# Patient Record
Sex: MALE | Race: WHITE | Employment: OTHER | URBAN - METROPOLITAN AREA
[De-identification: names, ages, dates, MRNs, and addresses within clinical notes are randomized per-mention and may not be internally consistent; named-entity substitution may affect disease eponyms.]

---

## 2017-01-16 PROBLEM — N52.9 ERECTILE DYSFUNCTION: Status: ACTIVE | Noted: 2017-01-16

## 2017-01-16 PROBLEM — J30.9 ALLERGIC RHINITIS: Status: ACTIVE | Noted: 2017-01-16

## 2017-01-16 PROBLEM — R60.9 EDEMA: Status: ACTIVE | Noted: 2017-01-16

## 2017-01-16 PROBLEM — I25.10 CORONARY ATHEROSCLEROSIS OF NATIVE CORONARY VESSEL: Status: ACTIVE | Noted: 2017-01-16

## 2022-03-19 PROBLEM — J30.9 ALLERGIC RHINITIS: Status: ACTIVE | Noted: 2017-01-16

## 2022-03-20 PROBLEM — I25.10 CORONARY ATHEROSCLEROSIS OF NATIVE CORONARY VESSEL: Status: ACTIVE | Noted: 2017-01-16

## 2022-03-28 PROBLEM — I45.10 RBBB: Status: ACTIVE | Noted: 2022-03-28

## 2022-05-01 ENCOUNTER — HOSPITAL ENCOUNTER (INPATIENT)
Age: 67
LOS: 2 days | Discharge: HOME | DRG: 243 | End: 2022-05-03
Attending: EMERGENCY MEDICINE | Admitting: INTERNAL MEDICINE
Payer: COMMERCIAL

## 2022-05-01 ENCOUNTER — APPOINTMENT (OUTPATIENT)
Dept: CT IMAGING | Age: 67
DRG: 243 | End: 2022-05-01
Attending: EMERGENCY MEDICINE
Payer: COMMERCIAL

## 2022-05-01 ENCOUNTER — APPOINTMENT (OUTPATIENT)
Dept: GENERAL RADIOLOGY | Age: 67
DRG: 243 | End: 2022-05-01
Attending: STUDENT IN AN ORGANIZED HEALTH CARE EDUCATION/TRAINING PROGRAM
Payer: COMMERCIAL

## 2022-05-01 DIAGNOSIS — I45.9 STOKES-ADAMS SYNCOPE: ICD-10-CM

## 2022-05-01 DIAGNOSIS — R00.1 BRADYCARDIA: ICD-10-CM

## 2022-05-01 DIAGNOSIS — I45.10 RBBB: Chronic | ICD-10-CM

## 2022-05-01 DIAGNOSIS — E78.5 DYSLIPIDEMIA: Chronic | ICD-10-CM

## 2022-05-01 DIAGNOSIS — G45.3 AMAUROSIS FUGAX OF RIGHT EYE: ICD-10-CM

## 2022-05-01 DIAGNOSIS — I10 PRIMARY HYPERTENSION: Chronic | ICD-10-CM

## 2022-05-01 DIAGNOSIS — I31.39 PERICARDIAL EFFUSION: ICD-10-CM

## 2022-05-01 DIAGNOSIS — I25.10 ATHEROSCLEROSIS OF NATIVE CORONARY ARTERY OF NATIVE HEART WITHOUT ANGINA PECTORIS: Chronic | ICD-10-CM

## 2022-05-01 DIAGNOSIS — I44.2 COMPLETE HEART BLOCK (HCC): ICD-10-CM

## 2022-05-01 DIAGNOSIS — G45.9 TRANSIENT ISCHEMIC ATTACK: Primary | ICD-10-CM

## 2022-05-01 DIAGNOSIS — R55 SYNCOPE AND COLLAPSE: ICD-10-CM

## 2022-05-01 LAB
ALBUMIN SERPL-MCNC: 3.8 G/DL (ref 3.2–4.6)
ALBUMIN/GLOB SERPL: 1 {RATIO} (ref 1.2–3.5)
ALP SERPL-CCNC: 53 U/L (ref 50–136)
ALT SERPL-CCNC: 71 U/L (ref 12–65)
ANION GAP SERPL CALC-SCNC: 8 MMOL/L (ref 7–16)
APPEARANCE UR: CLEAR
AST SERPL-CCNC: 38 U/L (ref 15–37)
BACTERIA URNS QL MICRO: 0 /HPF
BASOPHILS # BLD: 0 K/UL (ref 0–0.2)
BASOPHILS NFR BLD: 0 % (ref 0–2)
BILIRUB SERPL-MCNC: 1.1 MG/DL (ref 0.2–1.1)
BILIRUB UR QL: NEGATIVE
BUN SERPL-MCNC: 13 MG/DL (ref 8–23)
CALCIUM SERPL-MCNC: 9.2 MG/DL (ref 8.3–10.4)
CHLORIDE SERPL-SCNC: 99 MMOL/L (ref 98–107)
CO2 SERPL-SCNC: 28 MMOL/L (ref 21–32)
COLOR UR: YELLOW
CREAT SERPL-MCNC: 1.2 MG/DL (ref 0.8–1.5)
DIFFERENTIAL METHOD BLD: ABNORMAL
EOSINOPHIL # BLD: 0.2 K/UL (ref 0–0.8)
EOSINOPHIL NFR BLD: 1 % (ref 0.5–7.8)
ERYTHROCYTE [DISTWIDTH] IN BLOOD BY AUTOMATED COUNT: 12.6 % (ref 11.9–14.6)
GLOBULIN SER CALC-MCNC: 4 G/DL (ref 2.3–3.5)
GLUCOSE SERPL-MCNC: 116 MG/DL (ref 65–100)
GLUCOSE UR STRIP.AUTO-MCNC: NEGATIVE MG/DL
HCT VFR BLD AUTO: 38.9 % (ref 41.1–50.3)
HGB BLD-MCNC: 14.7 G/DL (ref 13.6–17.2)
HGB UR QL STRIP: NEGATIVE
IMM GRANULOCYTES # BLD AUTO: 0 K/UL (ref 0–0.5)
IMM GRANULOCYTES NFR BLD AUTO: 0 % (ref 0–5)
KETONES UR QL STRIP.AUTO: NEGATIVE MG/DL
LEUKOCYTE ESTERASE UR QL STRIP.AUTO: NEGATIVE
LYMPHOCYTES # BLD: 1.3 K/UL (ref 0.5–4.6)
LYMPHOCYTES NFR BLD: 11 % (ref 13–44)
MCH RBC QN AUTO: 34.7 PG (ref 26.1–32.9)
MCHC RBC AUTO-ENTMCNC: 37.8 G/DL (ref 31.4–35)
MCV RBC AUTO: 91.7 FL (ref 79.6–97.8)
MONOCYTES # BLD: 0.5 K/UL (ref 0.1–1.3)
MONOCYTES NFR BLD: 4 % (ref 4–12)
NEUTS SEG # BLD: 9.8 K/UL (ref 1.7–8.2)
NEUTS SEG NFR BLD: 83 % (ref 43–78)
NITRITE UR QL STRIP.AUTO: NEGATIVE
NRBC # BLD: 0 K/UL (ref 0–0.2)
PH UR STRIP: 7 [PH] (ref 5–9)
PLATELET # BLD AUTO: 177 K/UL (ref 150–450)
PMV BLD AUTO: 9.5 FL (ref 9.4–12.3)
POTASSIUM SERPL-SCNC: 3.7 MMOL/L (ref 3.5–5.1)
PROCALCITONIN SERPL-MCNC: <0.05 NG/ML (ref 0–0.49)
PROT SERPL-MCNC: 7.8 G/DL (ref 6.3–8.2)
PROT UR STRIP-MCNC: NEGATIVE MG/DL
RBC # BLD AUTO: 4.24 M/UL (ref 4.23–5.6)
SODIUM SERPL-SCNC: 135 MMOL/L (ref 136–145)
SP GR UR REFRACTOMETRY: 1.01 (ref 1–1.02)
UROBILINOGEN UR QL STRIP.AUTO: 0.2 EU/DL (ref 0.2–1)
WBC # BLD AUTO: 11.8 K/UL (ref 4.3–11.1)

## 2022-05-01 PROCEDURE — 5A1223Z PERFORMANCE OF CARDIAC PACING, CONTINUOUS: ICD-10-PCS | Performed by: INTERNAL MEDICINE

## 2022-05-01 PROCEDURE — 70496 CT ANGIOGRAPHY HEAD: CPT

## 2022-05-01 PROCEDURE — 74011250636 HC RX REV CODE- 250/636: Performed by: NURSE PRACTITIONER

## 2022-05-01 PROCEDURE — 83735 ASSAY OF MAGNESIUM: CPT

## 2022-05-01 PROCEDURE — 77030022513 HC GD NDL ACUSIT CIVC -B: Performed by: INTERNAL MEDICINE

## 2022-05-01 PROCEDURE — 80307 DRUG TEST PRSMV CHEM ANLYZR: CPT

## 2022-05-01 PROCEDURE — 33210 INSERT ELECTRD/PM CATH SNGL: CPT | Performed by: INTERNAL MEDICINE

## 2022-05-01 PROCEDURE — 77030041244 HC CBL PACE EXT TEMP REMG -B: Performed by: INTERNAL MEDICINE

## 2022-05-01 PROCEDURE — 99152 MOD SED SAME PHYS/QHP 5/>YRS: CPT | Performed by: INTERNAL MEDICINE

## 2022-05-01 PROCEDURE — 74011000258 HC RX REV CODE- 258: Performed by: EMERGENCY MEDICINE

## 2022-05-01 PROCEDURE — 36415 COLL VENOUS BLD VENIPUNCTURE: CPT

## 2022-05-01 PROCEDURE — 99285 EMERGENCY DEPT VISIT HI MDM: CPT

## 2022-05-01 PROCEDURE — 65610000001 HC ROOM ICU GENERAL

## 2022-05-01 PROCEDURE — 93005 ELECTROCARDIOGRAM TRACING: CPT | Performed by: EMERGENCY MEDICINE

## 2022-05-01 PROCEDURE — 84484 ASSAY OF TROPONIN QUANT: CPT

## 2022-05-01 PROCEDURE — 74011250636 HC RX REV CODE- 250/636

## 2022-05-01 PROCEDURE — 84443 ASSAY THYROID STIM HORMONE: CPT

## 2022-05-01 PROCEDURE — L1830 KO IMMOB CANVAS LONG PRE OTS: HCPCS | Performed by: INTERNAL MEDICINE

## 2022-05-01 PROCEDURE — 71045 X-RAY EXAM CHEST 1 VIEW: CPT

## 2022-05-01 PROCEDURE — 99223 1ST HOSP IP/OBS HIGH 75: CPT | Performed by: INTERNAL MEDICINE

## 2022-05-01 PROCEDURE — 93005 ELECTROCARDIOGRAM TRACING: CPT | Performed by: INTERNAL MEDICINE

## 2022-05-01 PROCEDURE — 81003 URINALYSIS AUTO W/O SCOPE: CPT

## 2022-05-01 PROCEDURE — 77030014166 HC ELECTRD ECG PACE BARD -C: Performed by: INTERNAL MEDICINE

## 2022-05-01 PROCEDURE — 74011000636 HC RX REV CODE- 636: Performed by: EMERGENCY MEDICINE

## 2022-05-01 PROCEDURE — 74011000250 HC RX REV CODE- 250: Performed by: INTERNAL MEDICINE

## 2022-05-01 PROCEDURE — 84145 PROCALCITONIN (PCT): CPT

## 2022-05-01 PROCEDURE — 74011250636 HC RX REV CODE- 250/636: Performed by: INTERNAL MEDICINE

## 2022-05-01 PROCEDURE — 74011000250 HC RX REV CODE- 250: Performed by: EMERGENCY MEDICINE

## 2022-05-01 PROCEDURE — C1894 INTRO/SHEATH, NON-LASER: HCPCS | Performed by: INTERNAL MEDICINE

## 2022-05-01 PROCEDURE — 85025 COMPLETE CBC W/AUTO DIFF WBC: CPT

## 2022-05-01 PROCEDURE — 80053 COMPREHEN METABOLIC PANEL: CPT

## 2022-05-01 PROCEDURE — 77030002888 HC SUT CHRMC J&J -A: Performed by: INTERNAL MEDICINE

## 2022-05-01 PROCEDURE — 70450 CT HEAD/BRAIN W/O DYE: CPT

## 2022-05-01 PROCEDURE — 2709999900 HC NON-CHARGEABLE SUPPLY

## 2022-05-01 RX ORDER — ALLOPURINOL 300 MG/1
300 TABLET ORAL DAILY
Status: DISCONTINUED | OUTPATIENT
Start: 2022-05-02 | End: 2022-05-03 | Stop reason: HOSPADM

## 2022-05-01 RX ORDER — SODIUM CHLORIDE 0.9 % (FLUSH) 0.9 %
5-40 SYRINGE (ML) INJECTION EVERY 8 HOURS
Status: DISCONTINUED | OUTPATIENT
Start: 2022-05-01 | End: 2022-05-02

## 2022-05-01 RX ORDER — ACETAMINOPHEN 650 MG/1
650 SUPPOSITORY RECTAL
Status: DISCONTINUED | OUTPATIENT
Start: 2022-05-01 | End: 2022-05-03 | Stop reason: HOSPADM

## 2022-05-01 RX ORDER — SODIUM CHLORIDE 0.9 % (FLUSH) 0.9 %
5-40 SYRINGE (ML) INJECTION AS NEEDED
Status: DISCONTINUED | OUTPATIENT
Start: 2022-05-01 | End: 2022-05-02

## 2022-05-01 RX ORDER — ATROPINE SULFATE 0.1 MG/ML
INJECTION INTRAVENOUS
Status: COMPLETED
Start: 2022-05-01 | End: 2022-05-01

## 2022-05-01 RX ORDER — ALLOPURINOL 100 MG/1
100 TABLET ORAL DAILY
Status: DISCONTINUED | OUTPATIENT
Start: 2022-05-02 | End: 2022-05-01

## 2022-05-01 RX ORDER — FENTANYL CITRATE 50 UG/ML
INJECTION, SOLUTION INTRAMUSCULAR; INTRAVENOUS AS NEEDED
Status: DISCONTINUED | OUTPATIENT
Start: 2022-05-01 | End: 2022-05-01 | Stop reason: HOSPADM

## 2022-05-01 RX ORDER — ONDANSETRON 8 MG/1
4 TABLET, ORALLY DISINTEGRATING ORAL
Status: DISCONTINUED | OUTPATIENT
Start: 2022-05-01 | End: 2022-05-03 | Stop reason: HOSPADM

## 2022-05-01 RX ORDER — DOPAMINE HYDROCHLORIDE 320 MG/100ML
0-20 INJECTION, SOLUTION INTRAVENOUS
Status: DISCONTINUED | OUTPATIENT
Start: 2022-05-01 | End: 2022-05-03

## 2022-05-01 RX ORDER — SODIUM CHLORIDE 0.9 % (FLUSH) 0.9 %
5-40 SYRINGE (ML) INJECTION AS NEEDED
Status: DISCONTINUED | OUTPATIENT
Start: 2022-05-01 | End: 2022-05-03 | Stop reason: HOSPADM

## 2022-05-01 RX ORDER — ENOXAPARIN SODIUM 100 MG/ML
40 INJECTION SUBCUTANEOUS DAILY
Status: DISCONTINUED | OUTPATIENT
Start: 2022-05-02 | End: 2022-05-03 | Stop reason: HOSPADM

## 2022-05-01 RX ORDER — POLYETHYLENE GLYCOL 3350 17 G/17G
17 POWDER, FOR SOLUTION ORAL DAILY PRN
Status: DISCONTINUED | OUTPATIENT
Start: 2022-05-01 | End: 2022-05-03 | Stop reason: HOSPADM

## 2022-05-01 RX ORDER — ONDANSETRON 2 MG/ML
4 INJECTION INTRAMUSCULAR; INTRAVENOUS
Status: DISCONTINUED | OUTPATIENT
Start: 2022-05-01 | End: 2022-05-03 | Stop reason: HOSPADM

## 2022-05-01 RX ORDER — PANTOPRAZOLE SODIUM 40 MG/1
40 TABLET, DELAYED RELEASE ORAL DAILY
COMMUNITY

## 2022-05-01 RX ORDER — SODIUM CHLORIDE 0.9 % (FLUSH) 0.9 %
5-10 SYRINGE (ML) INJECTION AS NEEDED
Status: DISCONTINUED | OUTPATIENT
Start: 2022-05-01 | End: 2022-05-03 | Stop reason: HOSPADM

## 2022-05-01 RX ORDER — ASPIRIN 81 MG/1
81 TABLET ORAL DAILY
Status: DISCONTINUED | OUTPATIENT
Start: 2022-05-02 | End: 2022-05-03 | Stop reason: HOSPADM

## 2022-05-01 RX ORDER — MIDAZOLAM HYDROCHLORIDE 1 MG/ML
INJECTION, SOLUTION INTRAMUSCULAR; INTRAVENOUS AS NEEDED
Status: DISCONTINUED | OUTPATIENT
Start: 2022-05-01 | End: 2022-05-01 | Stop reason: HOSPADM

## 2022-05-01 RX ORDER — LEVOTHYROXINE SODIUM 100 UG/1
100 TABLET ORAL
Status: DISCONTINUED | OUTPATIENT
Start: 2022-05-02 | End: 2022-05-03 | Stop reason: HOSPADM

## 2022-05-01 RX ORDER — SODIUM CHLORIDE 0.9 % (FLUSH) 0.9 %
5-10 SYRINGE (ML) INJECTION EVERY 8 HOURS
Status: DISCONTINUED | OUTPATIENT
Start: 2022-05-01 | End: 2022-05-02

## 2022-05-01 RX ORDER — SODIUM CHLORIDE 0.9 % (FLUSH) 0.9 %
5-40 SYRINGE (ML) INJECTION EVERY 8 HOURS
Status: DISCONTINUED | OUTPATIENT
Start: 2022-05-01 | End: 2022-05-03 | Stop reason: HOSPADM

## 2022-05-01 RX ORDER — ACETAMINOPHEN 325 MG/1
650 TABLET ORAL
Status: DISCONTINUED | OUTPATIENT
Start: 2022-05-01 | End: 2022-05-03 | Stop reason: HOSPADM

## 2022-05-01 RX ORDER — ALLOPURINOL 300 MG/1
300 TABLET ORAL DAILY
COMMUNITY

## 2022-05-01 RX ORDER — PANTOPRAZOLE SODIUM 40 MG/1
40 TABLET, DELAYED RELEASE ORAL
Status: DISCONTINUED | OUTPATIENT
Start: 2022-05-02 | End: 2022-05-03 | Stop reason: HOSPADM

## 2022-05-01 RX ORDER — SODIUM CHLORIDE, SODIUM LACTATE, POTASSIUM CHLORIDE, CALCIUM CHLORIDE 600; 310; 30; 20 MG/100ML; MG/100ML; MG/100ML; MG/100ML
75 INJECTION, SOLUTION INTRAVENOUS CONTINUOUS
Status: DISCONTINUED | OUTPATIENT
Start: 2022-05-01 | End: 2022-05-03

## 2022-05-01 RX ORDER — ATROPINE SULFATE 0.1 MG/ML
0.5 INJECTION INTRAVENOUS ONCE
Status: ACTIVE | OUTPATIENT
Start: 2022-05-01 | End: 2022-05-02

## 2022-05-01 RX ADMIN — ATROPINE SULFATE 0.5 MG: 0.1 INJECTION INTRAVENOUS at 21:02

## 2022-05-01 RX ADMIN — SODIUM CHLORIDE, PRESERVATIVE FREE 10 ML: 5 INJECTION INTRAVENOUS at 23:33

## 2022-05-01 RX ADMIN — SODIUM CHLORIDE, PRESERVATIVE FREE 10 ML: 5 INJECTION INTRAVENOUS at 23:34

## 2022-05-01 RX ADMIN — IOPAMIDOL 100 ML: 755 INJECTION, SOLUTION INTRAVENOUS at 19:07

## 2022-05-01 RX ADMIN — SODIUM CHLORIDE, PRESERVATIVE FREE 10 ML: 5 INJECTION INTRAVENOUS at 19:08

## 2022-05-01 RX ADMIN — DOPAMINE HYDROCHLORIDE 5 MCG/KG/MIN: 320 INJECTION, SOLUTION INTRAVENOUS at 22:10

## 2022-05-01 RX ADMIN — SODIUM CHLORIDE 100 ML: 9 INJECTION, SOLUTION INTRAVENOUS at 19:08

## 2022-05-01 NOTE — Clinical Note
Pocket closed with 2-0 vicryl and 3-0 vloc sutures, incision closed with staples and covered with primacel dressing.

## 2022-05-01 NOTE — Clinical Note
TRANSFER - OUT REPORT:     Verbal report given to: ICU RN, (at bedside). Report consisted of patient's Situation, Background, Assessment and   Recommendations(SBAR). Opportunity for questions and clarification was provided. Patient transported with a Registered Nurse.

## 2022-05-01 NOTE — Clinical Note
Temporary pacemaker inserted. Inserted through the right groin. Temporary Pacer pacing in the right ventricle. Device secured using: tegaderm and suture. Rate = 60 bpm.   10 mA. Electrical capture obtained.

## 2022-05-01 NOTE — Clinical Note
TRANSFER - OUT REPORT:     Verbal report given to: icu rn. Report consisted of patient's Situation, Background, Assessment and   Recommendations(SBAR). Opportunity for questions and clarification was provided. Patient transported to: icu, [id number].

## 2022-05-01 NOTE — Clinical Note
TRANSFER - IN REPORT:     Verbal report received from: er rn. Report consisted of patient's Situation, Background, Assessment and   Recommendations(SBAR). Opportunity for questions and clarification was provided. Assessment completed upon patient's arrival to unit and care assumed.

## 2022-05-01 NOTE — ED PROVIDER NOTES
[facility] Emergency Department Provider Note                     PCP:                Rusty Brownlee NP               Age: 61 y.o. Sex: M         No diagnosis found. MDM  Number of Diagnoses or Management Options  Syncope and collapse  Transient ischemic attack  Diagnosis management comments: Syncope and TIA work-up initiated. Onset was around 2 or 2:30 PM per patient but he is uncertain. Symptoms are completely resolved at this time and patient is not a tPA candidate at this time. NIH score is 0.    9:08 PM  Was awaiting admission to the hospitalist service for syncope and transient right eye blindness. His symptoms recurred and he lost consciousness and had third-degree heart block on the monitor which persists. Blood pressure stable. 0.5 mg atropine provided. Patient awake and alert and tolerating this well at this point. Cardiology consulted for pacemaker placement.        Amount and/or Complexity of Data Reviewed  Clinical lab tests: ordered and reviewed  Tests in the radiology section of CPT®: ordered and reviewed  Tests in the medicine section of CPT®: ordered and reviewed  Discuss the patient with other providers: yes  Independent visualization of images, tracings, or specimens: yes (9:07 PM  EKG shows third-degree heart block and right bundle branch block rate of 32    Reported by ED physician Dr. Josseline Martinez)    Risk of Complications, Morbidity, and/or Mortality  Presenting problems: high  Diagnostic procedures: low  Management options: moderate        Orders Placed This Encounter    XR Chest Port    CT HEAD WO CONT    CBC    CMP    Cardiac Monitoring    PULSE OXIMETRY CONTINUOUS    Orthostatic Vital Signs    EKG    SALINE LOCK IV ONE TIME Routine    sodium chloride (NS) flush 5-10 mL    sodium chloride (NS) flush 5-10 mL        Patricia Garay is a 61 y.o. male who presents to the Emergency Department with chief complaint of    Chief Complaint   Patient presents with    Syncope      HPI 79-year-old male reports waking up this morning and not feeling well with fatigue. For couple of weeks he has had a postnasal drip and dry cough that he related to allergies. He has been feeling intermittently lightheaded as well and today he felt lightheaded and when he stood up this got worse and he bent over to pick something up and then woke up on the floor sometime later. He had 2 other episodes where he almost passed out when he stood up. He reports transient loss of right eye vision on 3 occasions related to the spells. Symptoms currently resolved. He denies any headache or numbness tingling or weakness on the left or right. Patient has a history of hypertension, hypercholesterolemia and acid reflux. He does not smoke and never has. He takes a low-dose 81 mg aspirin daily and took a complete aspirin in the midst of these symptoms today. Review of Systems   Constitutional: Negative for chills and fever. HENT: Positive for congestion and rhinorrhea. Negative for sore throat. Eyes: Negative for photophobia and redness. Respiratory: Positive for cough. Negative for shortness of breath. Cardiovascular: Negative for chest pain, palpitations and leg swelling. Gastrointestinal: Negative for abdominal pain, diarrhea, nausea and vomiting. Endocrine: Negative for polydipsia and polyuria. Genitourinary: Negative for dysuria and hematuria. Musculoskeletal: Negative for back pain and myalgias. Neurological: Positive for syncope and light-headedness. Negative for weakness, numbness and headaches. Psychiatric/Behavioral: Negative for confusion. All other systems reviewed and are negative. All other systems reviewed and are negative.       Past Medical History:   Diagnosis Date    Chest pain 3/29/2013    Edema 1/16/2017    Erectile dysfunction 1/16/2017    GERD (gastroesophageal reflux disease) 3/29/2013    Hypothyroidism 3/29/2013        Past Surgical History:   Procedure Laterality Date    HX HEART CATHETERIZATION  03/29/2013       No family history on file. Social Connections:     Frequency of Communication with Friends and Family: Not on file    Frequency of Social Gatherings with Friends and Family: Not on file    Attends Synagogue Services: Not on file    Active Member of Clubs or Organizations: Not on file    Attends Club or Organization Meetings: Not on file    Marital Status: Not on file        Allergies   Allergen Reactions    Norvasc [Amlodipine] Swelling        Vitals signs and nursing note reviewed. Patient Vitals for the past 4 hrs:   Temp Pulse Resp BP SpO2   05/01/22 1736 97.7 °F (36.5 °C) 82 18 (!) 145/81 97 %          Physical Exam  Vitals and nursing note reviewed. Constitutional:       General: He is not in acute distress. Appearance: He is well-developed. He is not ill-appearing. HENT:      Head: Normocephalic and atraumatic. Nose: Nose normal.      Mouth/Throat:      Pharynx: No oropharyngeal exudate. Eyes:      Extraocular Movements: Extraocular movements intact. Conjunctiva/sclera: Conjunctivae normal.      Pupils: Pupils are equal, round, and reactive to light. Comments: Lerma intact   Cardiovascular:      Rate and Rhythm: Normal rate and regular rhythm. Heart sounds: Normal heart sounds. Pulmonary:      Effort: Pulmonary effort is normal.      Breath sounds: Normal breath sounds. Abdominal:      General: Bowel sounds are normal. There is no distension. Palpations: Abdomen is soft. Tenderness: There is no abdominal tenderness. There is no guarding or rebound. Musculoskeletal:         General: No tenderness. Normal range of motion. Cervical back: Neck supple. Right lower leg: No edema. Left lower leg: No edema. Lymphadenopathy:      Cervical: No cervical adenopathy. Skin:     General: Skin is warm and dry.    Neurological:      Mental Status: He is alert and oriented to person, place, and time. Cranial Nerves: No cranial nerve deficit. Sensory: No sensory deficit. Motor: No weakness. Coordination: Coordination normal.              Critical Care  Performed by: Betito Van MD  Authorized by: Betito Van MD     Critical care provider statement:     Critical care time (minutes):  31    Critical care time was exclusive of:  Separately billable procedures and treating other patients    Critical care was necessary to treat or prevent imminent or life-threatening deterioration of the following conditions:  Cardiac failure    Critical care was time spent personally by me on the following activities:  Development of treatment plan with patient or surrogate, discussions with consultants, evaluation of patient's response to treatment, examination of patient, obtaining history from patient or surrogate, ordering and performing treatments and interventions, ordering and review of laboratory studies, ordering and review of radiographic studies, pulse oximetry, re-evaluation of patient's condition and review of old charts    I assumed direction of critical care for this patient from another provider in my specialty: no          Results for orders placed or performed during the hospital encounter of 05/01/22   XR CHEST PORT    Narrative    EXAM: XR CHEST PORT  INDICATION: Syncope  COMPARISON: Chest radiograph, 3/29/2013    FINDINGS:  The cardiomediastinal silhouette is within normal limits. Atherosclerotic  calcifications in the aorta. No focal parenchymal process. No pleural effusion. No pneumothorax. No acute osseous abnormality. Impression    Atherosclerosis without acute cardiopulmonary abnormality. XR CHEST PORT   Final Result   Atherosclerosis without acute cardiopulmonary abnormality. CT HEAD WO CONT    (Results Pending)         Voice dictation software was used during the making of this note.   This software is not perfect and grammatical and other typographical errors may be present. This note has not been completely proofread for errors.

## 2022-05-01 NOTE — ED TRIAGE NOTES
Pt arrives via ems from home for syncopal episode after standing from sitting. Pt reports down for 10-15 mins. Pt reports when he got up and reports started feeling dizzy and lightheaded.  EMS VS: bp 165/86 hr 66 regular 97% RA

## 2022-05-01 NOTE — Clinical Note
Status[de-identified] INPATIENT [101]   Type of Bed: Medical [8]   Inpatient Hospitalization Certified Necessary for the Following Reasons: 3.  Patient receiving treatment that can only be provided in an inpatient setting (further clarification in H&P documentation)   Admitting Diagnosis: Syncope [206001]   Admitting Physician: Negrita Garcia, [address]   Attending Physician: Mayuri Tee   Estimated Length of Stay: 2 Midnights   Discharge Plan[de-identified] Home with Office Follow-up

## 2022-05-02 ENCOUNTER — ANESTHESIA (OUTPATIENT)
Dept: CARDIAC CATH/INVASIVE PROCEDURES | Age: 67
DRG: 243 | End: 2022-05-02
Payer: COMMERCIAL

## 2022-05-02 ENCOUNTER — APPOINTMENT (OUTPATIENT)
Dept: NON INVASIVE DIAGNOSTICS | Age: 67
DRG: 243 | End: 2022-05-02
Attending: NURSE PRACTITIONER
Payer: COMMERCIAL

## 2022-05-02 ENCOUNTER — ANESTHESIA EVENT (OUTPATIENT)
Dept: CARDIAC CATH/INVASIVE PROCEDURES | Age: 67
DRG: 243 | End: 2022-05-02
Payer: COMMERCIAL

## 2022-05-02 ENCOUNTER — APPOINTMENT (OUTPATIENT)
Dept: NON INVASIVE DIAGNOSTICS | Age: 67
DRG: 243 | End: 2022-05-02
Attending: INTERNAL MEDICINE
Payer: COMMERCIAL

## 2022-05-02 ENCOUNTER — APPOINTMENT (OUTPATIENT)
Dept: GENERAL RADIOLOGY | Age: 67
DRG: 243 | End: 2022-05-02
Attending: INTERNAL MEDICINE
Payer: COMMERCIAL

## 2022-05-02 LAB
AMMONIA PLAS-SCNC: 30 UMOL/L (ref 11–32)
AMPHET UR QL SCN: NEGATIVE
ANION GAP SERPL CALC-SCNC: 7 MMOL/L (ref 7–16)
ATRIAL RATE: 64 BPM
ATRIAL RATE: 80 BPM
ATRIAL RATE: 82 BPM
BARBITURATES UR QL SCN: NEGATIVE
BASOPHILS # BLD: 0 K/UL (ref 0–0.2)
BASOPHILS NFR BLD: 0 % (ref 0–2)
BENZODIAZ UR QL: NEGATIVE
BUN SERPL-MCNC: 14 MG/DL (ref 8–23)
CALCIUM SERPL-MCNC: 9.1 MG/DL (ref 8.3–10.4)
CALCULATED P AXIS, ECG09: 0 DEGREES
CALCULATED P AXIS, ECG09: 45 DEGREES
CALCULATED P AXIS, ECG09: 56 DEGREES
CALCULATED R AXIS, ECG10: 28 DEGREES
CALCULATED R AXIS, ECG10: 61 DEGREES
CALCULATED R AXIS, ECG10: 67 DEGREES
CALCULATED T AXIS, ECG11: -7 DEGREES
CALCULATED T AXIS, ECG11: 39 DEGREES
CALCULATED T AXIS, ECG11: 74 DEGREES
CANNABINOIDS UR QL SCN: NEGATIVE
CHLORIDE SERPL-SCNC: 99 MMOL/L (ref 98–107)
CHOLEST SERPL-MCNC: 129 MG/DL
CK SERPL-CCNC: 265 U/L (ref 21–215)
CO2 SERPL-SCNC: 29 MMOL/L (ref 21–32)
COCAINE UR QL SCN: NEGATIVE
CREAT SERPL-MCNC: 1.1 MG/DL (ref 0.8–1.5)
DIAGNOSIS, 93000: NORMAL
DIFFERENTIAL METHOD BLD: ABNORMAL
ECHO AO ROOT DIAM: 3.2 CM
ECHO AO ROOT INDEX: 1.53 CM/M2
ECHO EST RA PRESSURE: 3 MMHG
ECHO IVC PROX: 1.5 CM
ECHO LA DIAMETER INDEX: 1.87 CM/M2
ECHO LA DIAMETER: 3.9 CM
ECHO LA TO AORTIC ROOT RATIO: 1.22
ECHO LV E' LATERAL VELOCITY: 13 CM/S
ECHO LV E' SEPTAL VELOCITY: 8 CM/S
ECHO LV EDV A2C: 113 ML
ECHO LV EDV A4C: 138 ML
ECHO LV EDV INDEX A4C: 66 ML/M2
ECHO LV EDV NDEX A2C: 54 ML/M2
ECHO LV EJECTION FRACTION A2C: 68 %
ECHO LV EJECTION FRACTION A4C: 66 %
ECHO LV EJECTION FRACTION BIPLANE: 68 % (ref 55–100)
ECHO LV ESV A2C: 36 ML
ECHO LV ESV A4C: 47 ML
ECHO LV ESV INDEX A2C: 17 ML/M2
ECHO LV ESV INDEX A4C: 22 ML/M2
ECHO LV FRACTIONAL SHORTENING: 23 % (ref 28–44)
ECHO LV FRACTIONAL SHORTENING: 37 % (ref 28–44)
ECHO LV INTERNAL DIMENSION DIASTOLE INDEX: 2.06 CM/M2
ECHO LV INTERNAL DIMENSION DIASTOLE INDEX: 2.06 CM/M2
ECHO LV INTERNAL DIMENSION DIASTOLIC: 4.3 CM (ref 4.2–5.9)
ECHO LV INTERNAL DIMENSION DIASTOLIC: 4.3 CM (ref 4.2–5.9)
ECHO LV INTERNAL DIMENSION SYSTOLIC INDEX: 1.29 CM/M2
ECHO LV INTERNAL DIMENSION SYSTOLIC INDEX: 1.58 CM/M2
ECHO LV INTERNAL DIMENSION SYSTOLIC: 2.7 CM
ECHO LV INTERNAL DIMENSION SYSTOLIC: 3.3 CM
ECHO LV IVSD: 0.9 CM (ref 0.6–1)
ECHO LV IVSD: 1 CM (ref 0.6–1)
ECHO LV MASS 2D: 123.3 G (ref 88–224)
ECHO LV MASS 2D: 142.5 G (ref 88–224)
ECHO LV MASS INDEX 2D: 59 G/M2 (ref 49–115)
ECHO LV MASS INDEX 2D: 68.2 G/M2 (ref 49–115)
ECHO LV POSTERIOR WALL DIASTOLIC: 0.9 CM (ref 0.6–1)
ECHO LV POSTERIOR WALL DIASTOLIC: 1 CM (ref 0.6–1)
ECHO LV RELATIVE WALL THICKNESS RATIO: 0.42
ECHO LV RELATIVE WALL THICKNESS RATIO: 0.47
ECHO LVOT AREA: 4.2 CM2
ECHO LVOT DIAM: 2.3 CM
ECHO MV A VELOCITY: 0.58 M/S
ECHO MV E DECELERATION TIME (DT): 214 MS
ECHO MV E VELOCITY: 0.77 M/S
ECHO MV E/A RATIO: 1.33
ECHO MV E/E' LATERAL: 5.92
ECHO MV E/E' RATIO (AVERAGED): 7.77
ECHO MV E/E' SEPTAL: 9.63
ECHO RV INTERNAL DIMENSION: 3.6 CM
ECHO RV INTERNAL DIMENSION: 3.9 CM
EOSINOPHIL # BLD: 0.2 K/UL (ref 0–0.8)
EOSINOPHIL NFR BLD: 2 % (ref 0.5–7.8)
ERYTHROCYTE [DISTWIDTH] IN BLOOD BY AUTOMATED COUNT: 12.4 % (ref 11.9–14.6)
ERYTHROCYTE [SEDIMENTATION RATE] IN BLOOD: 11 MM/HR (ref 0–15)
EST. AVERAGE GLUCOSE BLD GHB EST-MCNC: 105 MG/DL
GLUCOSE SERPL-MCNC: 116 MG/DL (ref 65–100)
HBA1C MFR BLD: 5.3 % (ref 4.2–6.3)
HCT VFR BLD AUTO: 36.8 % (ref 41.1–50.3)
HDLC SERPL-MCNC: 42 MG/DL (ref 40–60)
HDLC SERPL: 3.1 {RATIO}
HGB BLD-MCNC: 13.4 G/DL (ref 13.6–17.2)
IMM GRANULOCYTES # BLD AUTO: 0 K/UL (ref 0–0.5)
IMM GRANULOCYTES NFR BLD AUTO: 0 % (ref 0–5)
LDLC SERPL CALC-MCNC: 48.4 MG/DL
LYMPHOCYTES # BLD: 1.9 K/UL (ref 0.5–4.6)
LYMPHOCYTES NFR BLD: 25 % (ref 13–44)
MAGNESIUM SERPL-MCNC: 1.8 MG/DL (ref 1.8–2.4)
MAGNESIUM SERPL-MCNC: 2 MG/DL (ref 1.8–2.4)
MCH RBC QN AUTO: 33.2 PG (ref 26.1–32.9)
MCHC RBC AUTO-ENTMCNC: 36.4 G/DL (ref 31.4–35)
MCV RBC AUTO: 91.1 FL (ref 79.6–97.8)
METHADONE UR QL: NEGATIVE
MONOCYTES # BLD: 0.5 K/UL (ref 0.1–1.3)
MONOCYTES NFR BLD: 7 % (ref 4–12)
NEUTS SEG # BLD: 5 K/UL (ref 1.7–8.2)
NEUTS SEG NFR BLD: 66 % (ref 43–78)
NRBC # BLD: 0 K/UL (ref 0–0.2)
OPIATES UR QL: NEGATIVE
P-R INTERVAL, ECG05: 206 MS
P-R INTERVAL, ECG05: 218 MS
PCP UR QL: NEGATIVE
PLATELET # BLD AUTO: 160 K/UL (ref 150–450)
PMV BLD AUTO: 9.1 FL (ref 9.4–12.3)
POTASSIUM SERPL-SCNC: 3.9 MMOL/L (ref 3.5–5.1)
Q-T INTERVAL, ECG07: 381 MS
Q-T INTERVAL, ECG07: 434 MS
Q-T INTERVAL, ECG07: 514 MS
QRS DURATION, ECG06: 136 MS
QRS DURATION, ECG06: 140 MS
QRS DURATION, ECG06: 150 MS
QTC CALCULATION (BEZET), ECG08: 375 MS
QTC CALCULATION (BEZET), ECG08: 437 MS
QTC CALCULATION (BEZET), ECG08: 447 MS
RBC # BLD AUTO: 4.04 M/UL (ref 4.23–5.6)
SODIUM SERPL-SCNC: 135 MMOL/L (ref 136–145)
T4 FREE SERPL-MCNC: 1.2 NG/DL (ref 0.9–1.8)
TRIGL SERPL-MCNC: 193 MG/DL (ref 35–150)
TROPONIN-HIGH SENSITIVITY: 148.6 PG/ML (ref 0–14)
TROPONIN-HIGH SENSITIVITY: 187.9 PG/ML (ref 0–14)
TROPONIN-HIGH SENSITIVITY: 220.8 PG/ML (ref 0–14)
TSH SERPL DL<=0.005 MIU/L-ACNC: 0.96 UIU/ML (ref 0.36–3.74)
VENTRICULAR RATE, ECG03: 32 BPM
VENTRICULAR RATE, ECG03: 64 BPM
VENTRICULAR RATE, ECG03: 79 BPM
VLDLC SERPL CALC-MCNC: 38.6 MG/DL (ref 6–23)
WBC # BLD AUTO: 7.6 K/UL (ref 4.3–11.1)

## 2022-05-02 PROCEDURE — 74011000272 HC RX REV CODE- 272: Performed by: INTERNAL MEDICINE

## 2022-05-02 PROCEDURE — 74011250636 HC RX REV CODE- 250/636: Performed by: INTERNAL MEDICINE

## 2022-05-02 PROCEDURE — 74011000250 HC RX REV CODE- 250: Performed by: HOSPITALIST

## 2022-05-02 PROCEDURE — 85025 COMPLETE CBC W/AUTO DIFF WBC: CPT

## 2022-05-02 PROCEDURE — 74011250636 HC RX REV CODE- 250/636: Performed by: HOSPITALIST

## 2022-05-02 PROCEDURE — C1785 PMKR, DUAL, RATE-RESP: HCPCS | Performed by: INTERNAL MEDICINE

## 2022-05-02 PROCEDURE — C8923 2D TTE W OR W/O FOL W/CON,CO: HCPCS

## 2022-05-02 PROCEDURE — 99221 1ST HOSP IP/OBS SF/LOW 40: CPT | Performed by: PSYCHIATRY & NEUROLOGY

## 2022-05-02 PROCEDURE — 33208 INSRT HEART PM ATRIAL & VENT: CPT | Performed by: INTERNAL MEDICINE

## 2022-05-02 PROCEDURE — 77030022704 HC SUT VLOC COVD -B: Performed by: INTERNAL MEDICINE

## 2022-05-02 PROCEDURE — 80048 BASIC METABOLIC PNL TOTAL CA: CPT

## 2022-05-02 PROCEDURE — 76060000033 HC ANESTHESIA 1 TO 1.5 HR: Performed by: INTERNAL MEDICINE

## 2022-05-02 PROCEDURE — 71045 X-RAY EXAM CHEST 1 VIEW: CPT

## 2022-05-02 PROCEDURE — 2709999900 HC NON-CHARGEABLE SUPPLY: Performed by: INTERNAL MEDICINE

## 2022-05-02 PROCEDURE — 74011000250 HC RX REV CODE- 250: Performed by: INTERNAL MEDICINE

## 2022-05-02 PROCEDURE — APPNB30 APP NON BILLABLE TIME 0-30 MINS: Performed by: NURSE PRACTITIONER

## 2022-05-02 PROCEDURE — 77030013504 HC DEV ELECSURG MEDT -F: Performed by: INTERNAL MEDICINE

## 2022-05-02 PROCEDURE — 93005 ELECTROCARDIOGRAM TRACING: CPT | Performed by: INTERNAL MEDICINE

## 2022-05-02 PROCEDURE — 02H63JZ INSERTION OF PACEMAKER LEAD INTO RIGHT ATRIUM, PERCUTANEOUS APPROACH: ICD-10-PCS | Performed by: INTERNAL MEDICINE

## 2022-05-02 PROCEDURE — 84484 ASSAY OF TROPONIN QUANT: CPT

## 2022-05-02 PROCEDURE — 74011250637 HC RX REV CODE- 250/637: Performed by: INTERNAL MEDICINE

## 2022-05-02 PROCEDURE — 82550 ASSAY OF CK (CPK): CPT

## 2022-05-02 PROCEDURE — 99231 SBSQ HOSP IP/OBS SF/LOW 25: CPT | Performed by: PHYSICAL MEDICINE & REHABILITATION

## 2022-05-02 PROCEDURE — 82140 ASSAY OF AMMONIA: CPT

## 2022-05-02 PROCEDURE — 85652 RBC SED RATE AUTOMATED: CPT

## 2022-05-02 PROCEDURE — 93308 TTE F-UP OR LMTD: CPT

## 2022-05-02 PROCEDURE — 77030041279 HC DRSG PRMSL AG MDII -B: Performed by: INTERNAL MEDICINE

## 2022-05-02 PROCEDURE — 0JH606Z INSERTION OF PACEMAKER, DUAL CHAMBER INTO CHEST SUBCUTANEOUS TISSUE AND FASCIA, OPEN APPROACH: ICD-10-PCS | Performed by: INTERNAL MEDICINE

## 2022-05-02 PROCEDURE — 77030031139 HC SUT VCRL2 J&J -A: Performed by: INTERNAL MEDICINE

## 2022-05-02 PROCEDURE — C1892 INTRO/SHEATH,FIXED,PEEL-AWAY: HCPCS | Performed by: INTERNAL MEDICINE

## 2022-05-02 PROCEDURE — 84439 ASSAY OF FREE THYROXINE: CPT

## 2022-05-02 PROCEDURE — 77030013687 HC GD NDL BARD -B: Performed by: INTERNAL MEDICINE

## 2022-05-02 PROCEDURE — 36415 COLL VENOUS BLD VENIPUNCTURE: CPT

## 2022-05-02 PROCEDURE — C1898 LEAD, PMKR, OTHER THAN TRANS: HCPCS | Performed by: INTERNAL MEDICINE

## 2022-05-02 PROCEDURE — 65270000046 HC RM TELEMETRY

## 2022-05-02 PROCEDURE — 74011000250 HC RX REV CODE- 250: Performed by: EMERGENCY MEDICINE

## 2022-05-02 PROCEDURE — 77030002912 HC SUT ETHBND J&J -A: Performed by: INTERNAL MEDICINE

## 2022-05-02 PROCEDURE — 02HK3JZ INSERTION OF PACEMAKER LEAD INTO RIGHT VENTRICLE, PERCUTANEOUS APPROACH: ICD-10-PCS | Performed by: INTERNAL MEDICINE

## 2022-05-02 PROCEDURE — 83735 ASSAY OF MAGNESIUM: CPT

## 2022-05-02 PROCEDURE — 2709999900 HC NON-CHARGEABLE SUPPLY

## 2022-05-02 PROCEDURE — 80061 LIPID PANEL: CPT

## 2022-05-02 PROCEDURE — 77030008462 HC STPLR SKN PROX J&J -A: Performed by: INTERNAL MEDICINE

## 2022-05-02 PROCEDURE — 83036 HEMOGLOBIN GLYCOSYLATED A1C: CPT

## 2022-05-02 PROCEDURE — 74011250636 HC RX REV CODE- 250/636: Performed by: NURSE ANESTHETIST, CERTIFIED REGISTERED

## 2022-05-02 DEVICE — PACE/SENSE LEAD
Type: IMPLANTABLE DEVICE | Site: CHEST | Status: FUNCTIONAL
Brand: INGEVITY™+

## 2022-05-02 DEVICE — PACEMAKER
Type: IMPLANTABLE DEVICE | Site: CHEST | Status: FUNCTIONAL
Brand: ACCOLADE™ MRI DR

## 2022-05-02 RX ORDER — MORPHINE SULFATE 2 MG/ML
2 INJECTION, SOLUTION INTRAMUSCULAR; INTRAVENOUS
Status: DISCONTINUED | OUTPATIENT
Start: 2022-05-02 | End: 2022-05-03 | Stop reason: HOSPADM

## 2022-05-02 RX ORDER — SODIUM CHLORIDE 0.9 % (FLUSH) 0.9 %
5-40 SYRINGE (ML) INJECTION EVERY 8 HOURS
Status: DISCONTINUED | OUTPATIENT
Start: 2022-05-02 | End: 2022-05-03 | Stop reason: HOSPADM

## 2022-05-02 RX ORDER — PROPOFOL 10 MG/ML
INJECTION, EMULSION INTRAVENOUS
Status: DISCONTINUED | OUTPATIENT
Start: 2022-05-02 | End: 2022-05-02 | Stop reason: HOSPADM

## 2022-05-02 RX ORDER — MIDAZOLAM HYDROCHLORIDE 1 MG/ML
INJECTION, SOLUTION INTRAMUSCULAR; INTRAVENOUS AS NEEDED
Status: DISCONTINUED | OUTPATIENT
Start: 2022-05-02 | End: 2022-05-02 | Stop reason: HOSPADM

## 2022-05-02 RX ORDER — CEFAZOLIN SODIUM/WATER 2 G/20 ML
2 SYRINGE (ML) INTRAVENOUS EVERY 8 HOURS
Status: COMPLETED | OUTPATIENT
Start: 2022-05-02 | End: 2022-05-03

## 2022-05-02 RX ORDER — SODIUM CHLORIDE 0.9 % (FLUSH) 0.9 %
5-40 SYRINGE (ML) INJECTION AS NEEDED
Status: DISCONTINUED | OUTPATIENT
Start: 2022-05-02 | End: 2022-05-03 | Stop reason: HOSPADM

## 2022-05-02 RX ORDER — PROPOFOL 10 MG/ML
INJECTION, EMULSION INTRAVENOUS AS NEEDED
Status: DISCONTINUED | OUTPATIENT
Start: 2022-05-02 | End: 2022-05-02 | Stop reason: HOSPADM

## 2022-05-02 RX ORDER — CEFAZOLIN SODIUM/WATER 2 G/20 ML
SYRINGE (ML) INTRAVENOUS AS NEEDED
Status: DISCONTINUED | OUTPATIENT
Start: 2022-05-02 | End: 2022-05-02 | Stop reason: HOSPADM

## 2022-05-02 RX ADMIN — SODIUM CHLORIDE, POTASSIUM CHLORIDE, SODIUM LACTATE AND CALCIUM CHLORIDE 75 ML/HR: 600; 310; 30; 20 INJECTION, SOLUTION INTRAVENOUS at 15:44

## 2022-05-02 RX ADMIN — PROPOFOL 100 MCG/KG/MIN: 10 INJECTION, EMULSION INTRAVENOUS at 14:16

## 2022-05-02 RX ADMIN — ACETAMINOPHEN 650 MG: 325 TABLET ORAL at 23:35

## 2022-05-02 RX ADMIN — SODIUM CHLORIDE, PRESERVATIVE FREE 10 ML: 5 INJECTION INTRAVENOUS at 15:39

## 2022-05-02 RX ADMIN — PROPOFOL 60 MG: 10 INJECTION, EMULSION INTRAVENOUS at 14:15

## 2022-05-02 RX ADMIN — SODIUM CHLORIDE, PRESERVATIVE FREE 10 ML: 5 INJECTION INTRAVENOUS at 21:51

## 2022-05-02 RX ADMIN — SODIUM CHLORIDE, PRESERVATIVE FREE 10 ML: 5 INJECTION INTRAVENOUS at 05:03

## 2022-05-02 RX ADMIN — MORPHINE SULFATE 2 MG: 2 INJECTION, SOLUTION INTRAMUSCULAR; INTRAVENOUS at 21:51

## 2022-05-02 RX ADMIN — CEFAZOLIN 2 G: 10 INJECTION, POWDER, FOR SOLUTION INTRAVENOUS at 23:28

## 2022-05-02 RX ADMIN — ASPIRIN 81 MG: 81 TABLET ORAL at 08:43

## 2022-05-02 RX ADMIN — SODIUM CHLORIDE, POTASSIUM CHLORIDE, SODIUM LACTATE AND CALCIUM CHLORIDE 75 ML/HR: 600; 310; 30; 20 INJECTION, SOLUTION INTRAVENOUS at 00:08

## 2022-05-02 RX ADMIN — PANTOPRAZOLE SODIUM 40 MG: 40 TABLET, DELAYED RELEASE ORAL at 08:43

## 2022-05-02 RX ADMIN — SODIUM CHLORIDE, PRESERVATIVE FREE 10 ML: 5 INJECTION INTRAVENOUS at 21:52

## 2022-05-02 RX ADMIN — ALLOPURINOL 300 MG: 300 TABLET ORAL at 08:42

## 2022-05-02 RX ADMIN — Medication 2 G: at 14:19

## 2022-05-02 RX ADMIN — LEVOTHYROXINE SODIUM 100 MCG: 0.1 TABLET ORAL at 08:43

## 2022-05-02 RX ADMIN — MIDAZOLAM 2 MG: 1 INJECTION INTRAMUSCULAR; INTRAVENOUS at 14:09

## 2022-05-02 RX ADMIN — PERFLUTREN 3 ML: 6.52 INJECTION, SUSPENSION INTRAVENOUS at 11:40

## 2022-05-02 NOTE — PROGRESS NOTES
TRANSFER - IN REPORT:    Verbal report received from [facility]) on Bernard Hayward  being received from ED(unit) for routine progression of care      Report consisted of patients Situation, Background, Assessment and   Recommendations(SBAR). Information from the following report(s)  was reviewed with the receiving nurse. Opportunity for questions and clarification was provided. Assessment completed upon patients arrival to unit and care assumed.

## 2022-05-02 NOTE — ANESTHESIA PREPROCEDURE EVALUATION
Relevant Problems   NEUROLOGY   (+) Amaurosis fugax of right eye      CARDIOVASCULAR   (+) Complete heart block (HCC)   (+) Coronary atherosclerosis of native coronary vessel   (+) Primary hypertension   (+) RBBB       Anesthetic History   No history of anesthetic complications            Review of Systems / Medical History  Patient summary reviewed and pertinent labs reviewed    Pulmonary        Sleep apnea (Non-compliant with CPAP)           Neuro/Psych   Within defined limits          Comments: Had amaurosis fugax in the setting of hypoperfusion from his CHB. CTA reassuring. Cardiovascular    Hypertension        Dysrhythmias (Complete Heart Block. Temporary Pacemaker in place.)   CAD and cardiac stents (2013 PCI to distal RCA)    Exercise tolerance: >4 METS  Comments: Echo (3/31/2022): EF 60-65%. Normal diastolic function. Mild MR/TR. GI/Hepatic/Renal     GERD           Endo/Other      Hypothyroidism: well controlled       Other Findings            Physical Exam    Airway  Mallampati: II  TM Distance: 4 - 6 cm  Neck ROM: normal range of motion   Mouth opening: Normal     Cardiovascular  Regular rate and rhythm,  S1 and S2 normal,  no murmur, click, rub, or gallop          Pertinent negatives: No murmur   Dental  No notable dental hx       Pulmonary  Breath sounds clear to auscultation               Abdominal  GI exam deferred       Other Findings            Anesthetic Plan    ASA: 4  Anesthesia type: total IV anesthesia          Induction: Intravenous  Anesthetic plan and risks discussed with: Patient and Spouse      Says he was told he required \"a lot of anesthesia\" for his colonoscopy.

## 2022-05-02 NOTE — PROGRESS NOTES
TRANSFER - IN REPORT:    Verbal report received from Taylor Willard RN(name) on Queen Constanza  being received from cath lab(unit) for routine progression of care      Report consisted of patients Situation, Background, Assessment and   Recommendations(SBAR). Information from the following report(s) SBAR, Procedure Summary, Intake/Output, MAR and Cardiac Rhythm NSR was reviewed with the receiving nurse. Opportunity for questions and clarification was provided. Assessment completed upon patients arrival to unit and care assumed. Dual skin assessment completed with Hilary Ireland RN and Emani Rothman RN. R groin site soft, no bleeding/hematoma noted. Palpable pedal pulses bilaterally. L chest incision site dressed, no drainage noted, left arm in sling. VSS.

## 2022-05-02 NOTE — ED NOTES
TRANSFER - OUT REPORT:    Verbal report given to Cath Lab on Tatum Lara  being transferred to Cath Lab for urgent transfer       Report consisted of patients Situation, Background, Assessment and   Recommendations(SBAR). Information from the following report(s) SBAR and ED Summary was reviewed with the receiving nurse. Lines:   Peripheral IV 05/01/22 Left Wrist (Active)   Site Assessment Clean, dry, & intact 05/01/22 1819   Phlebitis Assessment 0 05/01/22 1819   Infiltration Assessment 0 05/01/22 1819   Dressing Status Clean, dry, & intact 05/01/22 1819        Opportunity for questions and clarification was provided.       Patient transported with:   Monitor  O2 @ 2 liters  Registered Nurse

## 2022-05-02 NOTE — CONSULTS
[facility] CARDIOLOGY   History & Physical                 Primary Cardiologist: Dr. Félix Diane    Primary Care Physician: Dr. Josh Rodriges    Admitting Physician: Dr. Karolyn Luna:     Patient is a 61 y.o.  male with PMHx of CAD (s/p PCI m->dRCA 2013), HTN, HLD, GERD, and Hypothyroidism who presented to the ED following a syncopal event x1 and 2 episodes of unilateral R eye vision loss. He states that he was sitting in his recliner watching TV and spilled some water. Got up to clean it up and woke up on the floor. He is not sure how long he was lying there. [place] ok when he got up. Denies hitting his head or other injuries. After this he had 2 episodes of transient R vision loss that spontaneously returned. No syncope with the vision loss and no other s/s. He denies any CP. He does report noted increased fatigue and SOB over the past couple months. In the ED: Pt initially w/u for possible stroke with CT head and CT perfusion both negative. He was admitted by the Hospitalist and was noted to have complete heart block. While awaiting transfer to ICU, Pt had a repeat syncopal event with loss of ventricular function and loss of consciousness. This lasted approx 2-3 sec and by the time staff into room Pt had already self-converted back to [facility]. Cardiology called for urgent evaluation. CCL called in for TVPM. Pt had a total of 3 events while in ED. Started on IV Dopamine gtt while awaiting transport to CCL.        Past Medical History:   Diagnosis Date    Chest pain 3/29/2013    Edema 1/16/2017    Erectile dysfunction 1/16/2017    GERD (gastroesophageal reflux disease) 3/29/2013    Hypothyroidism 3/29/2013      Past Surgical History:   Procedure Laterality Date    HX HEART CATHETERIZATION  03/29/2013      Allergies   Allergen Reactions    Norvasc [Amlodipine] Swelling     Social History     Tobacco Use    Smoking status: Never Smoker    Smokeless tobacco: Not on file   Substance Use Topics    Alcohol use: Not on file      FH: No family history on file. Review of Systems  General: no acute weight change, no acute weakness, +fatigue, no fever or chills, no diaphoresis  Skin: no rashes, lumps, wounds, sores or other skin changes  HEENT: no headache, dizziness, lightheadedness, +vision changes -- transient loss R eye, no hearing changes, tinnitus, vertigo, sinus pressure/pain, bleeding gums, sore throat, or hoarseness  Neck: no swollen glands, goiter, pain or stiffness  Respiratory: no cough, no congestion, no sputum, no hemoptysis, +dyspnea, no wheezing  Cardiovascular: + as per HPI, no palpitations, +syncope, no orthopnea, PND  Gastrointestinal: no increased reflux, no constipation, diarrhea, liver problems, GI bleeding, N/V, no abdominal pain or distension  Urinary: no frequency, urgency, hematuria, burning/pain with urination, flank pain, polyuria, nocturia, or difficulty urinating  Peripheral Vascular: no claudication, leg cramps, prior DVTs, swelling of BLE, color change, or swelling with redness or tenderness  Musculoskeletal: no muscle or joint pain/stiffness, joint swelling, erythema of joints, or back pain  Psychiatric: no increased depression, anxiety or excessive stress  Neurological: no sensory or motor loss, seizures, +syncope, no tremors, numbness, tingling, no changes in mood, attention, or speech, no changes in orientation, memory, insight, or judgment. Hematologic: no anemia, no easy bruising or bleeding  Endocrine: +thyroid problems, no heat or cold intolerance, excessive sweating, polyuria, polydipsia, no diabetes. Objective:       Visit Vitals  BP (!) 130/96   Pulse (!) 32   Temp 97.7 °F (36.5 °C)   Resp 11   Ht 5' 9\" (1.753 m)   Wt 90.7 kg (200 lb)   SpO2 98%   BMI 29.53 kg/m²       No intake/output data recorded. No intake/output data recorded.     Physical Exam:  General: well developed, well nourished, NAD, resting comfortably  HEENT: PERRLA, no abnormalities noted, sclera clear, EOMs intact  Neck: supple, no JVD, trachea midline  Heart: S1S2 with RRR -- in CHB HR 33, without murmurs, rubs or gallops  Lungs: clear to auscultation bilaterally, normal effort on RA, no wheezing or rales  Abd: soft, nontender, nondistended, +bowel sounds  Ext: warm, no edema, calves supple/nontender, pulses 2+ bilaterally  Skin: warm and dry, intact to view  Psychiatric: appropriate mood and affect, cooperative  Neurologic: A&O X 3, moves all 4 equally, CNs intact      ECG: CHB HR 33    ECHO: limited ECHO ordered and pending    CXR: Impression: Atherosclerosis without acute cardiopulmonary abnormality. Data Review:      Recent Results (from the past 24 hour(s))   EKG, 12 LEAD, INITIAL    Collection Time: 05/01/22  5:39 PM   Result Value Ref Range    Ventricular Rate 79 BPM    Atrial Rate 80 BPM    P-R Interval 206 ms    QRS Duration 140 ms    Q-T Interval 381 ms    QTC Calculation (Bezet) 437 ms    Calculated P Axis 45 degrees    Calculated R Axis 67 degrees    Calculated T Axis -7 degrees    Diagnosis       Sinus rhythm  Ventricular premature complex  Right bundle branch block  Probable inferolateral infarct, age indeterm     CBC WITH AUTOMATED DIFF    Collection Time: 05/01/22  6:14 PM   Result Value Ref Range    WBC 11.8 (H) 4.3 - 11.1 K/uL    RBC 4.24 4.23 - 5.6 M/uL    HGB 14.7 13.6 - 17.2 g/dL    HCT 38.9 (L) 41.1 - 50.3 %    MCV 91.7 79.6 - 97.8 FL    MCH 34.7 (H) 26.1 - 32.9 PG    MCHC 37.8 (H) 31.4 - 35.0 g/dL    RDW 12.6 11.9 - 14.6 %    PLATELET 802 999 - 584 K/uL    MPV 9.5 9.4 - 12.3 FL    ABSOLUTE NRBC 0.00 0.0 - 0.2 K/uL    DF AUTOMATED      NEUTROPHILS 83 (H) 43 - 78 %    LYMPHOCYTES 11 (L) 13 - 44 %    MONOCYTES 4 4.0 - 12.0 %    EOSINOPHILS 1 0.5 - 7.8 %    BASOPHILS 0 0.0 - 2.0 %    IMMATURE GRANULOCYTES 0 0.0 - 5.0 %    ABS. NEUTROPHILS 9.8 (H) 1.7 - 8.2 K/UL    ABS. LYMPHOCYTES 1.3 0.5 - 4.6 K/UL    ABS. MONOCYTES 0.5 0.1 - 1.3 K/UL    ABS.  EOSINOPHILS 0.2 0.0 - 0.8 K/UL ABS. BASOPHILS 0.0 0.0 - 0.2 K/UL    ABS. IMM. GRANS. 0.0 0.0 - 0.5 K/UL   METABOLIC PANEL, COMPREHENSIVE    Collection Time: 05/01/22  6:14 PM   Result Value Ref Range    Sodium 135 (L) 136 - 145 mmol/L    Potassium 3.7 3.5 - 5.1 mmol/L    Chloride 99 98 - 107 mmol/L    CO2 28 21 - 32 mmol/L    Anion gap 8 7 - 16 mmol/L    Glucose 116 (H) 65 - 100 mg/dL    BUN 13 8 - 23 MG/DL    Creatinine 1.20 0.8 - 1.5 MG/DL    GFR est AA >60 >60 ml/min/1.73m2    GFR est non-AA >60 >60 ml/min/1.73m2    Calcium 9.2 8.3 - 10.4 MG/DL    Bilirubin, total 1.1 0.2 - 1.1 MG/DL    ALT (SGPT) 71 (H) 12 - 65 U/L    AST (SGOT) 38 (H) 15 - 37 U/L    Alk.  phosphatase 53 50 - 136 U/L    Protein, total 7.8 6.3 - 8.2 g/dL    Albumin 3.8 3.2 - 4.6 g/dL    Globulin 4.0 (H) 2.3 - 3.5 g/dL    A-G Ratio 1.0 (L) 1.2 - 3.5     EKG, 12 LEAD, SUBSEQUENT    Collection Time: 05/01/22  8:56 PM   Result Value Ref Range    Ventricular Rate 32 BPM    Atrial Rate 82 BPM    QRS Duration 150 ms    Q-T Interval 514 ms    QTC Calculation (Bezet) 375 ms    Calculated P Axis 0 degrees    Calculated R Axis 28 degrees    Calculated T Axis 74 degrees    Diagnosis       Complete AV block with wide QRS complex  Right bundle branch block     URINALYSIS W/ RFLX MICROSCOPIC    Collection Time: 05/01/22  9:40 PM   Result Value Ref Range    Color YELLOW      Appearance CLEAR      Specific gravity 1.010 1.001 - 1.023      pH (UA) 7.0 5.0 - 9.0      Protein Negative NEG mg/dL    Glucose Negative NEG mg/dL    Ketone Negative NEG mg/dL    Bilirubin Negative NEG      Blood Negative NEG      Urobilinogen 0.2 0.2 - 1.0 EU/dL    Nitrites Negative NEG      Leukocyte Esterase Negative NEG      Bacteria 0 0 /hpf         Assessment/Plan:   Principal Problem:    Syncope -- Pt with event earlier today and recurrent events x3 in ED, will plan to go to CCL now for TVPM, check limited ECHO in AM    Active Problems:    Primary hypertension -- monitor BP, add/adjust med as indicated Dyslipidemia  -- on [place]atha as OP      Coronary atherosclerosis of native coronary vessel -- on ASA and ARB, no BB due to hx bradycardia, no statin due to hx of myalgias      RBBB -- per last OV -- new since EKG 2017, unknown chronicity      Complete heart block -- to CCL now for emergent TVPM, plan ICU admit post-procedure, will eval for reversible causes but likely PPM in AM      Amaurosis fugax of right eye -- transient and presently resolved, Hospitalist is finishing TIA/CVA w/u                  [employer], NP-C  5/1/2022  9:54 PM

## 2022-05-02 NOTE — ANESTHESIA POSTPROCEDURE EVALUATION
Procedure(s):  INSERT PPM DUAL. total IV anesthesia    Anesthesia Post Evaluation      Multimodal analgesia: multimodal analgesia used between 6 hours prior to anesthesia start to PACU discharge  Patient location during evaluation: PACU  Patient participation: complete - patient participated  Level of consciousness: awake and alert  Pain score: 2  Pain management: satisfactory to patient  Airway patency: patent  Anesthetic complications: no  Cardiovascular status: acceptable and hemodynamically stable  Respiratory status: acceptable and spontaneous ventilation  Hydration status: acceptable  Post anesthesia nausea and vomiting:  none  Final Post Anesthesia Temperature Assessment:  Normothermia (36.0-37.5 degrees C)      INITIAL Post-op Vital signs:   Vitals Value Taken Time   /73 05/02/22 1603   Temp 36 °C (96.8 °F) 05/02/22 1535   Pulse 64 05/02/22 1606   Resp 33 05/02/22 1606   SpO2 95 % 05/02/22 1606   Vitals shown include unvalidated device data.

## 2022-05-02 NOTE — CONSULTS
Leeanne Vizcarra MD  Medical Director  [address] [place] [address], [address] [facility]  Tel: [phone]       Physical Medicine & Rehabilitation Consult    Subjective:     Date of Consultation:  May 2, 2022  Referring Provider: Dr Neisha Carrillo is a 61 y.o. male who is being evaluated at the request of the Hospitalist service for rehabilitation eval following Code S admission     HPI: The patient is a right}-hand dominant, previously functionally independent 58yo male with a PMH of CAD s/p PCI, GERD and hypothyroidism who presented to the ED after 3 syncopal episodes at home. The third of which was associated with amaurosis fugax in the right eye. He was found to have complete heart block in the ED  and prolonged ventricular asystole. Patient was placed on low-dose dopamine with resolution of events. and had witnessed syncope. CT head neg. CTA neg. He has no focal deficits. He was emergently brought to the cath lab and had a temporary pacer placed. Permanent pacer to be placed today  PT/OT pending. Principal Problem:    Syncope (5/1/2022)    Active Problems:    Primary hypertension (3/29/2013)      Dyslipidemia (3/29/2013)      Coronary atherosclerosis of native coronary vessel (1/16/2017)      Overview: 1. Unstable angina (3/29/13):       a. LHC:  LMCA: Normal.  LAD: 20% prox. 20-30% mid. LCx: Normal.  OM3:       20% prox. RCA: 20-30% proximal.  95% distal.  RPL: 30% RPL. EF 65%. Normal wall motion. b.  PCI of distal RCA:   4 X 23 mm Xience RENETTA. 2. Cardiolite at [facility] 2017. Normal       3. Echo (3/31/2022): EF 60-65%. Normal diastolic function. Mild MR/TR.       RBBB (3/28/2022)      Complete heart block ([address] (5/1/2022)      Amaurosis fugax of right eye (5/1/2022)      Past Medical History:   Diagnosis Date    Chest pain 3/29/2013    Edema 1/16/2017    Erectile dysfunction 1/16/2017    GERD (gastroesophageal reflux disease) 3/29/2013    Hypothyroidism 3/29/2013      Past Surgical History:   Procedure Laterality Date    HX HEART CATHETERIZATION  03/29/2013      No family history on file. Social History     Tobacco Use    Smoking status: Never Smoker    Smokeless tobacco: Not on file   Substance Use Topics    Alcohol use: Not on file     Prior to Admission medications    Medication Sig Start Date End Date Taking? Authorizing Provider   allopurinoL (ZYLOPRIM) 300 mg tablet Take 300 mg by mouth daily. Yes Provider, Historical   pantoprazole (Protonix) 40 mg tablet Take 40 mg by mouth daily. Yes Provider, Historical   alirocumab (PRALUENT) 150 mg/mL injector pen 1 mL by SubCUTAneous route Once every 2 weeks. 3/31/22  Yes Edwin Epstein MD   coenzyme q10 (Co Q-10) 10 mg cap Take  by mouth. Yes Provider, Historical   krill-om-3-dha-epa-phospho-ast (MegaRed Omega-3 Krill Oil) 1,000-230-60 mg cap Take  by mouth. Yes Provider, Historical   aspirin delayed-release 81 mg tablet Take 1 Tablet by mouth daily. 3/28/22  Yes Edwin Epstein MD   valsartan-hydroCHLOROthiazide (DIOVAN-HCT) 320-25 mg per tablet Take 1 Tab by mouth daily. 1/16/17  Yes Edwin Epstein MD   nitroglycerin (NITROSTAT) 0.4 mg SL tablet 1 Tab by SubLINGual route every five (5) minutes as needed for Chest Pain. 3/30/13  Yes Aurora Patel NP   levothyroxine (SYNTHROID) 100 mcg tablet Take 100 mcg by mouth Daily (before breakfast). Yes Provider, Historical   buPROPion XL (WELLBUTRIN XL) 300 mg XL tablet Take 1 Tab by mouth every morning. Patient not taking: Reported on 5/1/2022 1/16/17   Edwin Epstein MD     Allergies   Allergen Reactions    Norvasc [Amlodipine] Swelling        Review of Systems:   A comprehensive review of systems was negative except for that written in the HPI. Objective:     Vitals:  Blood pressure (!) 140/69, pulse 66, temperature 97.4 °F (36.3 °C), resp.  rate 17, height 5' 9\" (1.753 m), weight 205 lb 7.5 oz (93.2 kg), SpO2 96 %. Temp (24hrs), Av.8 °F (36.6 °C), Min:97.4 °F (36.3 °C), Max:98.2 °F (36.8 °C)      Physical Exam:  General:  Alert, oriented and mood affect appropriate   Lungs:   Clear to auscultation bilaterally. Heart:  Regular rate and rhythm, S1, S2 stable, no murmur, click, rub or gallop. Abdomen:   Soft, non-tender. Bowel sounds present. No masses,  No organomegaly. Genitourinary: defer   Neuro Muscular: No focal deficits. PERRLA EOMI  Sensation intact  No dysmetria or drift  Normoactive reflexes   Skin:  No rashes, lesions, or signs/symptoms or infection. Labs/Studies:  Recent Results (from the past 72 hour(s))   EKG, 12 LEAD, INITIAL    Collection Time: 22  5:39 PM   Result Value Ref Range    Ventricular Rate 79 BPM    Atrial Rate 80 BPM    P-R Interval 206 ms    QRS Duration 140 ms    Q-T Interval 381 ms    QTC Calculation (Bezet) 437 ms    Calculated P Axis 45 degrees    Calculated R Axis 67 degrees    Calculated T Axis -7 degrees    Diagnosis       Sinus rhythm  Ventricular premature complex  Right bundle branch block  Probable inferolateral infarct, age indeterm     CBC WITH AUTOMATED DIFF    Collection Time: 22  6:14 PM   Result Value Ref Range    WBC 11.8 (H) 4.3 - 11.1 K/uL    RBC 4.24 4.23 - 5.6 M/uL    HGB 14.7 13.6 - 17.2 g/dL    HCT 38.9 (L) 41.1 - 50.3 %    MCV 91.7 79.6 - 97.8 FL    MCH 34.7 (H) 26.1 - 32.9 PG    MCHC 37.8 (H) 31.4 - 35.0 g/dL    RDW 12.6 11.9 - 14.6 %    PLATELET 911 267 - 762 K/uL    MPV 9.5 9.4 - 12.3 FL    ABSOLUTE NRBC 0.00 0.0 - 0.2 K/uL    DF AUTOMATED      NEUTROPHILS 83 (H) 43 - 78 %    LYMPHOCYTES 11 (L) 13 - 44 %    MONOCYTES 4 4.0 - 12.0 %    EOSINOPHILS 1 0.5 - 7.8 %    BASOPHILS 0 0.0 - 2.0 %    IMMATURE GRANULOCYTES 0 0.0 - 5.0 %    ABS. NEUTROPHILS 9.8 (H) 1.7 - 8.2 K/UL    ABS. LYMPHOCYTES 1.3 0.5 - 4.6 K/UL    ABS. MONOCYTES 0.5 0.1 - 1.3 K/UL    ABS. EOSINOPHILS 0.2 0.0 - 0.8 K/UL    ABS.  BASOPHILS 0.0 0.0 - 0.2 K/UL ABS. IMM. GRANS. 0.0 0.0 - 0.5 K/UL   METABOLIC PANEL, COMPREHENSIVE    Collection Time: 05/01/22  6:14 PM   Result Value Ref Range    Sodium 135 (L) 136 - 145 mmol/L    Potassium 3.7 3.5 - 5.1 mmol/L    Chloride 99 98 - 107 mmol/L    CO2 28 21 - 32 mmol/L    Anion gap 8 7 - 16 mmol/L    Glucose 116 (H) 65 - 100 mg/dL    BUN 13 8 - 23 MG/DL    Creatinine 1.20 0.8 - 1.5 MG/DL    GFR est AA >60 >60 ml/min/1.73m2    GFR est non-AA >60 >60 ml/min/1.73m2    Calcium 9.2 8.3 - 10.4 MG/DL    Bilirubin, total 1.1 0.2 - 1.1 MG/DL    ALT (SGPT) 71 (H) 12 - 65 U/L    AST (SGOT) 38 (H) 15 - 37 U/L    Alk.  phosphatase 53 50 - 136 U/L    Protein, total 7.8 6.3 - 8.2 g/dL    Albumin 3.8 3.2 - 4.6 g/dL    Globulin 4.0 (H) 2.3 - 3.5 g/dL    A-G Ratio 1.0 (L) 1.2 - 3.5     PROCALCITONIN    Collection Time: 05/01/22  6:14 PM   Result Value Ref Range    Procalcitonin <0.05 0.00 - 0.49 ng/mL   MAGNESIUM    Collection Time: 05/01/22  6:14 PM   Result Value Ref Range    Magnesium 1.8 1.8 - 2.4 mg/dL   TSH 3RD GENERATION    Collection Time: 05/01/22  6:14 PM   Result Value Ref Range    TSH 0.957 0.358 - 3.740 uIU/mL   EKG, 12 LEAD, SUBSEQUENT    Collection Time: 05/01/22  8:56 PM   Result Value Ref Range    Ventricular Rate 32 BPM    Atrial Rate 82 BPM    QRS Duration 150 ms    Q-T Interval 514 ms    QTC Calculation (Bezet) 375 ms    Calculated P Axis 0 degrees    Calculated R Axis 28 degrees    Calculated T Axis 74 degrees    Diagnosis       Complete AV block with wide QRS complex  Right bundle branch block     URINALYSIS W/ RFLX MICROSCOPIC    Collection Time: 05/01/22  9:40 PM   Result Value Ref Range    Color YELLOW      Appearance CLEAR      Specific gravity 1.010 1.001 - 1.023      pH (UA) 7.0 5.0 - 9.0      Protein Negative NEG mg/dL    Glucose Negative NEG mg/dL    Ketone Negative NEG mg/dL    Bilirubin Negative NEG      Blood Negative NEG      Urobilinogen 0.2 0.2 - 1.0 EU/dL    Nitrites Negative NEG      Leukocyte Esterase Negative NEG      Bacteria 0 0 /hpf   DRUG SCREEN, URINE    Collection Time: 05/01/22  9:40 PM   Result Value Ref Range    PCP(PHENCYCLIDINE) Negative      BENZODIAZEPINES Negative      COCAINE Negative      AMPHETAMINES Negative      METHADONE Negative      THC (TH-CANNABINOL) Negative      OPIATES Negative      BARBITURATES Negative     TROPONIN-HIGH SENSITIVITY    Collection Time: 05/01/22 11:27 PM   Result Value Ref Range    Troponin-High Sensitivity 148.6 (HH) 0 - 14 pg/mL   TROPONIN-HIGH SENSITIVITY    Collection Time: 05/02/22  1:00 AM   Result Value Ref Range    Troponin-High Sensitivity 187.9 (HH) 0 - 14 pg/mL   LIPID PANEL    Collection Time: 05/02/22  2:40 AM   Result Value Ref Range    Cholesterol, total 129 <200 MG/DL    Triglyceride 193 (H) 35 - 150 MG/DL    HDL Cholesterol 42 40 - 60 MG/DL    LDL, calculated 48.4 <100 MG/DL    VLDL, calculated 38.6 (H) 6.0 - 23.0 MG/DL    CHOL/HDL Ratio 3.1     HEMOGLOBIN A1C WITH EAG    Collection Time: 05/02/22  2:40 AM   Result Value Ref Range    Hemoglobin A1c 5.3 4.20 - 6.30 %    Est. average glucose 105 mg/dL   T4, FREE    Collection Time: 05/02/22  2:40 AM   Result Value Ref Range    T4, Free 1.2 0.9 - 1.8 NG/DL   SED RATE, AUTOMATED    Collection Time: 05/02/22  2:40 AM   Result Value Ref Range    Sed rate, automated 11 0 - 15 mm/hr   MAGNESIUM    Collection Time: 05/02/22  2:40 AM   Result Value Ref Range    Magnesium 2.0 1.8 - 2.4 mg/dL   CK    Collection Time: 05/02/22  2:40 AM   Result Value Ref Range     (H) 21 - 215 U/L   AMMONIA    Collection Time: 05/02/22  2:40 AM   Result Value Ref Range    Ammonia, plasma 30 11 - 32 UMOL/L   CBC WITH AUTOMATED DIFF    Collection Time: 05/02/22  2:40 AM   Result Value Ref Range    WBC 7.6 4.3 - 11.1 K/uL    RBC 4.04 (L) 4.23 - 5.6 M/uL    HGB 13.4 (L) 13.6 - 17.2 g/dL    HCT 36.8 (L) 41.1 - 50.3 %    MCV 91.1 79.6 - 97.8 FL    MCH 33.2 (H) 26.1 - 32.9 PG    MCHC 36.4 (H) 31.4 - 35.0 g/dL    RDW 12.4 11.9 - 14.6 %    PLATELET 761 866 - 079 K/uL    MPV 9.1 (L) 9.4 - 12.3 FL    ABSOLUTE NRBC 0.00 0.0 - 0.2 K/uL    DF AUTOMATED      NEUTROPHILS 66 43 - 78 %    LYMPHOCYTES 25 13 - 44 %    MONOCYTES 7 4.0 - 12.0 %    EOSINOPHILS 2 0.5 - 7.8 %    BASOPHILS 0 0.0 - 2.0 %    IMMATURE GRANULOCYTES 0 0.0 - 5.0 %    ABS. NEUTROPHILS 5.0 1.7 - 8.2 K/UL    ABS. LYMPHOCYTES 1.9 0.5 - 4.6 K/UL    ABS. MONOCYTES 0.5 0.1 - 1.3 K/UL    ABS. EOSINOPHILS 0.2 0.0 - 0.8 K/UL    ABS. BASOPHILS 0.0 0.0 - 0.2 K/UL    ABS. IMM.  GRANS. 0.0 0.0 - 0.5 K/UL   METABOLIC PANEL, BASIC    Collection Time: 05/02/22  2:40 AM   Result Value Ref Range    Sodium 135 (L) 136 - 145 mmol/L    Potassium 3.9 3.5 - 5.1 mmol/L    Chloride 99 98 - 107 mmol/L    CO2 29 21 - 32 mmol/L    Anion gap 7 7 - 16 mmol/L    Glucose 116 (H) 65 - 100 mg/dL    BUN 14 8 - 23 MG/DL    Creatinine 1.10 0.8 - 1.5 MG/DL    GFR est AA >60 >60 ml/min/1.73m2    GFR est non-AA >60 >60 ml/min/1.73m2    Calcium 9.1 8.3 - 10.4 MG/DL   TROPONIN-HIGH SENSITIVITY    Collection Time: 05/02/22  2:40 AM   Result Value Ref Range    Troponin-High Sensitivity 220.8 (HH) 0 - 14 pg/mL       Functional Assessment:  Functional Assessment  Baseline Functional Status: Ambulated independently  Fall in Past 12 Months: Yes  Fall With Injury: No  Decline in Gait/Transfer/Balance: No  Decline in Capacity to Feed/Dress/Bathe: No  Developmental Delay: No  Chewing/Swallowing Problems: No  Difficulty with Secretions: No  Speech Slurred/Thick/Garbled: No       Ambulation:  Activity and Safety  Activity Level: Bed Rest  Ambulate: No (Comment)  Activity: In bed,Resting quietly  Activity Assistance: Partial (two people)  Weight Bearing Status: WBAT (Weight Bearing as Tolerated)  Mode of Transportation: Stretcher,IV equipment,Other (comment) (moniter)  Repositioned: Reverse trendelenberg  Patient Turned: Supine  Head of Bed Elevated: HOB less then 20  Assistive Device: Fall prevention device  Safety Measures: Bed/Chair alarm on,Bed in low position,Call light within reach,Fall prevention (comment),Side rails X 3,Nurse at bedside  Venipuncture/BP Precautions: Venipuncture/BP precautions RLE     Impression / Assessment:     Principal Problem:    Syncope (5/1/2022)    Active Problems:    Primary hypertension (3/29/2013)      Dyslipidemia (3/29/2013)      Coronary atherosclerosis of native coronary vessel (1/16/2017)      Overview: 1. Unstable angina (3/29/13):       a. LHC:  LMCA: Normal.  LAD: 20% prox. 20-30% mid. LCx: Normal.  OM3:       20% prox. RCA: 20-30% proximal.  95% distal.  RPL: 30% RPL. EF 65%. Normal wall motion. b.  PCI of distal RCA:   4 X 23 mm Xience RENETTA. 2. Cardiolite at [facility] 2017. Normal       3. Echo (3/31/2022): EF 60-65%. Normal diastolic function. Mild MR/TR. RBBB (3/28/2022)      Complete heart block (HCC) (5/1/2022)      Amaurosis fugax of right eye (5/1/2022)      Syncope due to complete heart block and ventricular standstill ,s/o pacer  Plan / Recommendations / Medical Decision Making:     Recommendations:   Continue Acute Rehab Program  Coordination of rehab / medical care  Counseling of PM&R care issues management  Monitoring and management of medical conditions per plan of care / orders  -pt without functional deficits  -amaurosis fugax secondary to hypoperfusion due to heart block, now resolved  -cardiac event, not neurologic  -no further rehab required  Discussion with pt / Staff  Reviewed Therapies / Karishma Linton / Daryl Villegas / Records  Thank you very much for this referral. We appreciate the opportunity to participate in this patient's care. Rosa Isela Tiwari MD, Medical Director  [address] [place] [address]

## 2022-05-02 NOTE — PROGRESS NOTES
05/01/22 5667   Dual Skin Pressure Injury Assessment   Dual Skin Pressure Injury Assessment WDL   Second Care Provider (Based on [address] [place] [address]) Michelle Rajan RN   Skin Integumentary   Skin Integumentary (WDL) X    Pressure  Injury Documentation No Pressure Injury Noted-Pressure Ulcer Prevention Initiated   Skin Color Appropriate for ethnicity   Skin Condition/Temp Warm;Dry   Skin Integrity Incision (comment)  (R groin sheath)   Turgor Non-tenting   Hair Growth Present   Nails WDL   Wound Prevention and Protection Methods   Orientation of Wound Prevention Posterior   Location of Wound Prevention Sacrum/Coccyx   Dressing Present  Yes   Dressing Status Intact   Wound Offloading (Prevention Methods) Bed, pressure redistribution/air; Foam silicone;Pillows;Repositioning

## 2022-05-02 NOTE — PROGRESS NOTES
MRI cannot be performed while patient has a temporary pacemaker and cannot be performed for at least 6 weeks after permanent pacemaker insertion. Nurse aware, order canceled.

## 2022-05-02 NOTE — PROGRESS NOTES
Chart reviewed and pt discussed in am IDR s/p admission to ICU for syncope/CHB. Pt seen in bed, currently alert and oriented, wife at bedside. Confirms demographics. Lives with spouse. Independent of ADL's. Drives self. No DME's for ambulation or current HH or rehab. Plans for PPM today by cardiology. Aware CM to follow for any assist and d/c needs/POC when stable per MD.     Care Management Interventions  PCP Verified by CM: Yes  Mode of Transport at Discharge:  Other (see comment)  Transition of Care Consult (CM Consult): Discharge Planning  Discharge Durable Medical Equipment:  (CPAP)  Support Systems: Spouse/Significant Other,Child(red),Other Family Member(s)  Confirm Follow Up Transport: Family  Freedom of Choice List was Provided with Basic Dialogue that Supports the Patient's Individualized Plan of Care/Goals, Treatment Preferences and Shares the Quality Data Associated with the Providers?: Yes  [employer] Information Provided?:  (Generic comm/[employer] from his previous work where he retired. )  Discharge Location  Patient Expects to be Discharged to[de-identified] Unable to determine at this time

## 2022-05-02 NOTE — H&P
Admit date: 2022   Name:  Marilia Arriaza   Age:  61 y.o.   :  1955   MRN:  [id number]   PCP:  Sadiq Lindsay NP   Provider:  Shannan Richardson MD      ASSESSMENT AND PLAN  74-year-old male with a past medical history of GERD, hypothyroidism, hypertension, coronary artery disease, hyperlipidemia, presents in the setting of loss of consciousness and right visual loss    1. Syncopal episode and concerns of acute ischemic CVA  -CT brain without intracranial bleeding  -Not a candidate for TPA  -CTA of head and neck without large vessel occlusion  -Obtain MRI of the brain  -Continue aspirin  -Does not tolerate statins, currently on alirocumab  -Telemetry monitoring  -Neurochecks every 4 hours  -TSH, T4, lipid panel, A1c  -SLP, PT, OT evaluation  -Permissive hypertension  -Start IV fluids  -Obtain urinalysis and procalcitonin  -No signs of pneumonia on chest x-ray  -Echocardiogram on 3/30/2022 ejection fraction of 60 to 85%, normal diastolic function, no aortic stenosis  -Neurology consultation     2. Hyponatremia likely hypovolemic  -Start isotonic IV fluids  -Monitor sodium levels  -Avoid rapid correction    3. Hypertension  -Hold losartan and hydrochlorothiazide for now    4. Hypothyroidism  -Continue levothyroxine    5. Hyperlipidemia  -On outpatient alirocumab    DVT prophylaxis with Lovenox    Full code  Patient aware of plan      CHIEF COMPLAINT:  Loss of consciousness, right visual loss      HISTORY OF PRESENT ILLNESS:  74-year-old male with a past medical history of GERD, hypothyroidism, hypertension, coronary artery disease, hyperlipidemia, presents in the setting of loss of consciousness and right visual loss. The patient states that he was in his usual state of health until this afternoon when he dropped his cup of coffee and he stood up to pick it up and felt lightheaded and lost consciousness.   After a few minutes he woke up and continued to feel lightheaded and lost consciousness another couple of times. Prior to the episodes he denies any chest pain or palpitations. Also denies any tongue biting or urinary or fecal incontinence. After the third time that he passed out he was brought to the emergency department. Otherwise he denies any fever or chills, no shortness of breath, no chest pain, no abdominal pain, no nausea vomiting or diarrhea. In the emergency department the patient was found to be hemodynamically stable. CT of the brain without acute intracranial abnormality. CT of the brain and neck without large vessel occlusion. He will be admitted to the medical floors for further management. Past Medical History:   Diagnosis Date    Chest pain 3/29/2013    Edema 1/16/2017    Erectile dysfunction 1/16/2017    GERD (gastroesophageal reflux disease) 3/29/2013    Hypothyroidism 3/29/2013       Past Surgical History:   Procedure Laterality Date    HX HEART CATHETERIZATION  03/29/2013          HOME MEDICATION:  Prior to Admission medications    Medication Sig Start Date End Date Taking? Authorizing Provider   alirocumab (PRALUENT) 150 mg/mL injector pen 1 mL by SubCUTAneous route Once every 2 weeks. 3/31/22   Jacob Epstein MD   coenzyme q10 (Co Q-10) 10 mg cap Take  by mouth. Provider, Historical   krill-om-3-dha-epa-phospho-ast (MegaRed [place]-3 Krill Oil) 1,000-230-60 mg cap Take  by mouth. Provider, Historical   aspirin delayed-release 81 mg tablet Take 1 Tablet by mouth daily. 3/28/22   Jacob Epstein MD   buPROPion XL (WELLBUTRIN XL) 300 mg XL tablet Take 1 Tab by mouth every morning. 1/16/17   Jacob Epstein MD   valsartan-hydroCHLOROthiazide (DIOVAN-HCT) 320-25 mg per tablet Take 1 Tab by mouth daily. 1/16/17   Jacob Epstein MD   nitroglycerin (NITROSTAT) 0.4 mg SL tablet 1 Tab by SubLINGual route every five (5) minutes as needed for Chest Pain.  3/30/13   Aurora Patel NP   levothyroxine (SYNTHROID) 100 mcg tablet Take 100 mcg by mouth Daily (before breakfast). Provider, Historical         REVIEW OF SYSTEMS:  14 ROS negative except from stated on HPI      Social History     Tobacco Use    Smoking status: Never Smoker    Smokeless tobacco: Not on file   Substance Use Topics    Alcohol use: Not on file    Drug use: Not on file       Family history  No DM    Allergies   Allergen Reactions    Norvasc [Amlodipine] Swelling         Vitals:    05/01/22 1801 05/01/22 1816 05/01/22 1916 05/01/22 1931   BP: (!) 162/97 125/63 (!) 130/42 124/75   Pulse: 81 80 79 76   Resp: 13 19 14 17   Temp:       SpO2: 96% 96% 97% 95%   Weight:       Height:             PHYSICAL EXAM:  General: Alert, oriented, NAD  HEENT: NC/AT, EOM are intact  Neck: supple, no JVD  Cardiovascular: RRR, S1, S2, no murmurs  Respiratory: Lungs are clear, no wheezes or rales  Abdomen: Soft, NT, ND  Back: No CVA tenderness, no paraspinal tenderness  Extremities: LE without pedal edema, no erythema  Neuro: A&O, CN are intact, no focal deficits  Skin: no rash or ulcers  Psych: good mood and affect      I have personally reviewed patients laboratory data showing  Lab Results   Component Value Date    WBC 11.8 (H) 05/01/2022    HGB 14.7 05/01/2022    HCT 38.9 (L) 05/01/2022    MCV 91.7 05/01/2022     05/01/2022     No results found for: CKMB  Lab Results   Component Value Date     (H) 05/01/2022     (L) 05/01/2022    K 3.7 05/01/2022    CO2 28 05/01/2022    CL 99 05/01/2022    BUN 13 05/01/2022         I have personally reviewed patients EKG showing  Sinus rhythm, right bundle branch block      I have personally reviewed patients imaging showing  CTA CODE NEURO HEAD AND NECK W CONT   ED Interpretation   Preliminary report. Full dictation to follow. 1. No large vessel occlusion. CT HEAD WO CONT   Final Result   No acute intracranial abnormality. XR CHEST PORT   Final Result   Atherosclerosis without acute cardiopulmonary abnormality.           MRI BRAIN WO CONT    (Results Pending)         Likely length of stay more than 2 midnights

## 2022-05-02 NOTE — INTERDISCIPLINARY ROUNDS
Interdisciplinary team rounds were held 5/2/2022 with the following team members:Care Management, Nursing, Nurse Practitioner, Nutrition, Occupational Therapy, Palliative Care, Pastoral Care, Pharmacy, Physical Therapy, Physician, Respiratory Therapy and Clinical Coordinator and the patient. Plan of care discussed. See clinical pathway and/or care plan for interventions and desired outcomes.

## 2022-05-02 NOTE — CONSULTS
Consult    Patient: Jose Polanco MRN: [id number]     YOB: 1955  Age: 61 y.o. Sex: male      Subjective:      Jose Polanco is a 61 y.o. male who is being seen for amaurosis fugax. The patient had a sudden episode of vision loss in the right eye. He had complete and sudden loss of vision in his right eye. This was around the time of a syncopal episode. He was found to have a right heart block. CTA is reassuring. CT scan of the head is also reassuring. He has no focal neurological deficits. He currently has a temporary pacer and there are plans put a permanent pacemaker in today    Past Medical History:   Diagnosis Date    Chest pain 3/29/2013    Edema 1/16/2017    Erectile dysfunction 1/16/2017    GERD (gastroesophageal reflux disease) 3/29/2013    Hypothyroidism 3/29/2013     Past Surgical History:   Procedure Laterality Date    HX HEART CATHETERIZATION  03/29/2013      No family history on file.   Social History     Tobacco Use    Smoking status: Never Smoker    Smokeless tobacco: Not on file   Substance Use Topics    Alcohol use: Not on file      Current Facility-Administered Medications   Medication Dose Route Frequency Provider Last Rate Last Admin    sodium chloride (NS) flush 5-10 mL  5-10 mL IntraVENous PRN Richie Naranjo MD   10 mL at 05/01/22 1908    sodium chloride (NS) flush 5-40 mL  5-40 mL IntraVENous Q8H Abner Trammell MD   10 mL at 05/02/22 0503    sodium chloride (NS) flush 5-40 mL  5-40 mL IntraVENous PRN Kristy Coates MD        acetaminophen (TYLENOL) tablet 650 mg  650 mg Oral Q6H PRN Kristy Coates MD        Or    acetaminophen (TYLENOL) suppository 650 mg  650 mg Rectal Q6H PRN Kristy Coates MD        polyethylene glycol (MIRALAX) packet 17 g  17 g Oral DAILY PRN Krisyt Coates MD        ondansetron (ZOFRAN ODT) tablet 4 mg  4 mg Oral Q8H PRN Kristy Coates MD        Or    ondansetron [facility] injection 4 mg  4 mg IntraVENous Q6H PRN Oracio Weldon MD        enoxaparin (LOVENOX) injection 40 mg  40 mg SubCUTAneous DAILY Abner Arora MD        aspirin delayed-release tablet 81 mg  81 mg Oral DAILY Erika Brumfield MD   81 mg at 05/02/22 1299    levothyroxine (SYNTHROID) tablet 100 mcg  100 mcg Oral JIMENEZ Brumfield MD   100 mcg at 05/02/22 0843    [Held by provider] valsartan (DIOVAN) 320 mg, hydroCHLOROthiazide (HYDRODIURIL) 25 mg   Oral DAILY Oracio Weldon MD        pantoprazole (PROTONIX) tablet 40 mg  40 mg Oral JIMENEZ Brumfield MD   40 mg at 05/02/22 3713    allopurinoL (ZYLOPRIM) tablet 300 mg  300 mg Oral DAILY Erika Brumfield MD   300 mg at 05/02/22 6057    lactated Ringers infusion  75 mL/hr IntraVENous CONTINUOUS Erika Brumfield MD 75 mL/hr at 05/02/22 0008 75 mL/hr at 05/02/22 0008    sodium chloride (NS) flush 5-40 mL  5-40 mL IntraVENous Q8H Abner Arora MD   10 mL at 05/02/22 0503    sodium chloride (NS) flush 5-40 mL  5-40 mL IntraVENous PRN Erika Brumfield MD        DOPamine (INTROPIN) 800 mg in dextrose 5% 250 mL infusion  0-20 mcg/kg/min IntraVENous TITRATE Samantha Webster NP   Stopped at 05/01/22 2242        Allergies   Allergen Reactions    Norvasc [Amlodipine] Swelling       Review of Systems:  CONSTITUTIONAL: No weight loss, fever, chills, weakness or fatigue. HEENT: Eyes: No visual loss, blurred vision, double vision or yellow sclerae. Ears, Nose, Throat: No hearing loss, sneezing, congestion, runny nose or sore throat. SKIN: No rash or itching. CARDIOVASCULAR: No chest pain, chest pressure or chest discomfort. No palpitations or edema. RESPIRATORY: No shortness of breath, cough or sputum. GASTROINTESTINAL: No anorexia, nausea, vomiting or diarrhea. No abdominal pain or blood. GENITOURINARY: no burning with urination.    NEUROLOGICAL: No headache, dizziness, syncope, paralysis, ataxia, numbness or tingling in the extremities. No change in bowel or bladder control. MUSCULOSKELETAL: No muscle, back pain, joint pain or stiffness. HEMATOLOGIC: No anemia, bleeding or bruising. LYMPHATICS: No enlarged nodes. No history of splenectomy. PSYCHIATRIC: No history of depression or anxiety. ENDOCRINOLOGIC: No reports of sweating, cold or heat intolerance. No polyuria or polydipsia. ALLERGIES: No history of asthma, hives, eczema or rhinitis. Objective:     Vitals:    05/02/22 0800 05/02/22 0815 05/02/22 0830 05/02/22 0845   BP: 138/76 137/76 132/72 119/72   Pulse: 67 66 61 72   Resp: 19  25 27   Temp:       SpO2: 98% 96% 96% 95%   Weight:       Height:            Physical Exam:  General - Well developed, well nourished, in no apparent distress. Pleasant and conversant. HEENT - Normocephalic, atraumatic. Conjunctiva, tympanic membranes, and oropharynx are clear. Neck - Supple without masses, no bruits   Cardiovascular - Regular rate and rhythm. Lungs - Clear to auscultation. Abdomen - Soft, nontender with normal bowel sounds. Extremities - Peripheral pulses intact. No edema and no rashes. Neurological examination - Comprehension, attention , memory and reasoning are intact. Language and speech are normal. On cranial nerve examination pupils are equal round and reactive to light. Fundoscopic examination is normal. Visual acuity is adequate. Visual fields are full to finger confrontation. Extraocular motility is normal. Face is symmetric and sensation is intact to light touch. Hearing is intact to finger rustle bilaterally. Motor examination - There is normal muscle tone and bulk. Power is full throughout. Muscle stretch reflexes are normoactive and there are no pathological reflexes present. Sensation is intact to light touch, pinprick, vibration and proprioception in all extremities.  Cerebellar examination is normal.     NIHSS   NIHSS Score: 0  1a-Level of Consciousness 0  1b-What is Month/Age 0  1c-Open/Close Eyes&Hand 0  2 -Best Gaze 0  3 -Visual Fields 0  4 -Facial Palsy 0  5a-Motor-Left Arm 0  5b-Motor-Right Arm 0  6a-Motor-Left Leg 0  6b-Motor-Right Leg 0  7 -Limb Ataxia 0  8 -Sensory 0  9 -Best Language 0  10-Dysarthria 0  11-Extinction/Inattention 0    Lab Results   Component Value Date/Time    Cholesterol, total 129 05/02/2022 02:40 AM    HDL Cholesterol 42 05/02/2022 02:40 AM    LDL, calculated 48.4 05/02/2022 02:40 AM    VLDL, calculated 38.6 (H) 05/02/2022 02:40 AM    Triglyceride 193 (H) 05/02/2022 02:40 AM    CHOL/HDL Ratio 3.1 05/02/2022 02:40 AM        Lab Results   Component Value Date/Time    Hemoglobin A1c 5.3 05/02/2022 02:40 AM        CT Results (most recent): Personally Reviewed   Results from [place] encounter on 05/01/22    CTA CODE NEURO HEAD AND NECK W CONT    Narrative  Title:  CT arteriogram of the neck and head. Indication: Transient loss of vision in the right eye. Technique: Axial images of the neck and head were obtained after the uneventful  administration of intravenous iodinated contrast media. Contrast was used to  best identify the arterial structures. Images were reviewed on a separate, free  standing, three-dimensional workstation as per the referring physicians request.      All stenosis percentages derived by comparing the narrowest segment with the  distal Internal Carotid Artery luminal diameter, as described in the Michael  American Symptomatic Carotid Endarterectomy Trial (NASCET) criteria. All CT scans at this facility are performed using dose reduction/dose modulation  techniques, as appropriate the performed exam, including the following:  Automated Exposure Control; Adjustment of the mA and/or kV according to patient  size (this includes techniques or standardized protocols for targeted exams  where dose is matched to indication/reason for exam); and Use of Iterative  Reconstruction Technique. Comparison: None.     Findings:    Lungs: Interstitial and nodular opacities of the upper lungs, possibly edema  versus an atypical infection  Soft Tissues: Some mucosal thickening primarily in the left maxillary sinus  Cervical Spine: Degenerative changes  Aorta:  Arch. Mild atherosclerosis  Great Vessels: Patent    Right ICA: Patent  % Stenosis: 0  Right MCA: Patent  Right BARRETT: Patent    Left ICA: Minimal plaque  % Stenosis: 0  Left MCA: Patent  Left BARRETT: Patent    Right Vertebral: Patent  Left Vertebral: Patent  Dominance: Codominant  Basilar: Patent  Right PCA: Patent. Fetal origin  Left PCA: Patent. Prominent posterior communicating artery    Other Vascular: Negative    Impression  No evidence of large vessel occlusion. Scattered nodular opacities  in the lungs, possibly an atypical infection      Results for orders placed or performed during the hospital encounter of 05/01/22   EKG, 12 LEAD, INITIAL   Result Value Ref Range    Ventricular Rate 79 BPM    Atrial Rate 80 BPM    P-R Interval 206 ms    QRS Duration 140 ms    Q-T Interval 381 ms    QTC Calculation (Bezet) 437 ms    Calculated P Axis 45 degrees    Calculated R Axis 67 degrees    Calculated T Axis -7 degrees    Diagnosis       Sinus rhythm  Ventricular premature complex  Right bundle branch block  Probable inferolateral infarct, age indeterm                Assessment:     82-year-old man with an episode of amaurosis fugax which is secondary to hypoperfusion in the setting of heart block. CTA is reassuring. Plan:     Amaurosis fugax is secondary to hypoperfusion in setting of heart block. No further recommendations.     Signed By: Jeanie Deluna DO     May 2, 2022

## 2022-05-02 NOTE — PROGRESS NOTES
Physical Therapy Note:    PT orders received and chart reviewed. Evaluation attempted however pt currently with temp pacer. Going for permanent pacer later today. Will check back later as patient's condition permits.     Vinay Finnegan, PT, DPT

## 2022-05-02 NOTE — PROGRESS NOTES
Comprehensive Nutrition Assessment    Type and Reason for Visit: Initial,Positive nutrition screen  Best Practice Alert for Malnutrition Screening Tool: Recently Lost Weight Without Trying: No, If Yes, How Much Weight Loss: Unsure, Eating Poorly Due to Decreased Appetite: No    Nutrition Recommendations/Plan:    Continue current diet. Anticipate diet to resume post procedure. Malnutrition Assessment:  Malnutrition Status: No malnutrition    Nutrition Assessment:   Nutrition History:    Cultural/Zoroastrianism/Ethnic Food Preference(s): None Patient denies any barriers to PO prior to admission. He states usually eats 2 meals per day, either breakfast and lunch with a lite dinner or lunch and dinner. He states weight has been stable. Nutrition Background:   Patient with PMH significant for GERD, HTN, CAD, HLD. He presented with loss of consciousness and was found to have complete heart block. Plan for pacemaker today 5/2. Nutrition Interval:  Patient seen reclined in bed with wife and daughter present. He states that he is hungry but diet held due to upcoming procedure. He states that his RN got him a late dinner tray last night which he ate well. Nutrition Related Findings:   No signs of wasting      Current Nutrition Therapies:  ADULT DIET Regular; Low Sodium (2 gm)    Current Intake:   Average Meal Intake: NPO (100% dinner prior to NPO)        Anthropometric Measures:  Height: 5' 9\" (175.3 cm)  Current Body Wt: 93.2 kg (205 lb 7.5 oz) (5/2), Weight source: Bed scale  BMI: 30.3, Obese class 1 (BMI 30.0-34. 9)  Admission Body Weight: 199 lb 15.3 oz (5/1 pt stated)  Ideal Body Weight (lbs) (Calculated): 160 lbs (73 kg), 128.4 %  Usual Body Wt: 92.1 kg (203 lb) (per EMR, limited history), Percent weight change: 1.2       Edema: No data recorded  Estimated Daily Nutrient Needs:  Energy (kcal/day): 3152-8447 (Kcal/kg (18-23) Weight used: Current 93.2 kg  Protein (g/day):  (20% kcal) Weight Used: (Other (specify))    Fluid (ml/day):   (1 ml/kcal)    Nutrition Diagnosis:   · Inadequate oral intake related to  (medical status) as evidenced by NPO or clear liquid status due to medical condition    Nutrition Interventions:   Food and/or Nutrient Delivery: Continue current diet (to resume post procedure)     Coordination of Nutrition Care: Continue to monitor while inpatient       Goals:       Active Goal:  (Resume diet)       Nutrition Monitoring and Evaluation:      Food/Nutrient Intake Outcomes: Food and nutrient intake  Physical Signs/Symptoms Outcomes: Meal time behavior,Weight    Discharge Planning:    No discharge needs at this time    [address] [place] on 5/2/2022 at 1:23 PM  Contact: [phone]

## 2022-05-02 NOTE — PROGRESS NOTES
SPEECH PATHOLOGY NOTE:    Speech therapy consult received and appreciated. Dysphagia evaluation attempted this AM, but patient currently NPO for procedure later today. Will follow up at later time once he is cleared to participate.      Finley Angelucci, MSP, CCC-SLP  Speech Language Pathologist  [facility]  Contact: Dustin

## 2022-05-02 NOTE — ACP (ADVANCE CARE PLANNING)
Advance Care Planning     General Advance Care Planning (ACP) Conversation      Date of Conversation: 5/1/2022    Healthcare Decision Maker:     Primary Decision Maker: Giovanni Query - Spouse - [phone]  Click here to complete [id number] including selection of the Healthcare Decision Maker Relationship (ie \"Primary\")      Today we documented Decision Maker(s) consistent with Legal Next of Kin hierarchy. Content/Action Overview:   No LW/HCPOA on file currently. Pt does state that he has one at home. Spouse legal NOK unless document presented stating otherwise. Full code per MD orders.       Length of Voluntary ACP Conversation in minutes:  <16 minutes (Non-Billable)    Garett Brooke RN

## 2022-05-02 NOTE — MANAGEMENT PLAN
Cardiology Management Plan    54TA WM with a history of HTN, HLD, CAD s/p PCI in 2013 admitted with recurrent syncope and CHB requiring temporary pacing wire and will require PPM for symptomatic bradycardia. I had a discussion today with the patient regarding the risks, benefits, and alternatives of an implantable cardiac rhythm device. I discussed in detail the potential benefits of pacemaker therapy that are largely aimed at improving symptoms from symptomatic bradycardia. Additionally, I discussed with the patient the potential risks device implantation, including the risk of bleeding, infection, large blood vessel injury, lung or cardiac injury, venous occlusion, DVT/PE, pneumothorax, cardiac tamponade, perforation, need for urgent open heart surgery or additional procedures, device/lead failure, lead dislodgement, heart attack, stroke, arrhythmia, oversedation, respiratory arrest, and even death. After our comprehensive discussion, the patient would like to proceed.

## 2022-05-02 NOTE — PROGRESS NOTES
TRANSFER - IN REPORT:    Verbal report received from Cath Lab(name) on Kiet Crawford  being received from cath lab(unit) for routine post - op      Report consisted of patients Situation, Background, Assessment and   Recommendations(SBAR). Information from the following report(s) SBAR, Kardex, Procedure Summary, MAR and Cardiac Rhythm vpaced was reviewed with the receiving nurse. Opportunity for questions and clarification was provided. Assessment completed upon patients arrival to unit and care assumed.

## 2022-05-02 NOTE — PROGRESS NOTES
TRANSFER - OUT REPORT:    Verbal report given to DIMA Pereira(name) on Tom Rangel  being transferred to cath lab(unit) for ordered procedure       Report consisted of patients Situation, Background, Assessment and   Recommendations(SBAR). Information from the following report(s) SBAR, ED Summary, Intake/Output, MAR, Recent Results, Med Rec Status and Cardiac Rhythm NSR was reviewed with the receiving nurse. Lines:   Peripheral IV 05/01/22 Left Wrist (Active)   Site Assessment Clean, dry, & intact 05/02/22 0730   Phlebitis Assessment 0 05/02/22 0730   Infiltration Assessment 0 05/02/22 0730   Dressing Status Clean, dry, & intact 05/02/22 0730   Dressing Type Transparent;Tape 05/02/22 0730   Hub Color/Line Status Patent 05/02/22 0730       Peripheral IV 05/02/22 Anterior;Right Forearm (Active)   Site Assessment Clean, dry, & intact 05/02/22 0730   Phlebitis Assessment 0 05/02/22 0730   Infiltration Assessment 0 05/02/22 0730   Dressing Status Clean, dry, & intact 05/02/22 0730   Dressing Type Transparent;Tape 05/02/22 0730   Hub Color/Line Status Patent 05/02/22 0730        Opportunity for questions and clarification was provided.       Patient transported with:   Monitor  Registered Nurse

## 2022-05-02 NOTE — PROGRESS NOTES
Hospitalist Progress Note   Admit Date:  2022  5:26 PM   Name:  Queen Constanza   Age:  61 y.o. Sex:  male  :  1955   MRN:  [id number]   Room:  [address] [place] [address]    Presenting Complaint: Syncope    Reason(s) for Admission: Syncope [R55]     Hospital Course & Interval History: This is a 46-year-old male with past medical history significant for GERD, hypothyroidism, hypertension, coronary artery disease, dyslipidemia presents in the setting of loss of consciousness and vision loss in the right eye. Patient was in his usual state of health until the afternoon of presentation when he dropped his cup of coffee and is due to pick it up and felt lightheaded and lost consciousness. No chest pain no palpitations. No jerking movements of extremities tongue bite or urinary or fecal incontinence. CT head negative. EKG showed complete heart block. Patient developed transient loss of consciousness as well as amaurosis fugax. Cardiology was consulted. Patient underwent emergent temporary pacemaker implantation. Plans for permanent pacemaker placement. Neurology was consulted for amaurosis fugax. Subjective/24hr Events (22): Patient is doing well this morning. No chest pain. Not dizzy or lightheaded. No fever no chills. No shortness of breath. No nausea no vomiting. ROS:  10 systems reviewed and negative except as noted above. Assessment & Plan:     Principal Problem: This is a 46-year-old male with:    Syncope (2022) secondary to complete heart block:  Cardiology on board. Appreciate recommendations. Temporary pacemaker in place. Plans for permanent pacemaker today. Echocardiogram was repeated this morning with results pending. Amaurosis fugax  CTA head and neck showed no evidence of large vessel occlusion. Scattered nodular opacities in the lungs. Neurology consulted. No interventions as CTA head and neck reassuring.     Coronary artery disease  Cardiology recommends ischemic work-up once permanent pacing device is in place. Hypertension  Continue valsartan/hydrochlorothiazide. Dyslipidemia  Statin    Hypothyroidism  TSH, free T4 within normal limits. Hyponatremia ruled out      Discharge Planning:    Patient to get permanent pacemaker today. Plan to transfer patient to medical floor today. Hopefully discharge home in 24 to 48 hours when cleared by cardiology    Diet:  ADULT DIET Regular; Low Sodium (2 gm)  DVT PPx: Lovenox  Code status: Full Code    Hospital Problems as of 5/2/2022 Never Reviewed          Codes Class Noted - Resolved POA    * (Principal) Syncope ICD-10-CM: R55  ICD-9-CM: 780.2  5/1/2022 - Present Yes        Complete heart block ([address] ICD-10-CM: I44.2  ICD-9-CM: 426.0  5/1/2022 - Present Yes        Amaurosis fugax of right eye ICD-10-CM: G45.3  ICD-9-CM: 362.34  5/1/2022 - Present Yes        RBBB (Chronic) ICD-10-CM: I45.10  ICD-9-CM: 426.4  3/28/2022 - Present Yes        Coronary atherosclerosis of native coronary vessel (Chronic) ICD-10-CM: I25.10  ICD-9-CM: 414.01  1/16/2017 - Present Yes    Overview Addendum 4/6/2022 10:22 AM by Jennifer Hamilton MD     1. Unstable angina (3/29/13):   a. LHC:  LMCA: Normal.  LAD: 20% prox. 20-30% mid. LCx: Normal.  OM3: 20% prox. RCA: 20-30% proximal.  95% distal.  RPL: 30% RPL. EF 65%. Normal wall motion. b.  PCI of distal RCA:   4 X 23 mm Xience RENETTA. 2. [facility] at [facility] 2017. Normal   3. Echo (3/31/2022): EF 60-65%. Normal diastolic function. Mild MR/TR.              Primary hypertension (Chronic) ICD-10-CM: I10  ICD-9-CM: 401.9  3/29/2013 - Present Yes        Dyslipidemia (Chronic) ICD-10-CM: E78.5  ICD-9-CM: 272.4  3/29/2013 - Present Yes              Objective:     Patient Vitals for the past 24 hrs:   Temp Pulse Resp BP SpO2   05/02/22 1245 -- 72 27 (!) 154/78 93 %   05/02/22 1230 -- 64 15 (!) 146/73 96 %   05/02/22 1215 -- 69 16 127/70 96 %   05/02/22 1200 98 °F (36.7 °C) 63 22 126/69 95 %   05/02/22 1145 -- 60 13 127/71 97 %   05/02/22 1130 -- 67 -- 138/78 94 %   05/02/22 1115 -- 68 16 137/71 95 %   05/02/22 1100 -- 64 9 137/73 94 %   05/02/22 1045 -- 66 17 (!) 140/69 96 %   05/02/22 1030 -- 69 15 (!) 144/63 95 %   05/02/22 1015 -- 60 22 118/67 96 %   05/02/22 1000 -- 66 24 138/71 96 %   05/02/22 0945 -- 62 -- (!) 144/68 94 %   05/02/22 0930 -- 60 12 106/63 90 %   05/02/22 0924 -- -- 24 -- --   05/02/22 0915 -- 65 26 132/75 93 %   05/02/22 0900 -- 74 30 110/74 95 %   05/02/22 0845 -- 72 27 119/72 95 %   05/02/22 0830 -- 61 25 132/72 96 %   05/02/22 0815 -- 66 -- 137/76 96 %   05/02/22 0800 -- 67 19 138/76 98 %   05/02/22 0745 -- 72 17 129/73 96 %   05/02/22 0730 97.4 °F (36.3 °C) 68 -- 134/75 94 %   05/02/22 0715 -- 69 25 128/72 96 %   05/02/22 0700 -- 67 23 129/70 95 %   05/02/22 0645 -- 66 18 (!) 144/76 95 %   05/02/22 0630 -- 66 28 135/77 95 %   05/02/22 0615 -- 67 -- 135/81 95 %   05/02/22 0600 -- 64 14 134/77 93 %   05/02/22 0545 -- 64 15 135/77 94 %   05/02/22 0530 -- 64 16 124/74 96 %   05/02/22 0515 -- 63 16 131/76 96 %   05/02/22 0500 -- 67 21 132/76 98 %   05/02/22 0445 -- 60 13 131/64 95 %   05/02/22 0430 -- 61 13 123/67 96 %   05/02/22 0415 -- 60 12 126/62 93 %   05/02/22 0400 -- 71 14 104/77 95 %   05/02/22 0345 -- 60 14 119/68 95 %   05/02/22 0330 -- 60 17 119/68 95 %   05/02/22 0315 -- 60 13 120/71 94 %   05/02/22 0300 98 °F (36.7 °C) 67 20 132/70 98 %   05/02/22 0245 -- 61 17 134/68 96 %   05/02/22 0230 -- 61 12 124/70 94 %   05/02/22 0215 -- 61 12 122/65 97 %   05/02/22 0200 -- 60 13 125/71 94 %   05/02/22 0145 -- 63 14 120/64 96 %   05/02/22 0130 -- 73 21 123/73 94 %   05/02/22 0115 -- 63 13 127/71 96 %   05/02/22 0100 -- 69 24 135/78 96 %   05/02/22 0045 -- 81 -- 132/73 91 %   05/02/22 0030 -- 83 28 (!) 140/86 95 %   05/02/22 0015 -- 71 20 138/80 92 %   05/02/22 0000 -- 77 19 136/80 93 %   05/01/22 2345 -- 79 25 132/80 92 %   05/01/22 2330 -- 79 22 128/78 93 %   05/01/22 2315 98.2 °F (36.8 °C) 84 25 130/78 92 %   05/01/22 2210 -- (!) 32 -- (!) 130/96 --   05/01/22 2209 -- (!) 32 11 136/84 98 %   05/01/22 2154 -- (!) 32 11 (!) 130/96 98 %   05/01/22 2139 -- (!) 33 17 (!) 112/53 98 %   05/01/22 2131 -- (!) 33 15 (!) 118/59 98 %   05/01/22 2116 -- 62 16 (!) 150/70 97 %   05/01/22 2102 -- (!) 34 -- -- --   05/01/22 2101 -- (!) 37 16 135/87 98 %   05/01/22 2046 -- 75 20 139/82 97 %   05/01/22 2031 -- 74 16 138/80 --   05/01/22 2016 -- 76 14 (!) 144/78 96 %   05/01/22 1946 -- 72 16 133/80 94 %   05/01/22 1931 -- 76 17 124/75 95 %   05/01/22 1916 -- 79 14 (!) 130/42 97 %   05/01/22 1816 -- 80 19 125/63 96 %   05/01/22 1801 -- 81 13 (!) 162/97 96 %   05/01/22 1746 -- 79 9 (!) 153/91 97 %   05/01/22 1736 97.7 °F (36.5 °C) 82 18 (!) 145/81 97 %     Oxygen Therapy  O2 Sat (%): 93 % (05/02/22 1245)  Pulse via Oximetry: 74 beats per minute (05/02/22 1245)  O2 Device: None (Room air) (05/02/22 0800)    Estimated body mass index is 30.34 kg/m² as calculated from the following:    Height as of this encounter: 5' 9\" (1.753 m). Weight as of this encounter: 93.2 kg (205 lb 7.5 oz). Intake/Output Summary (Last 24 hours) at 5/2/2022 1340  Last data filed at 5/2/2022 1211  Gross per 24 hour   Intake 682.03 ml   Output 2225 ml   Net -1542.97 ml         Physical Exam:     Blood pressure (!) 154/78, pulse 72, temperature 98 °F (36.7 °C), resp. rate 27, height 5' 9\" (1.753 m), weight 93.2 kg (205 lb 7.5 oz), SpO2 93 %. General:    Elderly male, ill-appearing,   Head:  Normocephalic, atraumatic  Eyes:  Sclerae appear normal.  Pupils equally round. ENT:  Nares appear normal, no drainage. Moist oral mucosa  Neck:  No restricted ROM. Trachea midline   CV:   RRR. No m/r/g. No jugular venous distension. Lungs:   CTAB. No wheezing, rhonchi, or rales. Respirations even, unlabored  Abdomen: Bowel sounds present. Soft, nontender, nondistended. Extremities: No cyanosis or clubbing.   No edema  Skin: No rashes and normal coloration. Warm and dry. Neuro:  CN II-XII grossly intact. Sensation intact. A&Ox3  Psych:  Normal mood and affect. I have reviewed ordered lab tests and independently visualized imaging below:    Recent Labs:  Recent Results (from the past 48 hour(s))   EKG, 12 LEAD, INITIAL    Collection Time: 05/01/22  5:39 PM   Result Value Ref Range    Ventricular Rate 79 BPM    Atrial Rate 80 BPM    P-R Interval 206 ms    QRS Duration 140 ms    Q-T Interval 381 ms    QTC Calculation (Bezet) 437 ms    Calculated P Axis 45 degrees    Calculated R Axis 67 degrees    Calculated T Axis -7 degrees    Diagnosis       Sinus rhythm  Ventricular premature complex  Right bundle branch block  Probable inferolateral infarct, age indeterm     CBC WITH AUTOMATED DIFF    Collection Time: 05/01/22  6:14 PM   Result Value Ref Range    WBC 11.8 (H) 4.3 - 11.1 K/uL    RBC 4.24 4.23 - 5.6 M/uL    HGB 14.7 13.6 - 17.2 g/dL    HCT 38.9 (L) 41.1 - 50.3 %    MCV 91.7 79.6 - 97.8 FL    MCH 34.7 (H) 26.1 - 32.9 PG    MCHC 37.8 (H) 31.4 - 35.0 g/dL    RDW 12.6 11.9 - 14.6 %    PLATELET 628 818 - 158 K/uL    MPV 9.5 9.4 - 12.3 FL    ABSOLUTE NRBC 0.00 0.0 - 0.2 K/uL    DF AUTOMATED      NEUTROPHILS 83 (H) 43 - 78 %    LYMPHOCYTES 11 (L) 13 - 44 %    MONOCYTES 4 4.0 - 12.0 %    EOSINOPHILS 1 0.5 - 7.8 %    BASOPHILS 0 0.0 - 2.0 %    IMMATURE GRANULOCYTES 0 0.0 - 5.0 %    ABS. NEUTROPHILS 9.8 (H) 1.7 - 8.2 K/UL    ABS. LYMPHOCYTES 1.3 0.5 - 4.6 K/UL    ABS. MONOCYTES 0.5 0.1 - 1.3 K/UL    ABS. EOSINOPHILS 0.2 0.0 - 0.8 K/UL    ABS. BASOPHILS 0.0 0.0 - 0.2 K/UL    ABS. IMM.  GRANS. 0.0 0.0 - 0.5 K/UL   METABOLIC PANEL, COMPREHENSIVE    Collection Time: 05/01/22  6:14 PM   Result Value Ref Range    Sodium 135 (L) 136 - 145 mmol/L    Potassium 3.7 3.5 - 5.1 mmol/L    Chloride 99 98 - 107 mmol/L    CO2 28 21 - 32 mmol/L    Anion gap 8 7 - 16 mmol/L    Glucose 116 (H) 65 - 100 mg/dL    BUN 13 8 - 23 MG/DL    Creatinine 1.20 0.8 - 1.5 MG/DL    GFR est AA >60 >60 ml/min/1.73m2    GFR est non-AA >60 >60 ml/min/1.73m2    Calcium 9.2 8.3 - 10.4 MG/DL    Bilirubin, total 1.1 0.2 - 1.1 MG/DL    ALT (SGPT) 71 (H) 12 - 65 U/L    AST (SGOT) 38 (H) 15 - 37 U/L    Alk.  phosphatase 53 50 - 136 U/L    Protein, total 7.8 6.3 - 8.2 g/dL    Albumin 3.8 3.2 - 4.6 g/dL    Globulin 4.0 (H) 2.3 - 3.5 g/dL    A-G Ratio 1.0 (L) 1.2 - 3.5     PROCALCITONIN    Collection Time: 05/01/22  6:14 PM   Result Value Ref Range    Procalcitonin <0.05 0.00 - 0.49 ng/mL   MAGNESIUM    Collection Time: 05/01/22  6:14 PM   Result Value Ref Range    Magnesium 1.8 1.8 - 2.4 mg/dL   TSH 3RD GENERATION    Collection Time: 05/01/22  6:14 PM   Result Value Ref Range    TSH 0.957 0.358 - 3.740 uIU/mL   EKG, 12 LEAD, SUBSEQUENT    Collection Time: 05/01/22  8:56 PM   Result Value Ref Range    Ventricular Rate 32 BPM    Atrial Rate 82 BPM    QRS Duration 150 ms    Q-T Interval 514 ms    QTC Calculation (Bezet) 375 ms    Calculated P Axis 0 degrees    Calculated R Axis 28 degrees    Calculated T Axis 74 degrees    Diagnosis       Complete AV block with wide QRS complex  Right bundle branch block     URINALYSIS W/ RFLX MICROSCOPIC    Collection Time: 05/01/22  9:40 PM   Result Value Ref Range    Color YELLOW      Appearance CLEAR      Specific gravity 1.010 1.001 - 1.023      pH (UA) 7.0 5.0 - 9.0      Protein Negative NEG mg/dL    Glucose Negative NEG mg/dL    Ketone Negative NEG mg/dL    Bilirubin Negative NEG      Blood Negative NEG      Urobilinogen 0.2 0.2 - 1.0 EU/dL    Nitrites Negative NEG      Leukocyte Esterase Negative NEG      Bacteria 0 0 /hpf   DRUG SCREEN, URINE    Collection Time: 05/01/22  9:40 PM   Result Value Ref Range    PCP(PHENCYCLIDINE) Negative      BENZODIAZEPINES Negative      COCAINE Negative      AMPHETAMINES Negative      METHADONE Negative      THC (TH-CANNABINOL) Negative      OPIATES Negative      BARBITURATES Negative     TROPONIN-HIGH SENSITIVITY Collection Time: 05/01/22 11:27 PM   Result Value Ref Range    Troponin-High Sensitivity 148.6 (HH) 0 - 14 pg/mL   TROPONIN-HIGH SENSITIVITY    Collection Time: 05/02/22  1:00 AM   Result Value Ref Range    Troponin-High Sensitivity 187.9 (HH) 0 - 14 pg/mL   LIPID PANEL    Collection Time: 05/02/22  2:40 AM   Result Value Ref Range    Cholesterol, total 129 <200 MG/DL    Triglyceride 193 (H) 35 - 150 MG/DL    HDL Cholesterol 42 40 - 60 MG/DL    LDL, calculated 48.4 <100 MG/DL    VLDL, calculated 38.6 (H) 6.0 - 23.0 MG/DL    CHOL/HDL Ratio 3.1     HEMOGLOBIN A1C WITH EAG    Collection Time: 05/02/22  2:40 AM   Result Value Ref Range    Hemoglobin A1c 5.3 4.20 - 6.30 %    Est. average glucose 105 mg/dL   T4, FREE    Collection Time: 05/02/22  2:40 AM   Result Value Ref Range    T4, Free 1.2 0.9 - 1.8 NG/DL   SED RATE, AUTOMATED    Collection Time: 05/02/22  2:40 AM   Result Value Ref Range    Sed rate, automated 11 0 - 15 mm/hr   MAGNESIUM    Collection Time: 05/02/22  2:40 AM   Result Value Ref Range    Magnesium 2.0 1.8 - 2.4 mg/dL   CK    Collection Time: 05/02/22  2:40 AM   Result Value Ref Range     (H) 21 - 215 U/L   AMMONIA    Collection Time: 05/02/22  2:40 AM   Result Value Ref Range    Ammonia, plasma 30 11 - 32 UMOL/L   CBC WITH AUTOMATED DIFF    Collection Time: 05/02/22  2:40 AM   Result Value Ref Range    WBC 7.6 4.3 - 11.1 K/uL    RBC 4.04 (L) 4.23 - 5.6 M/uL    HGB 13.4 (L) 13.6 - 17.2 g/dL    HCT 36.8 (L) 41.1 - 50.3 %    MCV 91.1 79.6 - 97.8 FL    MCH 33.2 (H) 26.1 - 32.9 PG    MCHC 36.4 (H) 31.4 - 35.0 g/dL    RDW 12.4 11.9 - 14.6 %    PLATELET 143 090 - 519 K/uL    MPV 9.1 (L) 9.4 - 12.3 FL    ABSOLUTE NRBC 0.00 0.0 - 0.2 K/uL    DF AUTOMATED      NEUTROPHILS 66 43 - 78 %    LYMPHOCYTES 25 13 - 44 %    MONOCYTES 7 4.0 - 12.0 %    EOSINOPHILS 2 0.5 - 7.8 %    BASOPHILS 0 0.0 - 2.0 %    IMMATURE GRANULOCYTES 0 0.0 - 5.0 %    ABS. NEUTROPHILS 5.0 1.7 - 8.2 K/UL    ABS.  LYMPHOCYTES 1.9 0.5 - 4.6 K/UL    ABS. MONOCYTES 0.5 0.1 - 1.3 K/UL    ABS. EOSINOPHILS 0.2 0.0 - 0.8 K/UL    ABS. BASOPHILS 0.0 0.0 - 0.2 K/UL    ABS. IMM.  GRANS. 0.0 0.0 - 0.5 K/UL   METABOLIC PANEL, BASIC    Collection Time: 05/02/22  2:40 AM   Result Value Ref Range    Sodium 135 (L) 136 - 145 mmol/L    Potassium 3.9 3.5 - 5.1 mmol/L    Chloride 99 98 - 107 mmol/L    CO2 29 21 - 32 mmol/L    Anion gap 7 7 - 16 mmol/L    Glucose 116 (H) 65 - 100 mg/dL    BUN 14 8 - 23 MG/DL    Creatinine 1.10 0.8 - 1.5 MG/DL    GFR est AA >60 >60 ml/min/1.73m2    GFR est non-AA >60 >60 ml/min/1.73m2    Calcium 9.1 8.3 - 10.4 MG/DL   TROPONIN-HIGH SENSITIVITY    Collection Time: 05/02/22  2:40 AM   Result Value Ref Range    Troponin-High Sensitivity 220.8 (HH) 0 - 14 pg/mL   ECHO ADULT FOLLOW-UP OR LIMITED    Collection Time: 05/02/22 10:50 AM   Result Value Ref Range    LV EDV A2C 113 mL    LV EDV A4C 138 mL    LV ESV A2C 36 mL    LV ESV A4C 47 mL    IVSd 0.9 0.6 - 1.0 cm    LVIDd 4.3 4.2 - 5.9 cm    LVIDs 2.7 cm    LVOT Diameter 2.3 cm    LVPWd 0.9 0.6 - 1.0 cm    LV E' Lateral Velocity 13 cm/s    LV E' Septal Velocity 8 cm/s    LV Ejection Fraction A2C 68 %    LV Ejection Fraction A4C 66 %    EF BP 68 55 - 100 %    LVOT Area 4.2 cm2    Aortic Root 3.2 cm    IVC Proxmal 1.5 cm    LA Diameter 3.9 cm    MV E Wave Deceleration Time 214.0 ms    MV A Velocity 0.58 m/s    MV E Velocity 0.77 m/s    Est. RA Pressure 3 mmHg    RVIDd 3.6 cm    Fractional Shortening 2D 37 28 - 44 %    LV ESV Index A4C 22 mL/m2    LV EDV Index A4C 66 mL/m2    LV ESV Index A2C 17 mL/m2    LV EDV Index A2C 54 mL/m2    LVIDd Index 2.06 cm/m2    LVIDs Index 1.29 cm/m2    LV RWT Ratio 0.42     LV Mass 2D 123.3 88 - 224 g    LV Mass 2D Index 59.0 49 - 115 g/m2    MV E/A 1.33     E/E' Ratio (Averaged) 7.77     E/E' Lateral 5.92     E/E' Septal 9.63     LA Size Index 1.87 cm/m2    LA/AO Root Ratio 1.22     Ao Root Index 1.53 cm/m2       All Micro Results     None          Other Studies:  CT HEAD WO CONT    Result Date: 5/1/2022  EXAMINATION: HEAD CT WITHOUT CONTRAST 5/1/2022 6:35 PM ACCESSION NUMBER: [id number] INDICATION: Syncope, transient loss of right eye vision COMPARISON: None available TECHNIQUE: Multiple-row detector helical CT examination of the head without intravenous contrast. Radiation dose reduction techniques were used for this study:  Our CT scanners use one or all of the following: Automated exposure control, adjustment of the mA and/or kVp according to patient's size, iterative reconstruction. FINDINGS: No evidence of intracranial mass, hemorrhage, or large territorial infarct. The ventricles are normal in size and position. The basal cisterns are patent. No extra-axial fluid collection or mass effect. The orbital contents are within normal limits. The paranasal sinuses are clear. The mastoid air cells and middle ears are clear. No significant osseous or extracranial soft tissue lesions. No acute intracranial abnormality. XR CHEST PORT    Result Date: 5/1/2022  EXAM: XR CHEST PORT INDICATION: Syncope COMPARISON: Chest radiograph, 3/29/2013 FINDINGS: The cardiomediastinal silhouette is within normal limits. Atherosclerotic calcifications in the aorta. No focal parenchymal process. No pleural effusion. No pneumothorax. No acute osseous abnormality. Atherosclerosis without acute cardiopulmonary abnormality. CTA CODE NEURO HEAD AND NECK W CONT    Result Date: 5/2/2022  Title:  CT arteriogram of the neck and head. Indication: Transient loss of vision in the right eye. Technique: Axial images of the neck and head were obtained after the uneventful administration of intravenous iodinated contrast media. Contrast was used to best identify the arterial structures.   Images were reviewed on a separate, free standing, three-dimensional workstation as per the referring physicians request.  All stenosis percentages derived by comparing the narrowest segment with the distal Internal Carotid Artery luminal diameter, as described in the [place] [employer] Symptomatic Carotid Endarterectomy Trial (NASCET) criteria. All CT scans at this facility are performed using dose reduction/dose modulation techniques, as appropriate the performed exam, including the following: Automated Exposure Control; Adjustment of the mA and/or kV according to patient size (this includes techniques or standardized protocols for targeted exams where dose is matched to indication/reason for exam); and Use of Iterative Reconstruction Technique. Comparison: None. Findings: Lungs: Interstitial and nodular opacities of the upper lungs, possibly edema versus an atypical infection Soft Tissues: Some mucosal thickening primarily in the left maxillary sinus Cervical Spine: Degenerative changes Aorta: Arch. Mild atherosclerosis Great Vessels: Patent Right ICA: Patent % Stenosis: 0 Right MCA: Patent Right BARRETT: Patent Left ICA: Minimal plaque % Stenosis: 0 Left MCA: Patent Left BARRETT: Patent Right Vertebral: Patent Left Vertebral: Patent Dominance: Codominant Basilar: Patent Right PCA: Patent. Fetal origin Left PCA: Patent. Prominent posterior communicating artery Other Vascular: Negative     No evidence of large vessel occlusion. Scattered nodular opacities in the lungs, possibly an atypical infection     CARDIAC PROCEDURE    Result Date: 5/1/2022  · Temporary pacemaker implantation via right femoral vein.         Current Meds:  Current Facility-Administered Medications   Medication Dose Route Frequency    sterile water irrigation 1,000 mL with neomycin-polymyxin B  1 mL irrigation solution   Irrigation ONCE    sodium chloride (NS) flush 5-10 mL  5-10 mL IntraVENous PRN    sodium chloride (NS) flush 5-40 mL  5-40 mL IntraVENous Q8H    acetaminophen (TYLENOL) tablet 650 mg  650 mg Oral Q6H PRN    Or    acetaminophen (TYLENOL) suppository 650 mg  650 mg Rectal Q6H PRN    polyethylene glycol (MIRALAX) packet 17 g  17 g Oral DAILY PRN  ondansetron (ZOFRAN ODT) tablet 4 mg  4 mg Oral Q8H PRN    Or    ondansetron (ZOFRAN) injection 4 mg  4 mg IntraVENous Q6H PRN    enoxaparin (LOVENOX) injection 40 mg  40 mg SubCUTAneous DAILY    aspirin delayed-release tablet 81 mg  81 mg Oral DAILY    levothyroxine (SYNTHROID) tablet 100 mcg  100 mcg Oral ACB    [Held by provider] valsartan (DIOVAN) 320 mg, hydroCHLOROthiazide (HYDRODIURIL) 25 mg   Oral DAILY    pantoprazole (PROTONIX) tablet 40 mg  40 mg Oral ACB    allopurinoL (ZYLOPRIM) tablet 300 mg  300 mg Oral DAILY    lactated Ringers infusion  75 mL/hr IntraVENous CONTINUOUS    sodium chloride (NS) flush 5-40 mL  5-40 mL IntraVENous PRN    DOPamine (INTROPIN) 800 mg in dextrose 5% 250 mL infusion  0-20 mcg/kg/min IntraVENous TITRATE       Signed:  Luis Sheldon MD    Part of this note may have been written by using a voice dictation software. The note has been proof read but may still contain some grammatical/other typographical errors.

## 2022-05-02 NOTE — PROGRESS NOTES
Problem: Patient Education: Go to Patient Education Activity  Goal: Patient/Family Education  Outcome: Progressing Towards Goal     Problem: Pacer/ICD: Post-Procedure  Goal: Activity/Safety  Outcome: Progressing Towards Goal     Problem: Pacer/ICD: Post-Procedure  Goal: Diagnostic Test/Procedures  Outcome: Progressing Towards Goal

## 2022-05-02 NOTE — ED NOTES
Patient's wife came out of room around 2058 for help. Patient having an episode of shaking and being unresponsive but maintaining airway. Pt's pupils were dilated during this episode. Pt then came around and his heart rate was in the 30's. Repeat EKG obtained and taken to MD. Verbal order for 0.5mg atropine to administer IV stat. Set patient up on pacer pads, gave atropine to patient. Patient alert and oriented X 4 at that time with no complaints.

## 2022-05-02 NOTE — PROGRESS NOTES
Bedside report received from Osito Duffy, [facility]. Patient assessed, VSS, denies any pain or shortness of breath at this time. R groin sheath site benign with temporary pacemaker in place. Patient intermittently NSR and V paced.

## 2022-05-02 NOTE — ED NOTES
TRANSFER - OUT REPORT:    Verbal report given to Shreya Goodrich on [address]  being transferred to [facility] 932 for routine progression of care       Report consisted of patients Situation, Background, Assessment and   Recommendations(SBAR). Information from the following report(s) SBAR and ED Summary was reviewed with the receiving nurse. Lines:   Peripheral IV 05/01/22 Left Wrist (Active)   Site Assessment Clean, dry, & intact 05/01/22 1819   Phlebitis Assessment 0 05/01/22 1819   Infiltration Assessment 0 05/01/22 1819   Dressing Status Clean, dry, & intact 05/01/22 1819        Opportunity for questions and clarification was provided.       Patient transported with:   Monitor  O2 @ 2 liters  Registered Nurse

## 2022-05-02 NOTE — PROGRESS NOTES
Per ED MD patient went into third-degree AV block. Continue with telemetry monitoring. Obtain EKG. Avoid beta-blockers and calcium channel blockers. Cardiology consultation. Will admit to ICU. Addendum:  At bedside patient with third-degree AV block. He received 0.5mg of atropine IV. Currently heart rate in the 30s. External pads in place. Cardiology at bedside. To be taken for transvenous pacemaker tonight.

## 2022-05-02 NOTE — PROGRESS NOTES
TRANSFER - IN REPORT:    Verbal report received from [facility]) on [place]  being received from ED(unit) for urgent transfer      Report consisted of patients Situation, Background, Assessment and   Recommendations(SBAR). Information from the following report(s) SBAR, Kardex and ED Summary was reviewed with the receiving nurse. Opportunity for questions and clarification was provided. Assessment completed upon patients arrival to unit and care assumed.

## 2022-05-02 NOTE — PROGRESS NOTES
Multi-D Rounds/Checklist:  A: Is pain control adequate?  none   B: Sedation break and SBT? NA  C: Is sedation choice appropriate? NA  D: Delirium/CAM-ICU? NA  E: Mobility goals/appropriateness? has temp pacer in place on bedrest for now  F: Family update and plan? yes - patient able to communicate by phone  Vent: HOB elevated?  yes             CC/Kg?: NA             Vent Day?: NA  Lines: can any be removed?: + PIV  DVT Prophylaxis: lovenox  Nutrition Ordered/appropriate: NPO for procedure  Can antibiotics or other drugs be stopped?: none  Consults needed: none

## 2022-05-02 NOTE — ED NOTES
Per cardiology PA, patient will be going to cath lab. Will keep patient in the ER until cath lab team arrives. Deandra Cagle RN in ICU to notify her of the change in plans.

## 2022-05-02 NOTE — PROGRESS NOTES
Occupational Therapy Note:    OT orders received and chart reviewed. Evaluation attempted however pt currently with temp pacer. Going for permanent pacer later today. Will check back later as patient's condition permits.     Daphnie Martins OTR/JANNIE, CLT

## 2022-05-02 NOTE — PROGRESS NOTES
Bedside, Verbal and Written shift change report given to [facility] AT [facility], [address] (oncoming nurse) by Zaid Ortega RN and Marietta Weeks RN (offgoing nurse). Report included the following information SBAR, Kardex, ED Summary, Procedure Summary, Intake/Output, MAR, Accordion, Recent Results, Med Rec Status, Cardiac Rhythm NSR/ V paced  and Alarm Parameters . Pt A& O x4, family at bedside. Leg immobilizer removed and pt permitted to sit up. Bed rest to be continued until 9pm. Pt currently in NSR. R Groin site stoft, clean dry and intact. L Shoulder site clean dry and intact, sling in place. LR gtt running at 76.

## 2022-05-03 VITALS
WEIGHT: 204.2 LBS | SYSTOLIC BLOOD PRESSURE: 156 MMHG | RESPIRATION RATE: 18 BRPM | BODY MASS INDEX: 30.24 KG/M2 | OXYGEN SATURATION: 94 % | HEART RATE: 67 BPM | TEMPERATURE: 98 F | HEIGHT: 69 IN | DIASTOLIC BLOOD PRESSURE: 91 MMHG

## 2022-05-03 LAB
ANION GAP SERPL CALC-SCNC: 7 MMOL/L (ref 7–16)
BASOPHILS # BLD: 0 K/UL (ref 0–0.2)
BASOPHILS NFR BLD: 1 % (ref 0–2)
BUN SERPL-MCNC: 13 MG/DL (ref 8–23)
CALCIUM SERPL-MCNC: 8.7 MG/DL (ref 8.3–10.4)
CHLORIDE SERPL-SCNC: 101 MMOL/L (ref 98–107)
CO2 SERPL-SCNC: 29 MMOL/L (ref 21–32)
CREAT SERPL-MCNC: 1 MG/DL (ref 0.8–1.5)
DIFFERENTIAL METHOD BLD: ABNORMAL
EOSINOPHIL # BLD: 0.5 K/UL (ref 0–0.8)
EOSINOPHIL NFR BLD: 7 % (ref 0.5–7.8)
ERYTHROCYTE [DISTWIDTH] IN BLOOD BY AUTOMATED COUNT: 12.5 % (ref 11.9–14.6)
GLUCOSE SERPL-MCNC: 103 MG/DL (ref 65–100)
HCT VFR BLD AUTO: 36.1 % (ref 41.1–50.3)
HGB BLD-MCNC: 12.9 G/DL (ref 13.6–17.2)
IMM GRANULOCYTES # BLD AUTO: 0 K/UL (ref 0–0.5)
IMM GRANULOCYTES NFR BLD AUTO: 0 % (ref 0–5)
LYMPHOCYTES # BLD: 1.8 K/UL (ref 0.5–4.6)
LYMPHOCYTES NFR BLD: 24 % (ref 13–44)
MCH RBC QN AUTO: 33.5 PG (ref 26.1–32.9)
MCHC RBC AUTO-ENTMCNC: 35.7 G/DL (ref 31.4–35)
MCV RBC AUTO: 93.8 FL (ref 79.6–97.8)
MONOCYTES # BLD: 0.7 K/UL (ref 0.1–1.3)
MONOCYTES NFR BLD: 9 % (ref 4–12)
NEUTS SEG # BLD: 4.5 K/UL (ref 1.7–8.2)
NEUTS SEG NFR BLD: 59 % (ref 43–78)
NRBC # BLD: 0 K/UL (ref 0–0.2)
PLATELET # BLD AUTO: 150 K/UL (ref 150–450)
PMV BLD AUTO: 9.6 FL (ref 9.4–12.3)
POTASSIUM SERPL-SCNC: 3.5 MMOL/L (ref 3.5–5.1)
RBC # BLD AUTO: 3.85 M/UL (ref 4.23–5.6)
SODIUM SERPL-SCNC: 137 MMOL/L (ref 138–145)
WBC # BLD AUTO: 7.5 K/UL (ref 4.3–11.1)

## 2022-05-03 PROCEDURE — 74011250637 HC RX REV CODE- 250/637: Performed by: INTERNAL MEDICINE

## 2022-05-03 PROCEDURE — 85025 COMPLETE CBC W/AUTO DIFF WBC: CPT

## 2022-05-03 PROCEDURE — 97161 PT EVAL LOW COMPLEX 20 MIN: CPT

## 2022-05-03 PROCEDURE — 74011000250 HC RX REV CODE- 250: Performed by: INTERNAL MEDICINE

## 2022-05-03 PROCEDURE — 97530 THERAPEUTIC ACTIVITIES: CPT

## 2022-05-03 PROCEDURE — 80048 BASIC METABOLIC PNL TOTAL CA: CPT

## 2022-05-03 PROCEDURE — 74011250636 HC RX REV CODE- 250/636: Performed by: INTERNAL MEDICINE

## 2022-05-03 PROCEDURE — 36415 COLL VENOUS BLD VENIPUNCTURE: CPT

## 2022-05-03 PROCEDURE — 97165 OT EVAL LOW COMPLEX 30 MIN: CPT

## 2022-05-03 PROCEDURE — 97535 SELF CARE MNGMENT TRAINING: CPT

## 2022-05-03 RX ADMIN — SODIUM CHLORIDE, POTASSIUM CHLORIDE, SODIUM LACTATE AND CALCIUM CHLORIDE 75 ML/HR: 600; 310; 30; 20 INJECTION, SOLUTION INTRAVENOUS at 04:34

## 2022-05-03 RX ADMIN — ACETAMINOPHEN 650 MG: 325 TABLET ORAL at 05:24

## 2022-05-03 RX ADMIN — ASPIRIN 81 MG: 81 TABLET ORAL at 08:36

## 2022-05-03 RX ADMIN — PANTOPRAZOLE SODIUM 40 MG: 40 TABLET, DELAYED RELEASE ORAL at 05:24

## 2022-05-03 RX ADMIN — ALLOPURINOL 300 MG: 300 TABLET ORAL at 08:36

## 2022-05-03 RX ADMIN — LEVOTHYROXINE SODIUM 100 MCG: 0.1 TABLET ORAL at 05:24

## 2022-05-03 RX ADMIN — CEFAZOLIN 2 G: 10 INJECTION, POWDER, FOR SOLUTION INTRAVENOUS at 05:27

## 2022-05-03 RX ADMIN — SODIUM CHLORIDE, PRESERVATIVE FREE 5 ML: 5 INJECTION INTRAVENOUS at 05:34

## 2022-05-03 RX ADMIN — SODIUM CHLORIDE, PRESERVATIVE FREE 5 ML: 5 INJECTION INTRAVENOUS at 05:28

## 2022-05-03 NOTE — DISCHARGE INSTRUCTIONS
Patient Education        Biventricular Pacemaker Placement: What to Expect at [facility] pacemaker placement is surgery to put a biventricular pacemaker in your chest. Your doctor made a cut (incision) just below your collarbone. The doctor put the pacemaker leads through the cut, into a large blood vessel, then into the heart. The doctor put the pacemaker under the skin of your chest and attached the leads to it. Your chest may be sore where the doctor made the cut. You also may have a bruise and mild swelling. These symptoms usually get better in 1 to 2 weeks. You may feel a hard ridge along the incision. This usually gets softer in the months after surgery. You may be able to see or feel the outline of the pacemaker under your skin. You will probably be able to go back to work or your usual routine 1 to 2 weeks after surgery. Pacemaker batteries usually last 5 to 15 years. Your doctor will talk to you about how often you will need to have your pacemaker checked. You'll need to take steps to safely use electric devices. Some of these devices can stop your pacemaker from working right for a short time. Check with your doctor about what to avoid and what to keep a short distance away from your pacemaker. For example, you will need to stay away from things with strong magnetic and electrical fields. An example is an MRI machine (unless your pacemaker is safe for an MRI). You can use a cellphone and other wireless devices, but keep them at least 6 inches away from your pacemaker. Many household and office electronics don't affect a pacemaker. These include kitchen appliances and computers. This care sheet gives you a general idea about how long it will take for you to recover. But each person recovers at a different pace. Follow the steps below to get better as quickly as possible. How can you care for yourself at home? Activity    · Rest when you feel tired.     · Be active.  Walking is a good choice.     · For 4 to 6 weeks:  ? Avoid activities that strain your chest or upper arm muscles. This includes pushing a  or vacuum, mopping floors, swimming, or swinging a golf club or tennis racquet. ? Do not raise your arm (the one on the side of your body where the pacemaker is located) above your shoulder. ? Allow your body to heal. Don't move quickly or lift anything heavy until you are feeling better.     · Many people are able to return to work within 1 to 2 weeks after surgery.     · Ask your doctor when it is okay for you to have sex. Diet    · You can eat your normal diet. If your stomach is upset, try bland, low-fat foods like plain rice, broiled chicken, toast, and yogurt. Medicines    · Your doctor will tell you if and when you can restart your medicines. You will also get instructions about taking any new medicines.     · If you take aspirin or some other blood thinner, ask your doctor if and when to start taking it again. Make sure that you understand exactly what your doctor wants you to do.     · Be safe with medicines. Read and follow all instructions on the label. ? If the doctor gave you a prescription medicine for pain, take it as prescribed. ? If you are not taking a prescription pain medicine, ask your doctor if you can take an over-the-counter medicine. ? Do not take aspirin, ibuprofen (Advil, Motrin), naproxen (Aleve), or other nonsteroidal anti-inflammatory drugs (NSAIDs) unless your doctor says it is okay.     · If your doctor prescribed antibiotics, take them as directed. Do not stop taking them just because you feel better. You need to take the full course of antibiotics. Incision care    · If you have strips of tape on the incision, leave the tape on for a week or until it falls off.     · Keep the incision dry while it heals. Your doctor may recommend sponge baths for about 7 days, but do not get the incision wet.  Your doctor will let you know when you may take showers. After a shower, pat the incision dry.     · Don't use hydrogen peroxide or alcohol on the incision, which can slow healing. You may cover the area with a gauze bandage if it oozes fluid or rubs against clothing. Change the bandage every day.     · Do not take a bath or get into a hot tub for the first 2 weeks, or until your doctor tells you it is okay. Other instructions    · Keep a medical ID card with you at all times that says you have a pacemaker. The card should include the  and model information.     · Wear medical alert jewelry stating that you have a pacemaker. You can buy this at most [employer].     · Check your pulse as directed by your doctor.     · Have your pacemaker checked as often as your doctor recommends. In some cases, this may be done over the phone or online. Your doctor will give you instructions about how to do this. Follow-up care is a key part of your treatment and safety. Be sure to make and go to all appointments, and call your doctor if you are having problems. It's also a good idea to know your test results and keep a list of the medicines you take. When should you call for help? Call 911 anytime you think you may need emergency care. For example, call if:    · You passed out (lost consciousness).     · You have trouble breathing. Call your doctor now or seek immediate medical care if:    · You are dizzy or light-headed, or you feel like you may faint.     · You have pain that does not get better after you take pain medicine.     · You hear an alarm or feel a vibration from your pacemaker.     · You have loose stitches, or your incision comes open.     · Bright red blood has soaked through the bandage over your incision.     · You have signs of infection, such as:  ? Increased pain, swelling, warmth, or redness. ? Red streaks leading from the incision. ? Pus draining from the incision. ? A fever.    Watch closely for changes in your health, and be sure to contact your doctor if:    · You have any problems with your pacemaker. Where can you learn more? Go to http://www.gray.com/  Enter T984 in the search box to learn more about \"Biventricular Pacemaker Placement: What to Expect at Home. \"  Current as of: July 28, 2021               Content Version: 13.2  © 2006-2022 [employer]. Care instructions adapted under license by [employer] (which disclaims liability or warranty for this information). If you have questions about a medical condition or this instruction, always ask your healthcare professional. [place] [address] any warranty or liability for your use of this information. Stroke: After Your Visit     Your Care Instructions     You have had a stroke. Risk factors for stroke include being overweight, smoking, and sedentary lifestyle. This means that the blood flow to a part of your brain was blocked for some time, which damages the nerve cells in that part of the brain. The part of your body controlled by that part of your brain may not function properly now. The brain is an amazing organ that can heal itself to some degree. The stroke you had damaged part of your brain, but other parts of your brain may take over in some way for the damaged areas. You have already started this process. Going home may be hard for you and your family. The more you can try to do for yourself, the better. Remember to take each day one at a time. Follow-up care is a key part of your treatment and safety. Be sure to make and go to all appointments, and call your doctor if you are having problems. Its also a good idea to know your test results and keep a list of the medicines you take. How can you care for yourself at home? Enter a stroke rehabilitation (rehab) program, if your doctor recommends it.  Physical, speech, and occupational therapies can help you manage bathing, dressing, eating, and other basics of daily living. Eat a heart-healthy diet that is low in cholesterol, saturated fat, and salt. Eat lots of fresh fruits and vegetables and foods high in fiber. Increase your activities slowly. Take short rest breaks when you get tired. Gradually increase the amount you walk. Start out by walking a little more than you did the day before. Do not drive until your doctor says it is okay. It is normal to feel sad or depressed after a stroke. If the blues last, talk to your doctor. If you are having problems with urine leakage, go to the bathroom at regular times, including when you first wake up and at bedtime. Also, limit fluids after dinner. If you are constipated, drink plenty of fluids, enough so that your urine is light yellow or clear like water. If you have kidney, heart, or liver disease and have to limit fluids, talk with your doctor before you increase the amount of fluids you drink. Set up a regular time for using the toilet. If you continue to have constipation, your doctor may suggest using a bulking agent, such as Metamucil, or a stool softener, laxative, or enema. Medicines  Take your medicines exactly as prescribed. Call your doctor if you think you are having a problem with your medicine. You may be taking several medicines. ACE (angiotensin-converting enzyme) inhibitors, angiotensin II receptor blockers (ARBs), beta-blockers, diuretics (water pills), and calcium channel blockers control your blood pressure. Statins help lower cholesterol. Your doctor may also prescribe medicines for depression, pain, sleep problems, anxiety, or agitation. If your doctor has given you medicine that prevents blood clots, such as warfarin (Coumadin), aspirin combined with extended-release dipyridamole (Aggrenox), clopidogrel (Plavix), or aspirin to prevent another stroke, you should:  Tell your dentist, pharmacist, and other health professionals that you take these medicines.   Watch for unusual bruising or bleeding, such as blood in your urine, red or black stools, or bleeding from your nose or gums. Get regular blood tests to check your clotting time if you are taking Coumadin. Wear medical alert jewelry that says you take blood thinners. You can buy this at most drugstores. Do not take any over-the-counter medicines or herbal products without talking to your doctor first.  If you take birth control pills or hormone replacement therapy, talk to your doctor about whether they are right for you. For family members and caregivers  Make the home safe. Set up a room so that your loved one does not have to climb stairs. Be sure the bathroom is on the same floor. Move throw rugs and furniture that could cause falls, and make sure that the lighting is good. Put grab bars and seats in tubs and showers. Find out what your loved one can do and what he or she needs help with. Try not to do things for your loved one that your loved one can do on his or her own. Help him or her learn and practice new skills. Visit and talk with your loved one often. Try doing activities together that you both enjoy, such as playing cards or board games. Keep in touch with your loved one's friends as much as you can, and encourage them to visit. Take care of yourself. Do not try to do everything yourself. Ask other family members to help. Eat well, get enough rest, and take time to do things that you enjoy. Keep up with your own doctor visits, and make sure to take your medicines regularly. Get out of the house as much as you can. Join a local support group. Find out if you qualify for home health care visits to help with rehab or for adult day care. When should you call for help? Call 911 anytime you think you may need emergency care. For example, call if:  You have signs of another stroke.  These may include:  Sudden numbness, paralysis, or weakness in your face, arm, or leg, especially on only one side of your body. New problems with walking or balance. Sudden vision changes. Drooling or slurred speech. New problems speaking or understanding simple statements, or you feel confused. A sudden, severe headache that is different from past headaches. Call 911 even if these symptoms go away in a few minutes. You cough up blood. You vomit blood or what looks like coffee grounds. You pass maroon or very bloody stools. Call your doctor now or seek immediate medical care if:  You have new bruises or blood spots under your skin. You have a nosebleed. Your gums bleed when you brush your teeth. You have blood in your urine. Your stools are black and tarlike or have streaks of blood. You have vaginal bleeding when you are not having your period, or heavy period bleeding. You have new symptoms that may be related to your stroke, such as falls or trouble swallowing. Watch closely for changes in your health, and be sure to contact your doctor if you have any problems. Where can you learn more? Go to [email or web address]    Enter C294  in the search box to learn more about \"Stroke: After Your Visit\". © 1789-8500 [employer]. Care instructions adapted under license by [facility] [employer] (which disclaims liability or warranty for this information). This care instruction is for use with your licensed healthcare professional. If you have questions about a medical condition or this instruction, always ask your healthcare professional. [employer] any warranty or liability for your use of this information.

## 2022-05-03 NOTE — PROGRESS NOTES
TRANSFER - OUT REPORT:    Verbal report given to Christian, RN (name) on Eneida Bowen  being transferred to Tele(unit) for routine progression of care       Report consisted of patients Situation, Background, Assessment and   Recommendations(SBAR). Information from the following report(s) SBAR, Kardex, ED Summary, OR Summary, Procedure Summary, Intake/Output, MAR, Accordion, Recent Results, Med Rec Status, Cardiac Rhythm NSR/ V paced and Alarm Parameters  was reviewed with the receiving nurse. Opportunity for questions and clarification was provided.       Patient transported with:   Monitor  Registered Nurse   Pt belongings bags

## 2022-05-03 NOTE — PROGRESS NOTES
SPEECH PATHOLOGY NOTE:    Speech therapy attempt this AM.   Patient now s/p pacemaker. Neuro workup negative for acute findings. He denies any changes to swallow function, speech, language, or cognitive-linguistic function. Regular diet now ordered and being tolerated without difficulty per his report. He is communicating at the conversational level with 100% intelligibility and appropriate language abilities. Good recall of recent events including conversations with physicians. Plan to discharge home later today. He politely declined speech therapy evaluation due to lack of symptoms and no findings on MRI/CT. Encouraged patient to notify PCP if any changes are noted upon discharge home. He verbalized understanding of recommendation. Please consider re-consult if new concerns arise.      CONSTANTIN Kilgore, CCC-SLP  Speech Language Pathologist  Acute Rehabilitation Services  Contact: Dustin

## 2022-05-03 NOTE — PROGRESS NOTES
Critical Care Outreach Nurse Progress Report:    Subjective: In to assess pt secondary to transfer from ICU. MEWS Score: 2 (05/02/22 2352)    Vitals:    05/02/22 2304 05/02/22 2319 05/02/22 2333 05/02/22 2352   BP:  (!) 172/79 (!) 146/75 (!) 156/88   Pulse:  76 78 73   Resp: 20 23 26 24   Temp:    98 °F (36.7 °C)   SpO2:  96% 95% 94%   Weight:    92.6 kg (204 lb 1.6 oz)   Height:            Assessment: Pt is A&O x4 and appears to be in NAD at this time. O2 sat 94% on RA. Pt denies any pain and voices no complaints. NSR on tele monitor. L shoulder sling in place. Plan: Will continue to follow per outreach program protocol. Lindsay Leyva RN.

## 2022-05-03 NOTE — PROGRESS NOTES
ACUTE OT GOALS:  (Developed with and agreed upon by patient and/or caregiver.)  1. Patient will complete self-grooming tasks while standing EOS with supervision. 2 Patient will complete functional transfers with supervision and adaptive equipment as needed. 3. Patient will complete functional mobility for household distances with supervision. 4. Patient will complete lower body dressing with set-up. OCCUPATIONAL THERAPY ASSESSMENT: Initial Assessment, Daily Note and Discharge OT Treatment Day # 1    Casandra Gallo is a 61 y.o. male   PRIMARY DIAGNOSIS: Syncope  Syncope [R55]  Procedure(s) (LRB):  INSERT PPM DUAL (N/A)  1 Day Post-Op  Reason for Referral:   ICD-10: Treatment Diagnosis: Generalized Muscle Weakness (M62.81)  INPATIENT: Payor: [employer] / Plan: Bob Grimm / Product Type: Commerical /   ASSESSMENT:     REHAB RECOMMENDATIONS:   Recommendation to date pending progress:  Setting:   No further skilled therapy after discharge from hospital  Equipment:    None     PRIOR LEVEL OF FUNCTION:  (Prior to Hospitalization)  INITIAL/CURRENT LEVEL OF FUNCTION:  (Based on today's evaluation)   Bathing:   Independent  Dressing:   Independent  Feeding/Grooming:   Independent  Toileting:   Independent  Functional Mobility:   Independent Bathing:   Supervision  Dressing:   Supervision  Feeding/Grooming:   Set Up  Toileting:   Supervision  Functional Mobility:   Supervision     ASSESSMENT:  Mr. Jonathan Murphy presents with deficits in overall strength, activity tolerance, ADL performance and functional mobility. S/p dual PPM, 1 day post op. Endorses mild soreness but tolerating well. Discussed pacemaker precautions, specifically related to home use; pt verbalizing and demonstrating understanding. Supervision for functional bed mobility/transfers. SBA for self-grooming tasks while standing edge of sink. Set-up for donning socks.   At this time, Casandra Gallo is functioning below baseline for ADLs and functional mobility. Pt would benefit from skilled OT services to address OT goals and and plan of care. .     SUBJECTIVE:   Mr. Lauren Joshua states, \"I'm from [facility]. \"    SOCIAL HISTORY/LIVING ENVIRONMENT: Lives with wife in [place] home. Independent at baseline for ADLs and functional mobility.    Home Environment: Independent living  One/Two Story Residence: One story  Living Alone: No  Support Systems: Spouse/Significant Other    OBJECTIVE:     PAIN: VITAL SIGNS: LINES/DRAINS:   Pre Treatment: Pain Screen  Pain Scale 1: Numeric (0 - 10)  Pain Intensity 1: 0  Post Treatment: 0   None  O2 Device: None (Room air)     GROSS EVALUATION:  BUEs Within Functional Limits Abnormal/ Functional Abnormal/ Non-Functional (see comments) Not Tested Comments:   AROM [x] [] [] []    PROM [x] [] [] []    Strength [x] [] [] []    Balance [x] [] [] []    Posture [x] [] [] []    Sensation [x] [] [] []    Coordination [x] [] [] []    Tone [x] [] [] []    Edema [x] [] [] []    Activity Tolerance [x] [] [] []     [] [] [] []      COGNITION/  PERCEPTION: Intact Impaired   (see comments) Comments:   Orientation [x] []    Vision [x] []    Hearing [x] []    Judgment/ Insight [x] []    Attention [x] []    Memory [x] []    Command Following [x] []    Emotional Regulation [x] []     [] []      ACTIVITIES OF DAILY LIVING: I Mod I S SBA CGA Min Mod Max Total NT Comments   BASIC ADLs:              Bathing/ Showering [] [] [] [] [] [] [] [] [] [x]    Toileting [] [] [] [] [] [] [] [] [] [x]    Dressing [] [] [x] [] [] [] [] [] [] [] socks   Feeding [] [] [] [] [] [] [] [] [] [x]    Grooming [] [] [x] [] [] [] [] [] [] [] EOS   Personal Device Care [] [] [] [] [] [] [] [] [] []    Functional Mobility [] [] [x] [] [] [] [] [] [] []    I=Independent, Mod I=Modified Independent, S=Supervision, SBA=Standby Assistance, CGA=Contact Guard Assistance,   Min=Minimal Assistance, Mod=Moderate Assistance, Max=Maximal Assistance, Total=Total Assistance, NT=Not Tested    MOBILITY: I Mod I S SBA CGA Min Mod Max Total  NT x2 Comments:   Supine to sit [] [] [x] [] [] [] [] [] [] [] []    Sit to supine [] [] [] [] [] [] [] [] [] [x] []    Sit to stand [] [] [x] [] [] [] [] [] [] [] []    Bed to chair [] [] [x] [] [] [] [] [] [] [] []    I=Independent, Mod I=Modified Independent, S=Supervision, SBA=Standby Assistance, CGA=Contact Guard Assistance,   Min=Minimal Assistance, Mod=Moderate Assistance, Max=Maximal Assistance, Total=Total Assistance, NT=Not Tested    [address] [place] [address] AM-PAC 6 Clicks   Daily Activity Inpatient Short Form        How much help from another person does the patient currently need. .. Total A Lot A Little None   1. Putting on and taking off regular lower body clothing? [] 1   [] 2   [x] 3   [] 4   2. Bathing (including washing, rinsing, drying)? [] 1   [] 2   [x] 3   [] 4   3. Toileting, which includes using toilet, bedpan or urinal?   [] 1   [] 2   [x] 3   [] 4   4. Putting on and taking off regular upper body clothing? [] 1   [] 2   [] 3   [x] 4   5. Taking care of personal grooming such as brushing teeth? [] 1   [] 2   [] 3   [x] 4   6. Eating meals? [] 1   [] 2   [] 3   [x] 4   © 2007, Trustees of [address] [place] [address], under license to [employer]. All rights reserved     Score:  Initial: 21 Most Recent: X (Date: -- )   Interpretation of Tool:  Represents activities that are increasingly more difficult (i.e. Bed mobility, Transfers, Gait). PLAN:   FREQUENCY/DURATION: discharge    PROBLEM LIST:   (Skilled intervention is medically necessary to address:)  1. discharge   INTERVENTIONS PLANNED:   (Benefits and precautions of occupational therapy have been discussed with the patient.)  1.  discharge     TREATMENT:     EVALUATION: Low Complexity : (Untimed Charge)    TREATMENT:   ($$ Self Care/Home Management: 8-22 mins    )  Self Care (10 Minutes): Self care including Lower Body Dressing, Grooming and Energy Conservation Training to increase independence and decrease level of assistance required.     TREATMENT GRID:  N/A    AFTER TREATMENT POSITION/PRECAUTIONS:  Chair, Needs within reach and RN notified    INTERDISCIPLINARY COLLABORATION:  RN/PCT, PT/PTA and OT/MASON    TOTAL TREATMENT DURATION:  OT Patient Time In/Time Out  Time In: 4278  Time Out: Caleb 21, OT

## 2022-05-03 NOTE — ROUTINE PROCESS
Bedside and Verbal shift change report given to Niurka Ohara RN (oncoming nurse) by self (offgoing nurse). Report included the following information SBAR, Kardex, Procedure Summary, MAR and Recent Results.

## 2022-05-03 NOTE — DISCHARGE SUMMARY
Hospitalist Discharge Summary   Admit Date:  2022  5:26 PM   DC Note date: 5/3/2022  Name:  Jenny Cisneros   Age:  61 y.o. Sex:  male  :  1955   MRN:  [id number]   Room:  [facility]  PCP:  Sammie Brittle, NP    Presenting Complaint: Syncope    Initial Admission Diagnosis: Syncope [R55]     Problem List for this Hospitalization:  Hospital Problems as of 5/3/2022 Never Reviewed          Codes Class Noted - Resolved POA    * (Principal) Syncope ICD-10-CM: R55  ICD-9-CM: 780.2  2022 - Present Yes        Complete heart block ([address] ICD-10-CM: I44.2  ICD-9-CM: 426.0  2022 - Present Yes        Amaurosis fugax of right eye ICD-10-CM: G45.3  ICD-9-CM: 362.34  2022 - Present Yes        RBBB (Chronic) ICD-10-CM: I45.10  ICD-9-CM: 426.4  3/28/2022 - Present Yes        Coronary atherosclerosis of native coronary vessel (Chronic) ICD-10-CM: I25.10  ICD-9-CM: 414.01  2017 - Present Yes    Overview Addendum 2022 10:22 AM by Chelle Garnett MD     1. Unstable angina (3/29/13):   a. LHC:  LMCA: Normal.  LAD: 20% prox. 20-30% mid. LCx: Normal.  OM3: 20% prox. RCA: 20-30% proximal.  95% distal.  RPL: 30% RPL. EF 65%. Normal wall motion. b.  PCI of distal RCA:   4 X 23 mm Xience RENETTA. 2. Cardiolite at [facility] 2017. Normal   3. Echo (3/31/2022): EF 60-65%. Normal diastolic function. Mild MR/TR. Primary hypertension (Chronic) ICD-10-CM: I10  ICD-9-CM: 401.9  3/29/2013 - Present Yes        Dyslipidemia (Chronic) ICD-10-CM: E78.5  ICD-9-CM: 272.4  3/29/2013 - Present Yes            Did Patient have Sepsis (YES OR NO): No    Syncopal episode secondary to complete heart block status post pacemaker placement  Amaurosis fugax resolved  Coronary artery disease  Hypertension  Dyslipidemia  Hypothyroidism  Hyponatremia ruled out    Hospital Course:   This is a 73-year-old male with past medical history significant for GERD, hypothyroidism, hypertension, coronary artery disease, dyslipidemia presents in the setting of loss of consciousness and vision loss in the right eye. Patient was in his usual state of health until the afternoon of presentation when he dropped his cup of coffee and is due to pick it up and felt lightheaded and lost consciousness. No chest pain no palpitations. No jerking movements of extremities tongue bite or urinary or fecal incontinence. CT head negative. EKG showed complete heart block. Patient developed transient loss of consciousness as well as amaurosis fugax. Cardiology was consulted. Patient underwent emergent temporary pacemaker implantation. Later on 5/2, patient had permanent pacemaker placement. Neurology was consulted for amaurosis fugax. CTA head and neck was done which showed no evidence of large vessel occlusion. Scattered nodular opacities in the lungs. Clinically, No signs of infection. Needs outpatient follow-up. Postprocedure he was doing well. No pneumothorax on chest x-ray. Patient is discharged home today in a stable condition to follow-up with primary care physician in 3 to 5 days, cardiology in 1 to 2 weeks. Disposition: Home or Self Care  Diet: ADULT DIET Regular  Code Status: Full Code    Follow Up Orders:   Follow-up Appointments   Procedures    FOLLOW UP VISIT Appointment in: 3 - 5 Days F/U WITH PCP IN 3-5 DAYS F/U WITH CARDIOLOGY, Dr Mahi Ribera 1-2 WEEKS     F/U WITH PCP IN 3-5 DAYS  F/U WITH CARDIOLOGY, Dr Mahi Ribera 1-2 WEEKS     Standing Status:   Standing     Number of Occurrences:   1     Order Specific Question:   Appointment in     Answer:   3 - 5 Days       Follow-up Information     Follow up With Specialties Details Why Elidia Fuentes OFFICE Cardiology  Office will call with appt  [address] [facility] [address] [address]  [address] NP Nurse Practitioner, Family Medicine  As needed [address] [address]  [phone]            Follow up labs/diagnostics (ultimately defer to outpatient provider):  CBC, BMP in 3 to 5 days. Nodular opacities in the lungs seen on CTA head and neck-needs repeat imaging and follow-up by PCP. Time spent in patient discharge and coordination  39 minutes. Plan was discussed with the patient. All questions answered. Patient was stable at time of discharge. Instructions given to call a physician or return if any concerns. Discharge Info:   Current Discharge Medication List      CONTINUE these medications which have NOT CHANGED    Details   allopurinoL (ZYLOPRIM) 300 mg tablet Take 300 mg by mouth daily. pantoprazole (Protonix) 40 mg tablet Take 40 mg by mouth daily. alirocumab (PRALUENT) 150 mg/mL injector pen 1 mL by SubCUTAneous route Once every 2 weeks. Qty: 6 mL, Refills: 3      coenzyme q10 (Co Q-10) 10 mg cap Take  by mouth.      krill-om-3-dha-epa-phospho-ast (MegaRed Omega-3 Krill Oil) 1,000-230-60 mg cap Take  by mouth. aspirin delayed-release 81 mg tablet Take 1 Tablet by mouth daily. Qty: 90 Tablet, Refills: 3    Associated Diagnoses: Atherosclerosis of native coronary artery of native heart without angina pectoris      valsartan-hydroCHLOROthiazide (DIOVAN-HCT) 320-25 mg per tablet Take 1 Tab by mouth daily. Qty: 1 Tab, Refills: 0      nitroglycerin (NITROSTAT) 0.4 mg SL tablet 1 Tab by SubLINGual route every five (5) minutes as needed for Chest Pain. Qty: 25 Tab, Refills: 2      levothyroxine (SYNTHROID) 100 mcg tablet Take 100 mcg by mouth Daily (before breakfast).          STOP taking these medications       buPROPion XL (WELLBUTRIN XL) 300 mg XL tablet Comments:   Reason for Stopping:               Procedures done this admission:  Procedure(s):  INSERT PPM DUAL    Consults this admission:  IP CONSULT TO NEUROLOGY  IP CONSULT TO PHYSIATRIST(REHAB MEDICINE)  IP CONSULT TO CARDIOLOGY    Echocardiogram/EKG results:  Results from [facility]  ILEANA Encounter encounter on 05/01/22    ECHO ADULT FOLLOW-UP OR LIMITED    Interpretation Summary    Left Ventricle: Normal left ventricular systolic function. Left ventricle size is normal. Normal wall thickness. Normal wall motion.   No pericardial effusion.        EKG Results     Procedure 720 Value Units Date/Time    EKG, 12 LEAD, INITIAL [[id number]] Collected: 05/02/22 1558    Order Status: Completed Updated: 05/02/22 1729     Ventricular Rate 64 BPM      Atrial Rate 64 BPM      P-R Interval 218 ms      QRS Duration 136 ms      Q-T Interval 434 ms      QTC Calculation (Bezet) 447 ms      Calculated P Axis 56 degrees      Calculated R Axis 61 degrees      Calculated T Axis 39 degrees      Diagnosis --     Sinus rhythm with 1st degree A-V block  Right bundle branch block  Abnormal ECG    Confirmed by ST OK SMITH MD ()NICO ([id number]) on 5/2/2022 5:29:31 PM      EKG, 12 LEAD, SUBSEQUENT [[id number]] Collected: 05/01/22 2056    Order Status: Completed Updated: 05/02/22 1656     Ventricular Rate 32 BPM      Atrial Rate 82 BPM      QRS Duration 150 ms      Q-T Interval 514 ms      QTC Calculation (Bezet) 375 ms      Calculated P Axis 0 degrees      Calculated R Axis 28 degrees      Calculated T Axis 74 degrees      Diagnosis --     Complete AV block with wide QRS complex  Right bundle branch block    Confirmed by ST OK SMITH MD ()NICO ([id number]) on 5/2/2022 4:56:30 PM      EKG [[id number]] Collected: 05/01/22 1739    Order Status: Completed Updated: 05/02/22 1649     Ventricular Rate 79 BPM      Atrial Rate 80 BPM      P-R Interval 206 ms      QRS Duration 140 ms      Q-T Interval 381 ms      QTC Calculation (Bezet) 437 ms      Calculated P Axis 45 degrees      Calculated R Axis 67 degrees      Calculated T Axis -7 degrees      Diagnosis --     Sinus rhythm  Ventricular premature complex  Right bundle branch block  Probable inferolateral infarct, age indeterm    Confirmed by ST OK SMITH MD ()NICO ([id number]) on 5/2/2022 4:49:06 PM            Diagnostic Imaging/Tests:   CT HEAD WO CONT    Result Date: 5/1/2022  EXAMINATION: HEAD CT WITHOUT CONTRAST 5/1/2022 6:35 PM ACCESSION NUMBER: [id number] INDICATION: Syncope, transient loss of right eye vision COMPARISON: None available TECHNIQUE: Multiple-row detector helical CT examination of the head without intravenous contrast. Radiation dose reduction techniques were used for this study:  Our CT scanners use one or all of the following: Automated exposure control, adjustment of the mA and/or kVp according to patient's size, iterative reconstruction. FINDINGS: No evidence of intracranial mass, hemorrhage, or large territorial infarct. The ventricles are normal in size and position. The basal cisterns are patent. No extra-axial fluid collection or mass effect. The orbital contents are within normal limits. The paranasal sinuses are clear. The mastoid air cells and middle ears are clear. No significant osseous or extracranial soft tissue lesions. No acute intracranial abnormality. XR CHEST PORT    Result Date: 5/2/2022  CHEST X-RAY, 1 VIEW INDICATION: Cardiac pacemaker placement COMPARISON: 5/1/2022 TECHNIQUE: Single AP view of the chest was obtained. FINDINGS: Lungs: Normal. Cardiomediastinal silhouette: Dual-chamber cardiac pacer. Leads appear intact and well positioned. Pleura: No pneumothorax or pleural effusion. Osseous structures: Normal.     No pneumothorax following dual-chamber cardiac pacemaker placement. XR CHEST PORT    Result Date: 5/1/2022  EXAM: XR CHEST PORT INDICATION: Syncope COMPARISON: Chest radiograph, 3/29/2013 FINDINGS: The cardiomediastinal silhouette is within normal limits. Atherosclerotic calcifications in the aorta. No focal parenchymal process. No pleural effusion. No pneumothorax. No acute osseous abnormality. Atherosclerosis without acute cardiopulmonary abnormality.       CTA CODE NEURO HEAD AND NECK W CONT    Result Date: 5/2/2022  Title:  CT arteriogram of the neck and head. Indication: Transient loss of vision in the right eye. Technique: Axial images of the neck and head were obtained after the uneventful administration of intravenous iodinated contrast media. Contrast was used to best identify the arterial structures. Images were reviewed on a separate, free standing, three-dimensional workstation as per the referring physicians request.  All stenosis percentages derived by comparing the narrowest segment with the distal Internal Carotid Artery luminal diameter, as described in the Michael American Symptomatic Carotid Endarterectomy Trial (NASCET) criteria. All CT scans at this facility are performed using dose reduction/dose modulation techniques, as appropriate the performed exam, including the following: Automated Exposure Control; Adjustment of the mA and/or kV according to patient size (this includes techniques or standardized protocols for targeted exams where dose is matched to indication/reason for exam); and Use of Iterative Reconstruction Technique. Comparison: None. Findings: Lungs: Interstitial and nodular opacities of the upper lungs, possibly edema versus an atypical infection Soft Tissues: Some mucosal thickening primarily in the left maxillary sinus Cervical Spine: Degenerative changes Aorta: Arch. Mild atherosclerosis Great Vessels: Patent Right ICA: Patent % Stenosis: 0 Right MCA: Patent Right BARRETT: Patent Left ICA: Minimal plaque % Stenosis: 0 Left MCA: Patent Left BARRETT: Patent Right Vertebral: Patent Left Vertebral: Patent Dominance: Codominant Basilar: Patent Right PCA: Patent. Fetal origin Left PCA: Patent. Prominent posterior communicating artery Other Vascular: Negative     No evidence of large vessel occlusion. Scattered nodular opacities in the lungs, possibly an atypical infection     ECHO ADULT FOLLOW-UP OR LIMITED    Result Date: 5/2/2022    Left Ventricle: Normal left ventricular systolic function. Left ventricle size is normal. Normal wall thickness. Normal wall motion.   No pericardial effusion. ECHO ADULT FOLLOW-UP OR LIMITED    Result Date: 5/2/2022    Left Ventricle: Normal left ventricular systolic function with a visually estimated EF of 55 - 60%. Left ventricle size is normal. Normal wall thickness. Normal diastolic function.   Tricuspid Valve: Mild regurgitation.   Contrast used: Definity. CARDIAC PROCEDURE    Result Date: 5/1/2022  · Temporary pacemaker implantation via right femoral vein. ELECTROPHYSIOLOGY PROCEDURE    Result Date: 5/2/2022  Pre-Procedure Diagnosis 1. Documented non-reversible symptomatic bradycardia due to third degree atrioventricular block. Procedure Performed 1. Insertion of a dual chamber PPM Estimated Blood Loss: Less than 10 mL Patient Information and Indications: The procedure, indications, risks, benefits, and alternatives were discussed with the patient and family members, who desired to proceed after questions were answered and informed consent was documented. Procedure: After informed consent was obtained, the patient was brought to the Electrophysiology Laboratory in a fasting state and was prepped and draped in sterile fashion. Prophylactic antibiotic was administered 10 minutes prior to skin incision: refer to anesthesia procedural documentation for full details. Conscious sedation was administered by anesthesia with continuous oxygen saturation measurement and blood pressure measurement. Local anesthetic (sensorcaine) was delivered to the left pectoral region and an incision was made parallel to the deltopectoral groove. A subcutaneous pocket was created using blunt dissection and electrocautery, and adequate hemostasis was established. Access x 2 was obtained under ultrasound guidance using a modified Seldinger technique with placement of 2 wires down into the RA and advanced below the diaphragm.   Next, placement of a 6Fr peel-away sheath over the first guidewire was performed. A permanent RV pm lead was then advanced under fluoroscopic guidance via the 6Fr sheath into the RV in a stable position with satisfactory sensing and pacing characteristics. The peel away sheath was removed. A 6Fr Safe-sheath was then placed in the second guidewire. A permanent pacing lead was advanced under fluoroscopic guidance and positioned in the right atrium. Stable RA appendage position with satisfactory sensing and pacing characteristics was obtained. The sheath was peeled. The leads were sutured to the underlying pectoralis fascia using 2 non-absorbable sutures around each lead's collar. The lead slack and position was assessed under fluoroscopy while securing the lead collars to ensure proper length. Final lead testing via the pacing system analyzer (PSA) demonstrated stable sensing, impedance and pacing thresholds. The lead pins were then cleaned with antibiotic soaked gauze, dried gently, and attached to a new pacemaker generator. Pins were directly observed to pass the tip electrode, and the ring hex wrench screws were secured, and leads tug tested. The device and leads were gently positioned within the pocket after the pocket was irrigated copiously with a saline antimicrobial solution. The wound was closed with multiple layers of absorbable suture followed by skin closure with staples. Fluoroscopic images were interpreted by me, in multiple projections. Final device position was confirmed by stored fluoroscopic image from device pocket to lead tips in AP projection. The patient tolerated the procedure well and left the lab in good condition.  Complications: None Lead Data: Pulse Generator Model #  Serial # Location Implant/Explant [id number] [facility] [id number] Left Pectoral Implant [id number] Lead Model Number  Serial Number Lead position Implant/Explant RA 7841 [facility] [id number] RA Appendage Implant 05.02.2022 RV 7842 [facility] [id number] RV Alloy Implant [id number] Lead Sensitivity and Threshold Lead R or P sensitivity (mv) Threshold (V) Threshold PW (msec) Impedance (ohms) Final output Voltage (V) Final PW (msec) RA 2.4 0.6 0.4 616 3.5 0.4 RV 11.7 0.4 0.4 960 3.5 0.4 Bradycardia Settings Amilcar Mode LRL URL Pace AVD (ms) Sense AVD (ms) Rate Response Mode Switching Mode SW Rate DDDR 60 130 300 300 Yes Yes 170 Contrast: 0cc Fluoro Time: 1.3 minutes Impression: 1. Successful implantation and testing of dual chamber [employer] Pacemaker. Luz Wetzel MD, MS Clinical Cardiac Electrophysiology 5/2/2022 3:10 PM       All Micro Results     None          Labs: Results:       BMP, Mg, Phos Recent Labs     05/03/22  0602 05/02/22  0240 05/01/22  1814   * 135* 135*   K 3.5 3.9 3.7    99 99   CO2 29 29 28   AGAP 7 7 8   BUN 13 14 13   CREA 1.00 1.10 1.20   CA 8.7 9.1 9.2   * 116* 116*   MG  --  2.0 1.8      CBC Recent Labs     05/03/22  0602 05/02/22  0240 05/01/22  1814   WBC 7.5 7.6 11.8*   RBC 3.85* 4.04* 4.24   HGB 12.9* 13.4* 14.7   HCT 36.1* 36.8* 38.9*    160 177   GRANS 59 66 83*   LYMPH 24 25 11*   EOS 7 2 1   MONOS 9 7 4   BASOS 1 0 0   IG 0 0 0   ANEU 4.5 5.0 9.8*   ABL 1.8 1.9 1.3   MERYL 0.5 0.2 0.2   ABM 0.7 0.5 0.5   ABB 0.0 0.0 0.0   AIG 0.0 0.0 0.0      LFT Recent Labs     05/01/22  1814   ALT 71*   AP 53   TP 7.8   ALB 3.8   GLOB 4.0*   AGRAT 1.0*      Cardiac Testing Lab Results   Component Value Date/Time     (H) 05/02/2022 02:40 AM      Coagulation Tests No results found for: PTP, INR, APTT, INREXT   A1c Lab Results   Component Value Date/Time    Hemoglobin A1c 5.3 05/02/2022 02:40 AM      Lipid Panel Lab Results   Component Value Date/Time    Cholesterol, total 129 05/02/2022 02:40 AM    HDL Cholesterol 42 05/02/2022 02:40 AM    LDL, calculated 48.4 05/02/2022 02:40 AM    VLDL, calculated 38.6 (H) 05/02/2022 02:40 AM    Triglyceride 193 (H) 05/02/2022 02:40 AM    CHOL/HDL Ratio 3.1 05/02/2022 02:40 AM      Thyroid Panel Lab Results   Component Value Date/Time    TSH 0.957 05/01/2022 06:14 PM    T4, Free 1.2 05/02/2022 02:40 AM        Most Recent UA Lab Results   Component Value Date/Time    Color YELLOW 05/01/2022 09:40 PM    Appearance CLEAR 05/01/2022 09:40 PM    pH (UA) 7.0 05/01/2022 09:40 PM    Protein Negative 05/01/2022 09:40 PM    Glucose Negative 05/01/2022 09:40 PM    Ketone Negative 05/01/2022 09:40 PM    Bilirubin Negative 05/01/2022 09:40 PM    Blood Negative 05/01/2022 09:40 PM    Urobilinogen 0.2 05/01/2022 09:40 PM    Nitrites Negative 05/01/2022 09:40 PM    Leukocyte Esterase Negative 05/01/2022 09:40 PM    Bacteria 0 05/01/2022 09:40 PM          All Labs from Last 24 Hrs:  Recent Results (from the past 24 hour(s))   EKG, 12 LEAD, INITIAL    Collection Time: 05/02/22  3:58 PM   Result Value Ref Range    Ventricular Rate 64 BPM    Atrial Rate 64 BPM    P-R Interval 218 ms    QRS Duration 136 ms    Q-T Interval 434 ms    QTC Calculation (Bezet) 447 ms    Calculated P Axis 56 degrees    Calculated R Axis 61 degrees    Calculated T Axis 39 degrees    Diagnosis       Sinus rhythm with 1st degree A-V block  Right bundle branch block  Abnormal ECG    Confirmed by ST OK SMITH MD (), NICO PRATT ([id number]) on 5/2/2022 5:29:31 PM     ECHO ADULT FOLLOW-UP OR LIMITED    Collection Time: 05/02/22  5:18 PM   Result Value Ref Range    IVSd 1.0 0.6 - 1.0 cm    LVIDd 4.3 4.2 - 5.9 cm    LVIDs 3.3 cm    LVPWd 1.0 0.6 - 1.0 cm    RVIDd 3.9 cm    Fractional Shortening 2D 23 28 - 44 %    LVIDd Index 2.06 cm/m2    LVIDs Index 1.58 cm/m2    LV RWT Ratio 0.47     LV Mass 2D 142.5 88 - 224 g    LV Mass 2D Index 68.2 49 - 115 g/m2   CBC WITH AUTOMATED DIFF    Collection Time: 05/03/22  6:02 AM   Result Value Ref Range    WBC 7.5 4.3 - 11.1 K/uL    RBC 3.85 (L) 4.23 - 5.6 M/uL    HGB 12.9 (L) 13.6 - 17.2 g/dL    HCT 36.1 (L) 41.1 - 50.3 %    MCV 93.8 79.6 - 97.8 FL    MCH 33.5 (H) 26.1 - 32.9 PG    MCHC 35.7 (H) 31.4 - 35.0 g/dL    RDW 12.5 11.9 - 14.6 %    PLATELET 154 587 - 977 K/uL    MPV 9.6 9.4 - 12.3 FL    ABSOLUTE NRBC 0.00 0.0 - 0.2 K/uL    DF AUTOMATED      NEUTROPHILS 59 43 - 78 %    LYMPHOCYTES 24 13 - 44 %    MONOCYTES 9 4.0 - 12.0 %    EOSINOPHILS 7 0.5 - 7.8 %    BASOPHILS 1 0.0 - 2.0 %    IMMATURE GRANULOCYTES 0 0.0 - 5.0 %    ABS. NEUTROPHILS 4.5 1.7 - 8.2 K/UL    ABS. LYMPHOCYTES 1.8 0.5 - 4.6 K/UL    ABS. MONOCYTES 0.7 0.1 - 1.3 K/UL    ABS. EOSINOPHILS 0.5 0.0 - 0.8 K/UL    ABS. BASOPHILS 0.0 0.0 - 0.2 K/UL    ABS. IMM.  GRANS. 0.0 0.0 - 0.5 K/UL   METABOLIC PANEL, BASIC    Collection Time: 05/03/22  6:02 AM   Result Value Ref Range    Sodium 137 (L) 138 - 145 mmol/L    Potassium 3.5 3.5 - 5.1 mmol/L    Chloride 101 98 - 107 mmol/L    CO2 29 21 - 32 mmol/L    Anion gap 7 7 - 16 mmol/L    Glucose 103 (H) 65 - 100 mg/dL    BUN 13 8 - 23 MG/DL    Creatinine 1.00 0.8 - 1.5 MG/DL    GFR est AA >60 >60 ml/min/1.73m2    GFR est non-AA >60 >60 ml/min/1.73m2    Calcium 8.7 8.3 - 10.4 MG/DL       Current Med List in Hospital:   Current Facility-Administered Medications   Medication Dose Route Frequency    sodium chloride (NS) flush 5-40 mL  5-40 mL IntraVENous Q8H    sodium chloride (NS) flush 5-40 mL  5-40 mL IntraVENous PRN    morphine injection 2 mg  2 mg IntraVENous Q4H PRN    sodium chloride (NS) flush 5-10 mL  5-10 mL IntraVENous PRN    sodium chloride (NS) flush 5-40 mL  5-40 mL IntraVENous Q8H    acetaminophen (TYLENOL) tablet 650 mg  650 mg Oral Q6H PRN    Or    acetaminophen (TYLENOL) suppository 650 mg  650 mg Rectal Q6H PRN    polyethylene glycol (MIRALAX) packet 17 g  17 g Oral DAILY PRN    ondansetron (ZOFRAN ODT) tablet 4 mg  4 mg Oral Q8H PRN    Or    ondansetron (ZOFRAN) injection 4 mg  4 mg IntraVENous Q6H PRN    enoxaparin (LOVENOX) injection 40 mg  40 mg SubCUTAneous DAILY    aspirin delayed-release tablet 81 mg  81 mg Oral DAILY    levothyroxine (SYNTHROID) tablet 100 mcg  100 mcg Oral ACB    [Held by provider] valsartan (DIOVAN) 320 mg, hydroCHLOROthiazide (HYDRODIURIL) 25 mg   Oral DAILY    pantoprazole (PROTONIX) tablet 40 mg  40 mg Oral ACB    allopurinoL (ZYLOPRIM) tablet 300 mg  300 mg Oral DAILY    sodium chloride (NS) flush 5-40 mL  5-40 mL IntraVENous PRN       Allergies   Allergen Reactions    Norvasc [Amlodipine] Swelling     There is no immunization history for the selected administration types on file for this patient.     Recent Vital Data:  Patient Vitals for the past 24 hrs:   Temp Pulse Resp BP SpO2   05/03/22 0730 98 °F (36.7 °C) 67 18 (!) 156/91 94 %   05/03/22 0440 98 °F (36.7 °C) 66 24 134/88 95 %   05/02/22 2352 98 °F (36.7 °C) 73 24 (!) 156/88 94 %   05/02/22 2333 -- 78 26 (!) 146/75 95 %   05/02/22 2319 -- 76 23 (!) 172/79 96 %   05/02/22 2304 -- -- 20 -- --   05/02/22 2303 98.1 °F (36.7 °C) 79 -- 133/77 95 %   05/02/22 2302 -- -- 20 -- --   05/02/22 2250 -- -- 17 -- --   05/02/22 2248 -- 73 -- 137/82 94 %   05/02/22 2247 -- -- 13 -- --   05/02/22 2233 -- 68 16 138/82 94 %   05/02/22 2219 -- 73 19 135/79 95 %   05/02/22 2203 -- 71 27 136/81 95 %   05/02/22 2202 -- 70 17 136/81 95 %   05/02/22 2150 -- 74 17 -- 96 %   05/02/22 2148 -- 71 28 134/76 94 %   05/02/22 2133 -- 71 22 131/66 95 %   05/02/22 2119 -- 74 12 134/78 97 %   05/02/22 2103 -- 67 13 130/68 96 %   05/02/22 2049 -- -- 25 -- --   05/02/22 2048 -- 68 -- 136/75 96 %   05/02/22 2034 -- -- 24 -- --   05/02/22 2033 -- 66 -- 136/72 96 %   05/02/22 2032 -- -- 15 -- --   05/02/22 2019 -- 69 23 139/74 96 %   05/02/22 2004 -- -- 27 -- --   05/02/22 2003 -- 68 -- 139/63 97 %   05/02/22 1951 -- 66 29 -- 97 %   05/02/22 1949 -- -- 21 -- --   05/02/22 1948 -- 67 -- 134/77 96 %   05/02/22 1947 -- -- 24 -- --   05/02/22 1933 -- 66 20 133/72 96 %   05/02/22 1919 -- 69 21 137/71 95 %   05/02/22 1905 -- -- 22 -- --   05/02/22 1904 98.1 °F (36.7 °C) 69 23 139/73 96 %   05/02/22 1808 -- 68 23 128/69 96 %   05/02/22 1753 -- 70 12 137/74 97 %   05/02/22 1738 -- 68 24 139/72 96 %   05/02/22 1723 -- 73 26 -- 97 %   05/02/22 1722 -- 70 29 -- 96 %   05/02/22 1721 -- 73 28 -- 95 %   05/02/22 1720 -- 74 26 -- 96 %   05/02/22 1719 -- 72 20 -- 96 %   05/02/22 1718 -- 73 (!) 34 -- 95 %   05/02/22 1717 -- 73 27 -- 96 %   05/02/22 1716 -- 73 26 -- 95 %   05/02/22 1715 -- 74 18 (!) 162/80 95 %   05/02/22 1700 -- 66 30 (!) 141/85 96 %   05/02/22 1645 -- 67 19 134/87 95 %   05/02/22 1630 -- 63 (!) 35 133/80 97 %   05/02/22 1615 -- 64 24 138/79 97 %   05/02/22 1600 -- 63 25 134/73 98 %   05/02/22 1548 -- 64 25 134/68 96 %   05/02/22 1535 96.8 °F (36 °C) 69 15 133/71 97 %   05/02/22 1534 97.8 °F (36.6 °C) 68 17 132/71 97 %   05/02/22 1355 -- 74 28 -- 97 %   05/02/22 1348 -- 70 30 (!) 159/80 96 %   05/02/22 1333 -- 66 22 (!) 147/73 96 %   05/02/22 1319 -- 68 (!) 35 (!) 150/82 94 %   05/02/22 1303 -- 70 (!) 42 (!) 148/78 95 %   05/02/22 1245 -- 72 27 (!) 154/78 93 %     Oxygen Therapy  O2 Sat (%): 94 % (05/03/22 0730)  Pulse via Oximetry: 79 beats per minute (05/02/22 2333)  O2 Device: None (Room air) (05/03/22 0836)    Estimated body mass index is 30.16 kg/m² as calculated from the following:    Height as of this encounter: 5' 9\" (1.753 m). Weight as of this encounter: 92.6 kg (204 lb 3.2 oz). Intake/Output Summary (Last 24 hours) at 5/3/2022 1240  Last data filed at 5/2/2022 2303  Gross per 24 hour   Intake 400 ml   Output 5 ml   Net 395 ml         Physical Exam:    General:    Well nourished. No overt distress  Head:  Normocephalic, atraumatic  Eyes:  Sclerae appear normal.  Pupils equally round. HENT:  Nares appear normal, no drainage. Moist mucous membranes  Neck:  No restricted ROM. Trachea midline  CV:   RRR. No m/r/g. No JVD, pacemaker- with dressing in place, intact. Lungs:   CTAB. No wheezing, rhonchi, or rales. Even, unlabored  Abdomen:   Soft, nontender, nondistended. Extremities: Warm and dry. No cyanosis or clubbing. No edema.     Skin:     No rashes. Normal coloration  Neuro:  CN II-XII grossly intact. Psych:  Normal mood and affect. Signed:  Veronica Samuel MD    Part of this note may have been written by using a voice dictation software. The note has been proof read but may still contain some grammatical/other typographical errors.

## 2022-05-03 NOTE — PROGRESS NOTES
[facility] CARDIOLOGY PROGRESS NOTE           5/3/2022 6:53 AM    Admit Date: 5/1/2022    Admit Diagnosis: Syncope [R55]      Subjective:   No complaints this AM, no chest pain or shortness of breath, appropriate post op device pocket pain    Interval History: (History of pertinent interval events obtained from nursing staff)  Cardiac device placed yesterday, no events overnight, no immediate complications    ROS:  GEN:  No fever or chills  Cardiovascular:  As noted above  Pulmonary:  As noted above  Neuro:  No new focal motor or sensory loss      Objective:     Vitals:    05/02/22 2319 05/02/22 2333 05/02/22 2352 05/03/22 0440   BP: (!) 172/79 (!) 146/75 (!) 156/88 134/88   Pulse: 76 78 73 66   Resp: 23 26 24 24   Temp:   98 °F (36.7 °C) 98 °F (36.7 °C)   SpO2: 96% 95% 94% 95%   Weight:   204 lb 1.6 oz (92.6 kg) 204 lb 3.2 oz (92.6 kg)   Height:           Physical Exam:  General-Well Developed, Well Nourished, No Acute Distress, Alert & Oriented x 3, appropriate mood. CV- regular rate and rhythm no MRG, left infraclavicular pocket with dressing in place, no evidence of hematoma, redness, warmth or excessive drainage  Lung- clear bilaterally  Abd- soft, nontender, nondistended  Ext- no edema bilaterally.     Current Facility-Administered Medications   Medication Dose Route Frequency    sodium chloride (NS) flush 5-40 mL  5-40 mL IntraVENous Q8H    sodium chloride (NS) flush 5-40 mL  5-40 mL IntraVENous PRN    morphine injection 2 mg  2 mg IntraVENous Q4H PRN    sodium chloride (NS) flush 5-10 mL  5-10 mL IntraVENous PRN    sodium chloride (NS) flush 5-40 mL  5-40 mL IntraVENous Q8H    acetaminophen (TYLENOL) tablet 650 mg  650 mg Oral Q6H PRN    Or    acetaminophen (TYLENOL) suppository 650 mg  650 mg Rectal Q6H PRN    polyethylene glycol (MIRALAX) packet 17 g  17 g Oral DAILY PRN    ondansetron (ZOFRAN ODT) tablet 4 mg  4 mg Oral Q8H PRN    Or    ondansetron (ZOFRAN) injection 4 mg  4 mg IntraVENous Q6H PRN    enoxaparin (LOVENOX) injection 40 mg  40 mg SubCUTAneous DAILY    aspirin delayed-release tablet 81 mg  81 mg Oral DAILY    levothyroxine (SYNTHROID) tablet 100 mcg  100 mcg Oral ACB    [Held by provider] valsartan (DIOVAN) 320 mg, hydroCHLOROthiazide (HYDRODIURIL) 25 mg   Oral DAILY    pantoprazole (PROTONIX) tablet 40 mg  40 mg Oral ACB    allopurinoL (ZYLOPRIM) tablet 300 mg  300 mg Oral DAILY    lactated Ringers infusion  75 mL/hr IntraVENous CONTINUOUS    sodium chloride (NS) flush 5-40 mL  5-40 mL IntraVENous PRN    DOPamine (INTROPIN) 800 mg in dextrose 5% 250 mL infusion  0-20 mcg/kg/min IntraVENous TITRATE     Data Review:   Recent Results (from the past 24 hour(s))   ECHO ADULT FOLLOW-UP OR LIMITED    Collection Time: 05/02/22 10:50 AM   Result Value Ref Range    LV EDV A2C 113 mL    LV EDV A4C 138 mL    LV ESV A2C 36 mL    LV ESV A4C 47 mL    IVSd 0.9 0.6 - 1.0 cm    LVIDd 4.3 4.2 - 5.9 cm    LVIDs 2.7 cm    LVOT Diameter 2.3 cm    LVPWd 0.9 0.6 - 1.0 cm    LV E' Lateral Velocity 13 cm/s    LV E' Septal Velocity 8 cm/s    LV Ejection Fraction A2C 68 %    LV Ejection Fraction A4C 66 %    EF BP 68 55 - 100 %    LVOT Area 4.2 cm2    Aortic Root 3.2 cm    IVC Proxmal 1.5 cm    LA Diameter 3.9 cm    MV E Wave Deceleration Time 214.0 ms    MV A Velocity 0.58 m/s    MV E Velocity 0.77 m/s    Est. RA Pressure 3 mmHg    RVIDd 3.6 cm    Fractional Shortening 2D 37 28 - 44 %    LV ESV Index A4C 22 mL/m2    LV EDV Index A4C 66 mL/m2    LV ESV Index A2C 17 mL/m2    LV EDV Index A2C 54 mL/m2    LVIDd Index 2.06 cm/m2    LVIDs Index 1.29 cm/m2    LV RWT Ratio 0.42     LV Mass 2D 123.3 88 - 224 g    LV Mass 2D Index 59.0 49 - 115 g/m2    MV E/A 1.33     E/E' Ratio (Averaged) 7.77     E/E' Lateral 5.92     E/E' Septal 9.63     LA Size Index 1.87 cm/m2    LA/AO Root Ratio 1.22     Ao Root Index 1.53 cm/m2   EKG, 12 LEAD, INITIAL    Collection Time: 05/02/22  3:58 PM   Result Value Ref Range    Ventricular Rate 64 BPM    Atrial Rate 64 BPM    P-R Interval 218 ms    QRS Duration 136 ms    Q-T Interval 434 ms    QTC Calculation (Bezet) 447 ms    Calculated P Axis 56 degrees    Calculated R Axis 61 degrees    Calculated T Axis 39 degrees    Diagnosis       Sinus rhythm with 1st degree A-V block  Right bundle branch block  Abnormal ECG    Confirmed by ST OK SMITH MD (), NICO PRATT ([id number]) on 5/2/2022 5:29:31 PM     ECHO ADULT FOLLOW-UP OR LIMITED    Collection Time: 05/02/22  5:18 PM   Result Value Ref Range    IVSd 1.0 0.6 - 1.0 cm    LVIDd 4.3 4.2 - 5.9 cm    LVIDs 3.3 cm    LVPWd 1.0 0.6 - 1.0 cm    RVIDd 3.9 cm    Fractional Shortening 2D 23 28 - 44 %    LVIDd Index 2.06 cm/m2    LVIDs Index 1.58 cm/m2    LV RWT Ratio 0.47     LV Mass 2D 142.5 88 - 224 g    LV Mass 2D Index 68.2 49 - 115 g/m2   CBC WITH AUTOMATED DIFF    Collection Time: 05/03/22  6:02 AM   Result Value Ref Range    WBC 7.5 4.3 - 11.1 K/uL    RBC 3.85 (L) 4.23 - 5.6 M/uL    HGB 12.9 (L) 13.6 - 17.2 g/dL    HCT 36.1 (L) 41.1 - 50.3 %    MCV 93.8 79.6 - 97.8 FL    MCH 33.5 (H) 26.1 - 32.9 PG    MCHC 35.7 (H) 31.4 - 35.0 g/dL    RDW 12.5 11.9 - 14.6 %    PLATELET 087 664 - 614 K/uL    MPV 9.6 9.4 - 12.3 FL    ABSOLUTE NRBC 0.00 0.0 - 0.2 K/uL    DF AUTOMATED      NEUTROPHILS 59 43 - 78 %    LYMPHOCYTES 24 13 - 44 %    MONOCYTES 9 4.0 - 12.0 %    EOSINOPHILS 7 0.5 - 7.8 %    BASOPHILS 1 0.0 - 2.0 %    IMMATURE GRANULOCYTES 0 0.0 - 5.0 %    ABS. NEUTROPHILS 4.5 1.7 - 8.2 K/UL    ABS. LYMPHOCYTES 1.8 0.5 - 4.6 K/UL    ABS. MONOCYTES 0.7 0.1 - 1.3 K/UL    ABS. EOSINOPHILS 0.5 0.0 - 0.8 K/UL    ABS. BASOPHILS 0.0 0.0 - 0.2 K/UL    ABS. IMM.  GRANS. 0.0 0.0 - 0.5 K/UL       EKG:  (EKG has been independently visualized by me with interpretation below)  Assessment:     Principal Problem:    Syncope (5/1/2022)    Active Problems:    Primary hypertension (3/29/2013)      Dyslipidemia (3/29/2013)      Coronary atherosclerosis of native coronary vessel (1/16/2017)      Overview: 1. Unstable angina (3/29/13):       a. LHC:  LMCA: Normal.  LAD: 20% prox. 20-30% mid. LCx: Normal.  OM3:       20% prox. RCA: 20-30% proximal.  95% distal.  RPL: 30% RPL. EF 65%. Normal wall motion. b.  PCI of distal RCA:   4 X 23 mm Xience RENETTA. 2. Cardiolite at [facility] 2017. Normal       3. Echo (3/31/2022): EF 60-65%. Normal diastolic function. Mild MR/TR. RBBB (3/28/2022)      Complete heart block ([address] (5/1/2022)      Amaurosis fugax of right eye (5/1/2022)      Plan:   1. Cardiac device implantation: Pt device interrogation this AM reveals normal function, excellent pacing and sensing parameters, CXR without pneumothorax with excellent device position, no recognized complications, stable for discharge home today with appropriate follow up. Shanika Wetzel MD  Cardiology/Electrophysiology

## 2022-05-03 NOTE — PROGRESS NOTES
ACUTE PHYSICAL THERAPY GOALS:  (Developed with and agreed upon by patient and/or caregiver. )  LTG:  (1.)Mr. Jeanine Pierre will ambulate with MODIFIED INDEPENDENCE for 200 feet with the least restrictive device within 7 treatment day(s). ________________________________________________________________________________________________          PHYSICAL THERAPY ASSESSMENT: Initial Assessment, Discharge and AM PT Treatment Day # 1   PACER PRECAUTIONS      Venecia Boss is a 61 y.o. male   PRIMARY DIAGNOSIS: Syncope  Syncope [R55]  Procedure(s) (LRB):  INSERT PPM DUAL (N/A)  1 Day Post-Op  Reason for Referral:    ICD-10: Treatment Diagnosis: Generalized Muscle Weakness (M62.81)  INPATIENT: Payor: [employer] / Plan: Monisha Mercado / Product Type: Commerical /     ASSESSMENT:     REHAB RECOMMENDATIONS:   Recommendation to date pending progress:  Setting:   No further skilled therapy after discharge from hospital  Equipment:    None     PRIOR LEVEL OF FUNCTION:  (Prior to Hospitalization) INITIAL/CURRENT LEVEL OF FUNCTION:  (Most Recently Demonstrated)   Bed Mobility:   Independent  Sit to Stand:   Independent  Transfers:   Independent  Gait/Mobility:   Independent Bed Mobility:   Standby Assistance  Sit to Stand:   Supervision  Transfers:   Supervision  Gait/Mobility:  [facility] Independent     ASSESSMENT:  Mr. Jeanine Pierre presents to PT with [facility] AROM and WFL strength in B LEs. Pt educated on post op pacer precautions and verbalized understanding. He performed transfers with supervision-SBA and good standing balance. Pt ambulated in raygoza with supervision-independence and WFL gait pattern. Pt educated on the importance of activity pacing upon return home and verbalized understanding. Mr. Jeanine Pierre appears to be functioning close to baseline and will be discharged from caseload.           SUBJECTIVE:   Mr. Jeanine Pierre states, \"[place]\"    SOCIAL HISTORY/LIVING ENVIRONMENT: Lives with wife and independent with ambulation PTA  Home Environment: Independent living  One/Two Story Residence: One story  Living Alone: No  Support Systems: Spouse/Significant Other  OBJECTIVE:     PAIN: VITAL SIGNS: LINES/DRAINS:   Pre Treatment: Pain Screen  Pain Scale 1: Numeric (0 - 10)  Pain Intensity 1: 0  Post Treatment: 0   L UE sling  O2 Device: None (Room air)     GROSS EVALUATION:  B LE Within Functional Limits Abnormal/ Functional Abnormal/ Non-Functional (see comments) Not Tested Comments:   AROM [x] [] [] []    PROM [] [] [] []    Strength [x] [] [] []    Balance [x] [] [] []    Posture [] [] [] []    Sensation [] [] [] []    Coordination [] [] [] []    Tone [] [] [] []    Edema [] [] [] []    Activity Tolerance [x] [] [] []     [] [] [] []      COGNITION/  PERCEPTION: Intact Impaired   (see comments) Comments:   Orientation [x] []    Vision [] []    Hearing [x] []    Command Following [x] []    Safety Awareness [x] []     [] []      MOBILITY: I Mod I S SBA CGA Min Mod Max Total  NT x2 Comments:   Bed Mobility    Rolling [] [] [] [x] [] [] [] [] [] [] []    Supine to Sit [] [] [] [x] [] [] [] [] [] [] []    Scooting [] [] [] [] [] [] [] [] [] [] []    Sit to Supine [] [] [] [] [] [] [] [] [] [] []    Transfers    Sit to Stand [] [] [] [x] [] [] [] [] [] [] []    Bed to Chair [] [] [x] [] [] [] [] [] [] [] []    Stand to Sit [] [] [x] [] [] [] [] [] [] [] []    I=Independent, Mod I=Modified Independent, S=Supervision, SBA=Standby Assistance, CGA=Contact Guard Assistance,   Min=Minimal Assistance, Mod=Moderate Assistance, Max=Maximal Assistance, Total=Total Assistance, NT=Not Tested  GAIT: I Mod I S SBA CGA Min Mod Max Total  NT x2 Comments:   Level of Assistance [x] [] [x] [] [] [] [] [] [] [] []    Distance 250 ft    DME None    Gait Quality WFL    Weightbearing Status N/A     I=Independent, Mod I=Modified Independent, S=Supervision, SBA=Standby Assistance, CGA=Contact Guard Assistance,   Min=Minimal Assistance, Mod=Moderate Assistance, Max=Maximal Assistance, Total=Total Assistance, NT=Not Tested    [facility] [employer] [facility] Form       How much difficulty does the patient currently have. .. Unable A Lot A Little None   1. Turning over in bed (including adjusting bedclothes, sheets and blankets)? [] 1   [] 2   [] 3   [x] 4   2. Sitting down on and standing up from a chair with arms ( e.g., wheelchair, bedside commode, etc.)   [] 1   [] 2   [] 3   [x] 4   3. Moving from lying on back to sitting on the side of the bed? [] 1   [] 2   [] 3   [x] 4   How much help from another person does the patient currently need. .. Total A Lot A Little None   4. Moving to and from a bed to a chair (including a wheelchair)? [] 1   [] 2   [] 3   [x] 4   5. Need to walk in hospital room? [] 1   [] 2   [] 3   [x] 4   6. Climbing 3-5 steps with a railing? [] 1   [] 2   [] 3   [x] 4   © 2007, Trustees of [employer], under license to [employer]. All rights reserved     Score:  Initial: 24 Most Recent: X (Date: -- )    Interpretation of Tool:  Represents activities that are increasingly more difficult (i.e. Bed mobility, Transfers, Gait). PLAN:   FREQUENCY/DURATION: PT Plan of Care:  (reema, tx, dc) for duration of hospital stay or until stated goals are met, whichever comes first.     1.      TREATMENT:     EVALUATION: Low Complexity : (Untimed Charge)    TREATMENT:   ($$ Therapeutic Activity: 8-22 mins    )  Therapeutic Activity (8 Minutes): Therapeutic activity included Ambulation on level ground to improve functional Mobility and Activity tolerance.     TREATMENT GRID:  N/A    AFTER TREATMENT POSITION/PRECAUTIONS:  Chair and Needs within reach    INTERDISCIPLINARY COLLABORATION:  RN/PCT, PT/PTA and OT/MASON    TOTAL TREATMENT DURATION:  PT Patient Time In/Time Out  Time In: 0913  Time Out: [address] [employer] Aj Montesinos PT, DPT

## 2022-05-03 NOTE — PROGRESS NOTES
LOS 2 D    CM following patient for discharge needs. Patient is s/p PPI for complete heartblock/ syncope. Care Management Interventions  PCP Verified by CM: Yes  Mode of Transport at Discharge:  Other (see comment)  Transition of Care Consult (CM Consult): Discharge Planning  Discharge Durable Medical Equipment:  (CPAP)  Support Systems: Spouse/Significant Other  Confirm Follow Up Transport: Family  Freedom of Choice List was Provided with Basic Dialogue that Supports the Patient's Individualized Plan of Care/Goals, Treatment Preferences and Shares the Quality Data Associated with the Providers?: Yes  [employer] Information Provided?:  (Generic comm/[employer] from his previous work where he retired. )  Discharge Location  Patient Expects to be Discharged to[de-identified] Home

## 2022-05-03 NOTE — ROUTINE PROCESS
TRANSFER - IN REPORT:    Verbal report received from [facility] AT [facility] RN(name) on Bernard Hayward  being received from ICU(unit) for routine progression of care      Report consisted of patients Situation, Background, Assessment and   Recommendations(SBAR). Information from the following report(s) SBAR, Kardex, Procedure Summary, MAR and Recent Results was reviewed with the receiving nurse. Opportunity for questions and clarification was provided. Assessment completed upon patients arrival to unit and care assumed. Pt presented to the floor with A left subclavian post procedural site w/ bandage. Pt also has a right groin site from procedure that is free from bleeding or hematoma. Otherwise pt skin intact, heels are intact bilaterally and alleyvan in place.

## 2022-05-18 PROBLEM — Z95.0 PACEMAKER: Status: ACTIVE | Noted: 2022-05-18

## 2022-05-31 ENCOUNTER — TELEPHONE (OUTPATIENT)
Dept: CARDIOLOGY CLINIC | Age: 67
End: 2022-05-31

## 2022-05-31 NOTE — TELEPHONE ENCOUNTER
Patient called and notified no episodes were reported on the device. Stable pacemaker function. Presenting sinus rhythm. Patient called and reporting he fell asleep and woke up startled and with left eye vision was black for a minute.      Will defer to triage per patient request.

## 2022-05-31 NOTE — TELEPHONE ENCOUNTER
Agree with seeing his ophthalmologist.  His pacemaker showed no arrhythmias. No cardiac etiology why he could have lost consciousness. If he has similar symptoms to check his blood pressure to make sure he is not hypotensive.

## 2022-05-31 NOTE — TELEPHONE ENCOUNTER
Had issues last night with device in 8 o'clock hour and would like to know if his readings that transferred over had shown anything at that time. Please advise.

## 2022-05-31 NOTE — TELEPHONE ENCOUNTER
Spoke with patient. He isn't sure what happened last night, but he was on the couch and either passed out or fell asleep. When he came out of the episode he was startled. Wife said it reminded her of the episode early this month when he was hospitalized. Patient reports loss of vision in left eye, he saw a ring with lines and it resolved within 30 seconds. He reports feeling fine now with no additional symptoms. He is staying hydrated and had 1 beer last night. He is taking medications as prescribed. I advised him to reach out to his eye doctor about the vision episode. Do you have any additional recommendations?

## 2022-06-23 ENCOUNTER — TELEPHONE (OUTPATIENT)
Dept: CARDIOLOGY CLINIC | Age: 67
End: 2022-06-23

## 2022-06-23 NOTE — TELEPHONE ENCOUNTER
Kristy Umaña from [facility] called to check status of PA for Praluent 150mg. Stated it was sent by fax.    Negro Rahman [phone]

## 2022-06-23 NOTE — TELEPHONE ENCOUNTER
CoverMyMeds ID Number given from [facility]  Will submit new PA//alvaroab

## 2022-06-24 ENCOUNTER — TELEPHONE (OUTPATIENT)
Dept: CARDIOLOGY CLINIC | Age: 67
End: 2022-06-24

## 2022-06-24 NOTE — TELEPHONE ENCOUNTER
Needs prior auth.  For Repatha Call [facility] at [address] [facility]  [phone]

## 2022-06-24 NOTE — TELEPHONE ENCOUNTER
PA for Praluent denied because it is not on he list of approved drugs for plan benefit. Covered drug is Repatha. Patient informed of denial. He will call pharmacy and find out copay of Sullivan Manner.  Right now he is paying $25 with copay card for Praluent. //alvaroab

## 2022-06-24 NOTE — TELEPHONE ENCOUNTER
[facility] at [employer] that she has a prescription on file for [address], tried to send it through and needs PA done.  She will put in [place] and send information//ofelia CORRALES sent for Repatha//[email or web address]

## 2022-06-28 NOTE — TELEPHONE ENCOUNTER
Request Reference Number: [id number]. Margarita Pouch 150MG/ML is approved through 12/27/2022. Your patient may now fill this prescription and it will be covered.   Pharmacy, [facility], and patient notified//ofelia

## 2022-07-06 ENCOUNTER — HOSPITAL ENCOUNTER (OUTPATIENT)
Dept: CT IMAGING | Age: 67
Discharge: HOME | End: 2022-07-09

## 2022-07-06 DIAGNOSIS — R91.1 APICAL LUNG NODULE: ICD-10-CM

## 2022-07-15 ENCOUNTER — TELEPHONE (OUTPATIENT)
Dept: PULMONOLOGY | Age: 67
End: 2022-07-15

## 2022-07-15 NOTE — TELEPHONE ENCOUNTER
Type Date User Summary Attachment   Specialty Comments 07/14/2022  9:38 AM Patricia Guy - -   Note    1. Sequelae remote granulomatous as outlined above evidenced by calcified pulmonary nodules and mediastinal / hilar lymph nodes. 2. Predominate perihilar nodular foci also likely related to granulomatous process although endobronchial infection not excluded. Pulmonary consult recommended. -CT chest 6/21/22     Sent to triage for review. 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 Referral scanned in. Patient has been referred to PPA for results of CT chest.  Patient saw PCP 6/17/2022-cough, CT chest done      [facility], please schedule this patient with next provider.

## 2022-07-21 ENCOUNTER — OFFICE VISIT (OUTPATIENT)
Dept: PULMONOLOGY | Age: 67
End: 2022-07-21
Payer: COMMERCIAL

## 2022-07-21 VITALS
RESPIRATION RATE: 18 BRPM | OXYGEN SATURATION: 96 % | DIASTOLIC BLOOD PRESSURE: 74 MMHG | SYSTOLIC BLOOD PRESSURE: 126 MMHG | HEART RATE: 67 BPM | TEMPERATURE: 97.4 F | HEIGHT: 69 IN | WEIGHT: 201.5 LBS | BODY MASS INDEX: 29.84 KG/M2

## 2022-07-21 DIAGNOSIS — J30.89 ENVIRONMENTAL AND SEASONAL ALLERGIES: ICD-10-CM

## 2022-07-21 DIAGNOSIS — R93.89 ABNORMAL CT OF THE CHEST: ICD-10-CM

## 2022-07-21 DIAGNOSIS — R06.02 SOB (SHORTNESS OF BREATH): ICD-10-CM

## 2022-07-21 DIAGNOSIS — R06.02 SOB (SHORTNESS OF BREATH): Primary | ICD-10-CM

## 2022-07-21 DIAGNOSIS — J45.40 MODERATE PERSISTENT ASTHMA WITHOUT COMPLICATION: ICD-10-CM

## 2022-07-21 LAB
EXPIRATORY TIME: NORMAL
FEF 25-75% %PRED-PRE: NORMAL
FEF 25-75% PRED: NORMAL
FEF 25-75%-PRE: NORMAL
FEV1 %PRED-PRE: 81 %
FEV1 PRED: NORMAL
FEV1/FVC %PRED-PRE: NORMAL
FEV1/FVC PRED: NORMAL
FEV1/FVC: 74 %
FEV1: 2.71 L
FVC %PRED-PRE: 82 %
FVC PRED: NORMAL
FVC: 3.66 L
HIV 1+2 AB+HIV1 P24 AG SERPL QL IA: NONREACTIVE
HIV 1/2 RESULT COMMENT: NORMAL
PEF %PRED-PRE: NORMAL
PEF PRED: NORMAL
PEF-PRE: NORMAL

## 2022-07-21 PROCEDURE — 94010 BREATHING CAPACITY TEST: CPT | Performed by: NURSE PRACTITIONER

## 2022-07-21 PROCEDURE — 99204 OFFICE O/P NEW MOD 45 MIN: CPT | Performed by: NURSE PRACTITIONER

## 2022-07-21 RX ORDER — MONTELUKAST SODIUM 10 MG/1
10 TABLET ORAL DAILY
Qty: 30 TABLET | Refills: 3 | Status: SHIPPED | OUTPATIENT
Start: 2022-07-21

## 2022-07-21 RX ORDER — FLUTICASONE PROPIONATE AND SALMETEROL 250; 50 UG/1; UG/1
1 POWDER RESPIRATORY (INHALATION) EVERY 12 HOURS
Qty: 1 EACH | Refills: 11 | Status: SHIPPED | OUTPATIENT
Start: 2022-07-21 | End: 2022-09-30 | Stop reason: SDUPTHER

## 2022-07-21 RX ORDER — ALLOPURINOL 300 MG/1
300 TABLET ORAL DAILY
COMMUNITY

## 2022-07-21 RX ORDER — PANTOPRAZOLE SODIUM 40 MG/1
TABLET, DELAYED RELEASE ORAL
COMMUNITY
Start: 2022-07-07

## 2022-07-21 RX ORDER — ALBUTEROL SULFATE 90 UG/1
AEROSOL, METERED RESPIRATORY (INHALATION)
COMMUNITY
Start: 2022-06-17

## 2022-07-21 RX ORDER — COLCHICINE 0.6 MG/1
0.6 TABLET ORAL PRN
COMMUNITY

## 2022-07-21 ASSESSMENT — PULMONARY FUNCTION TESTS
FEV1/FVC: 74
FEV1_PERCENT_PREDICTED_PRE: 81
FVC: 3.66
FVC_PERCENT_PREDICTED_PRE: 82
FEV1: 2.71

## 2022-07-21 NOTE — H&P (VIEW-ONLY)
[address] Sigmund Shells. [address] [facility], [address] [facility]  [phone]    Patient Name:  Kimberly Lewis      YOB: 1955    Office Visit 7/21/2022    HISTORY OF PRESENT ILLNESS:    Mr. Monique Huston in our clinic today who presents with a chief complaint of   Chief Complaint   Patient presents with    Shortness of Breath   Mr. Jordy Lemus is a 62-year-old very pleasant  male that presents to [facility] as a new patient for concerns of an abnormal CT scan, shortness of breath and cough. Patient states he was in his usual state of health until November where he started having some postnasal drip concerns and cough. He then started having some intermittent wheezing. He does have a mild history of some wheezing as a child and has multiple family members with asthma, so felt it was possibly this. However, in May he had a pacemaker placement and had a CT shortly after which showed some areas concerning for an infection. At this time he was having more wheezing and more coughing. Mild sputum production that was always clear. He is also developed some dyspnea on exertion. At times he is using his albuterol inhaler 2-3 times per day and notices more symptoms with environmental irritants. Lifetime non-smoker. Has lived all over the country, but lived in [place] [address] in early [place]. Currently has a home on [facility] and does spend a lot of time out by the water. No known significant lung history in the past.  Mother and several sisters have significant asthma. Patient has worked in [employer] in the past as well as at [employer]. He does do yard work using different chemicals and fertilizers. He does not wear a mask or gloves.   Hemoptysis:no  Dyspnea/SOB: yes, on exertion  Cough:yes  Sputum:yes, clear to white  Fever:no  Chills:no  Headache:no  Weight Loss:no  PMH Reference Info: Exposure History:  Second Hand Smoke Exposure: No  Birds: No  Asbestos: Yes? TB: No  Hot Tubs/Humidifier: No  Organic/Inorganic Dusts: Yes  Molds: No  Occupation/Hobbies: machine shops, welding  Past Medical History:   Diagnosis Date    Chest pain 3/29/2013    Edema 1/16/2017    Erectile dysfunction 1/16/2017    GERD (gastroesophageal reflux disease) 3/29/2013    Hypothyroidism 3/29/2013      Tobacco Use: Low Risk     Smoking Tobacco Use: Never    Smokeless Tobacco Use: Never     Allergies   Allergen Reactions    Crestor [Rosuvastatin Calcium] Myalgia     Leg pain    Lipitor [Atorvastatin] Myalgia     Leg pain    Amlodipine Swelling    Zetia [Ezetimibe] Myalgia     Leg pain       Current Outpatient Medications   Medication Sig    albuterol sulfate HFA (PROVENTIL;VENTOLIN;PROAIR) 108 (90 Base) MCG/ACT inhaler     allopurinol (ZYLOPRIM) 300 MG tablet Take 300 mg by mouth in the morning. colchicine (COLCRYS) 0.6 MG tablet Take 0.6 mg by mouth as needed    pantoprazole (PROTONIX) 40 MG tablet TAKE ONE TABLET BY MOUTH ONE TIME DAILY    montelukast (SINGULAIR) 10 MG tablet Take 1 tablet by mouth in the morning. fluticasone-salmeterol (ADVAIR DISKUS) 250-50 MCG/ACT AEPB diskus inhaler Inhale 1 puff into the lungs in the morning and 1 puff in the evening. Alirocumab 150 MG/ML SOAJ Inject 150 mg into the skin    aspirin 81 MG EC tablet Take 81 mg by mouth daily    Coenzyme Q10 10 MG CAPS Take by mouth    levothyroxine (SYNTHROID) 100 MCG tablet Take 100 mcg by mouth every morning (before breakfast)    nitroGLYCERIN (NITROSTAT) 0.4 MG SL tablet Place 0.4 mg under the tongue    valsartan-hydroCHLOROthiazide (DIOVAN-HCT) 320-25 MG per tablet Take 1 tablet by mouth daily    buPROPion (WELLBUTRIN XL) 300 MG extended release tablet Take 300 mg by mouth (Patient not taking: Reported on 7/21/2022)     No current facility-administered medications for this visit. ASSESSMENT AND PLAN:  (Medical Decision Making)    Bernabe was seen today for shortness of breath. Diagnoses and all orders for this visit:    SOB (shortness of breath)  -     Spirometry Without Bronchodilator; Future  -     Spirometry Without Bronchodilator  -     HIV 1/2 Ag/Ab, 4TH Generation,W Rflx Confirm; Future  -     Histoplasma Antigen, Urine; Future  -     Blastomyces Antibodies; Future  -     Miscellaneous Sendout; Future  -     Miscellaneous Sendout; Future  --New since November and worsening since May. --He is using albuterol multiple times per day on occasion. Abnormal CT of the chest  -     HIV 1/2 Ag/Ab, 4TH Generation,W Rflx Confirm; Future  -     Histoplasma Antigen, Urine; Future  -     Blastomyces Antibodies; Future  -     Miscellaneous Sendout; Future  -     Miscellaneous Sendout; Future  --Question of chronic and acute changes on CT. Multiple calcified mediastinal lymph nodes as well as granulomas. There are areas of some new groundglass findings with new pulmonary nodules that are through the right lung. --no known history, but DD includes histo, sarcoid? Question if these are old concerns and new inflammatory process seen. Discussed concerns and pt agreed to bronchoscopy for further evaluation. Explained the appearance of an old process as well as possible new concern. Will get cultures, including fungal, CD4:CD8, cytology, histo. Pt will also get labs today  --lung sounds clear on exam    Moderate persistent asthma without complication  --with history, suspect onging SOB could also be compounded by some underlying asthma. Pt states he used his sisters Advair for a short period and noticed he felt better, but then stopped concerned he should be using a medication that was prescribed for him. --will prescribed the Advair; continue albuterol as needed  --spriometry today more consistent with possible restriction.       Environmental and seasonal allergies  --start singulair. Avoid any triggers he can    Other orders  -     montelukast (SINGULAIR) 10 MG tablet; Take 1 tablet by mouth in the morning.  -     fluticasone-salmeterol (ADVAIR DISKUS) 250-50 MCG/ACT AEPB diskus inhaler; Inhale 1 puff into the lungs in the morning and 1 puff in the evening.  -     Case Request      Orders Placed This Encounter   Procedures    Case Request     Order Specific Question:   Case Classification     Answer:   Elective [10]     Order Specific Question:   Diagnosis codes     Answer:   Abnormal CT of the chest [630667]     Order Specific Question:   Requested Date     Answer:   7/25/2022     Order Specific Question:   Requested Time     Answer:   1:00 PM     Order Specific Question:   CPT Code     Answer:   [place] [address] INCL FLUOR GDNCE DX W/CELL [employer] SPX [id number]     Order Specific Question:   CPT Code     Answer:   [place] [address] W/BRNCL ALVEOLAR LAVAGE [[address]]    HIV 1/2 Ag/Ab, 4TH Generation,W Rflx Confirm     Standing Status:   Future     Standing Expiration Date:   7/21/2023    Histoplasma Antigen, Urine     Standing Status:   Future     Standing Expiration Date:   7/21/2023    Blastomyces Antibodies     Standing Status:   Future     Standing Expiration Date:   7/21/2023    Miscellaneous Sendout     Standing Status:   Future     Standing Expiration Date:   7/21/2023     Order Specific Question:   Specify Req. Test (1 Test/Order)     Answer:   histoplasma antigen    Miscellaneous Sendout     Standing Status:   Future     Standing Expiration Date:   7/21/2023     Order Specific Question:   Specify Req. Test (1 Test/Order)     Answer:   blastomyces antibodies    Spirometry Without Bronchodilator     Standing Status:   Future     Number of Occurrences:   1     Standing Expiration Date:   7/21/2023      Orders Placed This Encounter   Medications    montelukast (SINGULAIR) 10 MG tablet     Sig: Take 1 tablet by mouth in the morning.      Dispense:  30 tablet     Refill:  3    fluticasone-salmeterol (ADVAIR DISKUS) 250-50 MCG/ACT AEPB diskus inhaler     Sig: Inhale 1 puff into the lungs in the morning and 1 puff in the evening. Dispense:  1 each     Refill:  2300 Davon Smith, APRN - CNP  Electronically signed    Collaborating physician is Hanna Neal MD    AD  Total time spent was 55 minutes. This time includes chart prep, review of tests/procedures, review of other provider's notes, documentation, and counseling patient regarding disease process and medications. _________________________________________________________________________        REVIEW OF SYSTEMS:  10 point review of systems is negative except as reported in HPI. PHYSICAL EXAM:    Vitals:    07/21/22 0745   BP: 126/74   Pulse: 67   Resp: 18   Temp: 97.4 °F (36.3 °C)   TempSrc: Skin   SpO2: 96%   Weight: 201 lb 8 oz (91.4 kg)   Height: 5' 9\" (1.753 m)      Body mass index is 29.76 kg/m². GENERAL APPEARANCE:  The patient is overweight and in no respiratory distress. HEENT:  PERRL. Conjunctivae unremarkable. Nasal mucosa is without epistaxis, exudate, or polyps. There is no oropharyngeal narrowing. TMs are clear. NECK/LYMPHATIC:  Symmetrical with no JVD. Trachea midline. No thyroid enlargement. No cervical adenopathy. LUNGS:  Normal respiratory effort with symmetrical lung expansion. Breath sounds clear bilaterally on RA. HEART:  There is a regular rate and rhythm. No murmur, rub, or gallop. There is no edema in the lower extremities. ABDOMEN:  Soft and non-tender. No hepatosplenomegaly. Bowel sounds are normal.    SKIN:  There are no rashes, cyanosis, jaundice, or ecchymosis present. EXTREMITIES:  The extremities are unremarkable without clubbing, cyanosis, joint inflammation, degenerative, or ischemic change. MUSCULOSKELETAL:  There is no abnormal tone, muscle atrophy, or abnormal movement present. NEURO:  The patient is alert and oriented.   Memory appears intact and mood is normal.  No gross sensorimotor deficits are present. DIAGNOSTIC TESTS:                                                                                                             LABS: No results for input(s): HGB, HCT, TSH, BNPNT in the last 72 hours. CXR  5/2022      3/2013         Screening chest CT: No results found for this or any previous visit. CT of chest without contrast:     7/6/22      CT of chest with contrast:  No valid procedures specified. CT of chest PE protocol:  No results found for this or any previous visit. HRCT:  No results found for this or any previous visit. PET/CT: No valid procedures specified. Exercise oximetry:  Resting O2 saturations were 99% on RA. While walking, lowest O2 reading was 97% and highest HR was 84. This was a negative exercise oximetry test.    FeNo:     Spirometry:   Date:    7/21/22     FVC    3.66-82%     FEV1    2.71-81%     FEV1/FVC    74%     FEF 25-75%    2.29-85%     Bronchodilator Response         TLC         RV         DLCO           Echo:3/2022       Left Ventricle: Left ventricle size is normal. Normal wall thickness. Normal wall motion. Normal left ventricular systolic function with a visually estimated EF of 60 - 65%. Normal diastolic function. Mitral Valve: Mild transvalvular regurgitation.

## 2022-07-21 NOTE — PROGRESS NOTES
[address], [address] [facility]  [phone]    Patient Name:  Rosemary Pollard      YOB: 1955    Office Visit 7/21/2022    HISTORY OF PRESENT ILLNESS:    Mr. Luis Yen in our clinic today who presents with a chief complaint of   Chief Complaint   Patient presents with    Shortness of Breath   Mr. Yesenia Sahu is a 79-year-old very pleasant  male that presents to [facility] pulmonary as a new patient for concerns of an abnormal CT scan, shortness of breath and cough. Patient states he was in his usual state of health until November where he started having some postnasal drip concerns and cough. He then started having some intermittent wheezing. He does have a mild history of some wheezing as a child and has multiple family members with asthma, so felt it was possibly this. However, in May he had a pacemaker placement and had a CT shortly after which showed some areas concerning for an infection. At this time he was having more wheezing and more coughing. Mild sputum production that was always clear. He is also developed some dyspnea on exertion. At times he is using his albuterol inhaler 2-3 times per day and notices more symptoms with environmental irritants. Lifetime non-smoker. Has lived all over the country, but lived in [place] [address] in early [place]. Currently has a home on [address] and does spend a lot of time out by the water. No known significant lung history in the past.  Mother and several sisters have significant asthma. Patient has worked in [employer] in the past as well as at [employer]. He does do yard work using different chemicals and fertilizers. He does not wear a mask or gloves.   Hemoptysis:no  Dyspnea/SOB: yes, on exertion  Cough:yes  Sputum:yes, clear to white  Fever:no  Chills:no  Headache:no  Weight Loss:no  PMH Reference Info: Exposure History:  Second Hand Smoke Exposure: No  Birds: No  Asbestos: Yes? TB: No  Hot Tubs/Humidifier: No  Organic/Inorganic Dusts: Yes  Molds: No  Occupation/Hobbies: machine shops, welding  Past Medical History:   Diagnosis Date    Chest pain 3/29/2013    Edema 1/16/2017    Erectile dysfunction 1/16/2017    GERD (gastroesophageal reflux disease) 3/29/2013    Hypothyroidism 3/29/2013      Tobacco Use: Low Risk     Smoking Tobacco Use: Never    Smokeless Tobacco Use: Never     Allergies   Allergen Reactions    Crestor [Rosuvastatin Calcium] Myalgia     Leg pain    Lipitor [Atorvastatin] Myalgia     Leg pain    Amlodipine Swelling    Zetia [Ezetimibe] Myalgia     Leg pain       Current Outpatient Medications   Medication Sig    albuterol sulfate HFA (PROVENTIL;VENTOLIN;PROAIR) 108 (90 Base) MCG/ACT inhaler     allopurinol (ZYLOPRIM) 300 MG tablet Take 300 mg by mouth in the morning. colchicine (COLCRYS) 0.6 MG tablet Take 0.6 mg by mouth as needed    pantoprazole (PROTONIX) 40 MG tablet TAKE ONE TABLET BY MOUTH ONE TIME DAILY    montelukast (SINGULAIR) 10 MG tablet Take 1 tablet by mouth in the morning. fluticasone-salmeterol (ADVAIR DISKUS) 250-50 MCG/ACT AEPB diskus inhaler Inhale 1 puff into the lungs in the morning and 1 puff in the evening. Alirocumab 150 MG/ML SOAJ Inject 150 mg into the skin    aspirin 81 MG EC tablet Take 81 mg by mouth daily    Coenzyme Q10 10 MG CAPS Take by mouth    levothyroxine (SYNTHROID) 100 MCG tablet Take 100 mcg by mouth every morning (before breakfast)    nitroGLYCERIN (NITROSTAT) 0.4 MG SL tablet Place 0.4 mg under the tongue    valsartan-hydroCHLOROthiazide (DIOVAN-HCT) 320-25 MG per tablet Take 1 tablet by mouth daily    buPROPion (WELLBUTRIN XL) 300 MG extended release tablet Take 300 mg by mouth (Patient not taking: Reported on 7/21/2022)     No current facility-administered medications for this visit. ASSESSMENT AND PLAN:  (Medical Decision Making)    Bernabe was seen today for shortness of breath. Diagnoses and all orders for this visit:    SOB (shortness of breath)  -     Spirometry Without Bronchodilator; Future  -     Spirometry Without Bronchodilator  -     HIV 1/2 Ag/Ab, 4TH Generation,W Rflx Confirm; Future  -     Histoplasma Antigen, Urine; Future  -     Blastomyces Antibodies; Future  -     Miscellaneous Sendout; Future  -     Miscellaneous Sendout; Future  --New since November and worsening since May. --He is using albuterol multiple times per day on occasion. Abnormal CT of the chest  -     HIV 1/2 Ag/Ab, 4TH Generation,W Rflx Confirm; Future  -     Histoplasma Antigen, Urine; Future  -     Blastomyces Antibodies; Future  -     Miscellaneous Sendout; Future  -     Miscellaneous Sendout; Future  --Question of chronic and acute changes on CT. Multiple calcified mediastinal lymph nodes as well as granulomas. There are areas of some new groundglass findings with new pulmonary nodules that are through the right lung. --no known history, but DD includes histo, sarcoid? Question if these are old concerns and new inflammatory process seen. Discussed concerns and pt agreed to bronchoscopy for further evaluation. Explained the appearance of an old process as well as possible new concern. Will get cultures, including fungal, CD4:CD8, cytology, histo. Pt will also get labs today  --lung sounds clear on exam    Moderate persistent asthma without complication  --with history, suspect onging SOB could also be compounded by some underlying asthma. Pt states he used his sisters Advair for a short period and noticed he felt better, but then stopped concerned he should be using a medication that was prescribed for him. --will prescribed the Advair; continue albuterol as needed  --spriometry today more consistent with possible restriction.       Environmental and seasonal allergies  --start singulair. Avoid any triggers he can    Other orders  -     montelukast (SINGULAIR) 10 MG tablet; Take 1 tablet by mouth in the morning.  -     fluticasone-salmeterol (ADVAIR DISKUS) 250-50 MCG/ACT AEPB diskus inhaler; Inhale 1 puff into the lungs in the morning and 1 puff in the evening.  -     Case Request      Orders Placed This Encounter   Procedures    Case Request     Order Specific Question:   Case Classification     Answer:   Elective [10]     Order Specific Question:   Diagnosis codes     Answer:   Abnormal CT of the chest [[id number]]     Order Specific Question:   Requested Date     Answer:   7/25/2022     Order Specific Question:   Requested Time     Answer:   1:00 PM     Order Specific Question:   CPT Code     Answer:   [place] [address] INCL FLUOR GDNCE DX W/CELL [employer] SPX [id number]     Order Specific Question:   CPT Code     Answer:   [place] [address] W/BRNCL ALVEOLAR [place]AVAGE [[address]]    HIV 1/2 Ag/Ab, 4TH Generation,W Rflx Confirm     Standing Status:   Future     Standing Expiration Date:   7/21/2023    Histoplasma Antigen, Urine     Standing Status:   Future     Standing Expiration Date:   7/21/2023    Blastomyces Antibodies     Standing Status:   Future     Standing Expiration Date:   7/21/2023    Miscellaneous Sendout     Standing Status:   Future     Standing Expiration Date:   7/21/2023     Order Specific Question:   Specify Req. Test (1 Test/Order)     Answer:   histoplasma antigen    Miscellaneous Sendout     Standing Status:   Future     Standing Expiration Date:   7/21/2023     Order Specific Question:   Specify Req. Test (1 Test/Order)     Answer:   blastomyces antibodies    Spirometry Without Bronchodilator     Standing Status:   Future     Number of Occurrences:   1     Standing Expiration Date:   7/21/2023      Orders Placed This Encounter   Medications    montelukast (SINGULAIR) 10 MG tablet     Sig: Take 1 tablet by mouth in the morning.      Dispense:  30 tablet     Refill:  3    fluticasone-salmeterol (ADVAIR DISKUS) 250-50 MCG/ACT AEPB diskus inhaler     Sig: Inhale 1 puff into the lungs in the morning and 1 puff in the evening. Dispense:  1 each     Refill:  2300 Davon Smith, APRN - CNP  Electronically signed    Collaborating physician is Jersey Boyer MD    AD  Total time spent was 55 minutes. This time includes chart prep, review of tests/procedures, review of other provider's notes, documentation, and counseling patient regarding disease process and medications. _________________________________________________________________________        REVIEW OF SYSTEMS:  10 point review of systems is negative except as reported in HPI. PHYSICAL EXAM:    Vitals:    07/21/22 0745   BP: 126/74   Pulse: 67   Resp: 18   Temp: 97.4 °F (36.3 °C)   TempSrc: Skin   SpO2: 96%   Weight: 201 lb 8 oz (91.4 kg)   Height: 5' 9\" (1.753 m)      Body mass index is 29.76 kg/m². GENERAL APPEARANCE:  The patient is overweight and in no respiratory distress. HEENT:  PERRL. Conjunctivae unremarkable. Nasal mucosa is without epistaxis, exudate, or polyps. There is no oropharyngeal narrowing. TMs are clear. NECK/LYMPHATIC:  Symmetrical with no JVD. Trachea midline. No thyroid enlargement. No cervical adenopathy. LUNGS:  Normal respiratory effort with symmetrical lung expansion. Breath sounds clear bilaterally on RA. HEART:  There is a regular rate and rhythm. No murmur, rub, or gallop. There is no edema in the lower extremities. ABDOMEN:  Soft and non-tender. No hepatosplenomegaly. Bowel sounds are normal.    SKIN:  There are no rashes, cyanosis, jaundice, or ecchymosis present. EXTREMITIES:  The extremities are unremarkable without clubbing, cyanosis, joint inflammation, degenerative, or ischemic change. MUSCULOSKELETAL:  There is no abnormal tone, muscle atrophy, or abnormal movement present. NEURO:  The patient is alert and oriented.   Memory appears intact and mood is normal.  No gross sensorimotor deficits are present. DIAGNOSTIC TESTS:                                                                                                             LABS: No results for input(s): HGB, HCT, TSH, BNPNT in the last 72 hours. CXR  5/2022      3/2013         Screening chest CT: No results found for this or any previous visit. CT of chest without contrast:     7/6/22      CT of chest with contrast:  No valid procedures specified. CT of chest PE protocol:  No results found for this or any previous visit. HRCT:  No results found for this or any previous visit. PET/CT: No valid procedures specified. Exercise oximetry:  Resting O2 saturations were 99% on RA. While walking, lowest O2 reading was 97% and highest HR was 84. This was a negative exercise oximetry test.    FeNo:     Spirometry:   Date:    7/21/22     FVC    3.66-82%     FEV1    2.71-81%     FEV1/FVC    74%     FEF 25-75%    2.29-85%     Bronchodilator Response         TLC         RV         DLCO           Echo:3/2022       Left Ventricle: Left ventricle size is normal. Normal wall thickness. Normal wall motion. Normal left ventricular systolic function with a visually estimated EF of 60 - 65%. Normal diastolic function. Mitral Valve: Mild transvalvular regurgitation.

## 2022-07-22 NOTE — PROGRESS NOTES
Patient verified name, , and procedure. Type: 1a; abbreviated assessment per anesthesia guidelines  Labs per surgeon: none  Labs per anesthesia: none      Instructed pt that they will be notified by the doctor office for time of arrival to [facility] lab. Follow diet and prep instructions per office. Bath or shower the night before and the am of surgery with antibacterial soap. No lotions, oils, powders, cologne on skin. No make up, eye make up or jewelry. Wear loose fitting comfortable, clean clothing. Must have adult present in building the entire time . Medications for the day of procedure Albuterol, Allopurinol, Asprin, Advair, Levothyroxine, Singulair, Protonix. The following discharge instructions reviewed with patient: medication given during procedure may cause drowsiness for several hours, therefore, do not drive or operate machinery for remainder of the day, no alcohol on the day of your procedure, resume regular diet and activity unless otherwise directed, for mild sore throat you may use Cepacol throat lozenges or warm salt water gargles as needed, call your physician for any problems or questions. Patient verbalizes understanding.

## 2022-07-24 LAB — B DERMAT AB TITR SER: NEGATIVE {TITER}

## 2022-07-25 ENCOUNTER — HOSPITAL ENCOUNTER (OUTPATIENT)
Age: 67
Setting detail: OUTPATIENT SURGERY
Discharge: HOME | End: 2022-07-25
Attending: INTERNAL MEDICINE | Admitting: INTERNAL MEDICINE
Payer: COMMERCIAL

## 2022-07-25 VITALS
DIASTOLIC BLOOD PRESSURE: 72 MMHG | HEART RATE: 65 BPM | RESPIRATION RATE: 18 BRPM | HEIGHT: 69 IN | OXYGEN SATURATION: 98 % | WEIGHT: 195 LBS | SYSTOLIC BLOOD PRESSURE: 139 MMHG | BODY MASS INDEX: 28.88 KG/M2 | TEMPERATURE: 98.2 F

## 2022-07-25 DIAGNOSIS — R93.89 ABNORMAL CT OF THE CHEST: ICD-10-CM

## 2022-07-25 LAB
DIFFERENTIAL: NORMAL
EOSINOPHIL NFR BRONCH MANUAL: 0 %
HISTOPLASMA AG: <0.5 NG/ML
LYMPHOCYTES NFR BRONCH MANUAL: 6 %
MACROPHAGES NFR BRONCH MANUAL: 0 %
NEUTROPHILS NFR BRONCH MANUAL: 94 %

## 2022-07-25 PROCEDURE — 99152 MOD SED SAME PHYS/QHP 5/>YRS: CPT | Performed by: INTERNAL MEDICINE

## 2022-07-25 PROCEDURE — 86356 MONONUCLEAR CELL ANTIGEN: CPT

## 2022-07-25 PROCEDURE — 3609027000 HC BRONCHOSCOPY: Performed by: INTERNAL MEDICINE

## 2022-07-25 PROCEDURE — 6360000002 HC RX W HCPCS: Performed by: INTERNAL MEDICINE

## 2022-07-25 PROCEDURE — 87581 M.PNEUMON DNA AMP PROBE: CPT

## 2022-07-25 PROCEDURE — 88305 TISSUE EXAM BY PATHOLOGIST: CPT

## 2022-07-25 PROCEDURE — 2709999900 HC NON-CHARGEABLE SUPPLY: Performed by: INTERNAL MEDICINE

## 2022-07-25 PROCEDURE — 87070 CULTURE OTHR SPECIMN AEROBIC: CPT

## 2022-07-25 PROCEDURE — 2580000003 HC RX 258: Performed by: INTERNAL MEDICINE

## 2022-07-25 PROCEDURE — 87798 DETECT AGENT NOS DNA AMP: CPT

## 2022-07-25 PROCEDURE — 6370000000 HC RX 637 (ALT 250 FOR IP): Performed by: INTERNAL MEDICINE

## 2022-07-25 PROCEDURE — 88112 CYTOPATH CELL ENHANCE TECH: CPT

## 2022-07-25 PROCEDURE — 31624 DX BRONCHOSCOPE/LAVAGE: CPT | Performed by: INTERNAL MEDICINE

## 2022-07-25 PROCEDURE — 87102 FUNGUS ISOLATION CULTURE: CPT

## 2022-07-25 PROCEDURE — 87252 VIRUS INOCULATION TISSUE: CPT

## 2022-07-25 PROCEDURE — 7100000010 HC PHASE II RECOVERY - FIRST 15 MIN: Performed by: INTERNAL MEDICINE

## 2022-07-25 PROCEDURE — 87116 MYCOBACTERIA CULTURE: CPT

## 2022-07-25 PROCEDURE — 7100000011 HC PHASE II RECOVERY - ADDTL 15 MIN: Performed by: INTERNAL MEDICINE

## 2022-07-25 PROCEDURE — 89051 BODY FLUID CELL COUNT: CPT

## 2022-07-25 PROCEDURE — 87799 DETECT AGENT NOS DNA QUANT: CPT

## 2022-07-25 RX ORDER — FLUMAZENIL 0.1 MG/ML
0.2 INJECTION, SOLUTION INTRAVENOUS PRN
Status: DISCONTINUED | OUTPATIENT
Start: 2022-07-25 | End: 2022-07-25 | Stop reason: HOSPADM

## 2022-07-25 RX ORDER — NALOXONE HYDROCHLORIDE 0.4 MG/ML
0.4 INJECTION, SOLUTION INTRAMUSCULAR; INTRAVENOUS; SUBCUTANEOUS PRN
Status: DISCONTINUED | OUTPATIENT
Start: 2022-07-25 | End: 2022-07-25 | Stop reason: HOSPADM

## 2022-07-25 RX ORDER — SODIUM CHLORIDE, SODIUM LACTATE, POTASSIUM CHLORIDE, CALCIUM CHLORIDE 600; 310; 30; 20 MG/100ML; MG/100ML; MG/100ML; MG/100ML
INJECTION, SOLUTION INTRAVENOUS CONTINUOUS
Status: DISCONTINUED | OUTPATIENT
Start: 2022-07-25 | End: 2022-07-25 | Stop reason: HOSPADM

## 2022-07-25 RX ORDER — MIDAZOLAM HYDROCHLORIDE 2 MG/2ML
2 INJECTION, SOLUTION INTRAMUSCULAR; INTRAVENOUS PRN
Status: DISCONTINUED | OUTPATIENT
Start: 2022-07-25 | End: 2022-07-25 | Stop reason: HOSPADM

## 2022-07-25 RX ORDER — LIDOCAINE HYDROCHLORIDE 40 MG/ML
SOLUTION TOPICAL ONCE
Status: COMPLETED | OUTPATIENT
Start: 2022-07-25 | End: 2022-07-25

## 2022-07-25 RX ORDER — LIDOCAINE HYDROCHLORIDE 20 MG/ML
JELLY TOPICAL PRN
Status: DISCONTINUED | OUTPATIENT
Start: 2022-07-25 | End: 2022-07-25 | Stop reason: HOSPADM

## 2022-07-25 RX ORDER — 0.9 % SODIUM CHLORIDE 0.9 %
500 INTRAVENOUS SOLUTION INTRAVENOUS ONCE
Status: DISCONTINUED | OUTPATIENT
Start: 2022-07-25 | End: 2022-07-25 | Stop reason: HOSPADM

## 2022-07-25 RX ADMIN — SODIUM CHLORIDE, POTASSIUM CHLORIDE, SODIUM LACTATE AND CALCIUM CHLORIDE: 600; 310; 30; 20 INJECTION, SOLUTION INTRAVENOUS at 12:18

## 2022-07-25 RX ADMIN — FENTANYL CITRATE 50 MCG: 50 INJECTION, SOLUTION INTRAMUSCULAR; INTRAVENOUS at 13:09

## 2022-07-25 RX ADMIN — MIDAZOLAM 1 MG: 1 INJECTION INTRAMUSCULAR; INTRAVENOUS at 13:16

## 2022-07-25 RX ADMIN — FENTANYL CITRATE 50 MCG: 50 INJECTION, SOLUTION INTRAMUSCULAR; INTRAVENOUS at 13:12

## 2022-07-25 RX ADMIN — FENTANYL CITRATE 25 MCG: 50 INJECTION, SOLUTION INTRAMUSCULAR; INTRAVENOUS at 13:18

## 2022-07-25 RX ADMIN — LIDOCAINE HYDROCHLORIDE: 20 JELLY TOPICAL at 13:02

## 2022-07-25 RX ADMIN — LIDOCAINE HYDROCHLORIDE: 40 SOLUTION TOPICAL at 13:02

## 2022-07-25 RX ADMIN — FENTANYL CITRATE 50 MCG: 50 INJECTION, SOLUTION INTRAMUSCULAR; INTRAVENOUS at 13:15

## 2022-07-25 RX ADMIN — MIDAZOLAM 2 MG: 1 INJECTION INTRAMUSCULAR; INTRAVENOUS at 13:09

## 2022-07-25 ASSESSMENT — PAIN - FUNCTIONAL ASSESSMENT
PAIN_FUNCTIONAL_ASSESSMENT: NONE - DENIES PAIN

## 2022-07-25 NOTE — PROCEDURES
Procedure: Bronchoscopy    Time Out Done -- Correct Patient Identified. Everyone Agrees. Anesthesia- 3 mg Versed, 175 mcg Fentanyl  Indication- as per above  Post Procedure diagnosis : as per above  Instrument-  Olympus bronchosope     Procedure   The flexible fiberoptic bronchoscope was introduced through the mouth with bite block. Advanced to vocal cords and noted with narrow anatomy and function. Advanced down trachea to angelita. Angelita sharp without any pathology noted. Advanced to left main stem bronchus and down to ROBIN and LLL. Noted normal anatomy and segments. Scope then passed to Right main stem bronchus. Noted normal RUL anatomy. Then advanced to bronchus intermedius and noted RML and RLL with normal segments. BAL done of the RUL with 180 ml and 50 ml return of clear fluid    Patient did have scant  secretions that needed to be cleared with normal saline     Patient tolerated procedure well, no complications. EBL -- scant suction trauma. Please order the following:    Gram stain and culture  Fungal culture  AFB   Cell count with differential  CD4/CD8  Cytology       Pneumocystis - PCR  Aspergillus - PCR  Atypical pneumonia panel - PCR     Patient also with SUSANNE and not using CPAP. In the future if agreeable can f/u in the sleep center as well. Will need to get old study if possible. He does not recall where it was done.         Salbador Dean MD

## 2022-07-25 NOTE — PROGRESS NOTES
1340-Discharge instructions were reviewed with patient and pts spouse. Patient and spouse verbalized understanding, and has no questions at this time. 1420-To lobby via wheelchair accompanied by staff. Discharged to home in good condition via private vehicle.

## 2022-07-25 NOTE — DISCHARGE INSTRUCTIONS
RESPIRATORY CARE - BRONCHOSCOPY - DISCHARGE INSTRUCTIONS      You received a lot of numbing medication for your throat and nose, and you also received medication to make you sleepy during your procedure. Because of this and because the bronchoscopy may have irritated your airways, we ask that you follow these directions:    1. Do not eat or drink until  2:30 pm .   After that, you may have what you please. You may want to try some liquids first, because your throat may be a little sore. 2. Medication may cause drowsiness for several hours, therefore:  Do not drive or operate machinery for remainder of the day. No alcohol today  Do not make any important or legal decisions for 24 hours  Do not sign any legal documents for 24 hours    3. You may cough up more mucus than usual and you may see some blood, but this is expected and should subside by the following day. 4. If severe throat irritation, coughing, or bleeding continue, call your doctor. 5.         If you run a fever greater than 102, call [facility] at [phone]. 6.         Dr. Sallie Crowe has asked that you:                A. Call the doctor's office at [phone] for any questions or concerns. B. See him in the office as scheduled    Discharge Medications:  Current Discharge Medication List        CONTINUE these medications which have NOT CHANGED    Details   albuterol sulfate HFA (PROVENTIL;VENTOLIN;PROAIR) 108 (90 Base) MCG/ACT inhaler       allopurinol (ZYLOPRIM) 300 MG tablet Take 300 mg by mouth in the morning. colchicine (COLCRYS) 0.6 MG tablet Take 0.6 mg by mouth as needed      pantoprazole (PROTONIX) 40 MG tablet TAKE ONE TABLET BY MOUTH ONE TIME DAILY      montelukast (SINGULAIR) 10 MG tablet Take 1 tablet by mouth in the morning.   Qty: 30 tablet, Refills: 3      fluticasone-salmeterol (ADVAIR DISKUS) 250-50 MCG/ACT AEPB diskus inhaler Inhale 1 puff into the lungs in the morning and 1 puff in the evening. Qty: 1 each, Refills: 11      Alirocumab 150 MG/ML SOAJ Inject 150 mg into the skin      aspirin 81 MG EC tablet Take 81 mg by mouth daily      Coenzyme Q10 10 MG CAPS Take by mouth      levothyroxine (SYNTHROID) 100 MCG tablet Take 100 mcg by mouth every morning (before breakfast)      nitroGLYCERIN (NITROSTAT) 0.4 MG SL tablet Place 0.4 mg under the tongue      valsartan-hydroCHLOROthiazide (DIOVAN-HCT) 320-25 MG per tablet Take 1 tablet by mouth daily              Any additional instructions:  Also follow up with Dr. Clara Bell related to your sleep apnea.      Instructions given to Rosemary Pollard and other family members

## 2022-07-25 NOTE — PRE SEDATION
Sedation Pre-Procedure Note    Patient Name: Al Villalobos   YOB: 1955  Room/Bed: ENDO/PL  Medical Record Number: [id number]  Date: 7/25/2022   Time: 1:04 PM       Indication:  lung lesions    Consent: I have discussed with the patient and/or the patient representative the indication, alternatives, and the possible risks and/or complications of the planned procedure and the anesthesia methods. The patient and/or patient representative appear to understand and agree to proceed. Vital Signs:   Vitals:    07/25/22 1158   BP: (!) 157/80   Pulse: 63   Resp: 14   Temp: 98.2 °F (36.8 °C)   SpO2: 94%       Past Medical History:   has a past medical history of Asthma, Chest pain, Edema, Erectile dysfunction, GERD (gastroesophageal reflux disease), and Hypothyroidism. Past Surgical History:   has a past surgical history that includes Cardiac catheterization (03/29/2013) and Pacemaker insertion. Medications:   Scheduled Meds:    sodium chloride  500 mL IntraVENous Once     Continuous Infusions:    lactated ringers 100 mL/hr at 07/25/22 1218     PRN Meds: midazolam PF, fentaNYL, flumazenil, naloxone, lidocaine  Home Meds:   Prior to Admission medications    Medication Sig Start Date End Date Taking? Authorizing Provider   albuterol sulfate HFA (PROVENTIL;VENTOLIN;PROAIR) 108 (90 Base) MCG/ACT inhaler  6/17/22   Historical Provider, MD   allopurinol (ZYLOPRIM) 300 MG tablet Take 300 mg by mouth in the morning.     Historical Provider, MD   colchicine (COLCRYS) 0.6 MG tablet Take 0.6 mg by mouth as needed  Patient not taking: Reported on 7/25/2022    Historical Provider, MD   pantoprazole (PROTONIX) 40 MG tablet TAKE ONE TABLET BY MOUTH ONE TIME DAILY 7/7/22   Historical Provider, MD   montelukast (SINGULAIR) 10 MG tablet Take 1 tablet by mouth in the morning. 7/21/22   Lenin Gaona, APRN - KATHY   fluticasone-salmeterol (ADVAIR DISKUS) 250-50 MCG/ACT AEPB diskus inhaler Inhale 1 puff into the lungs in the morning and 1 puff in the evening. 7/21/22   Brendan Gaona, APRN - CNP   Alirocumab 150 MG/ML SOAJ Inject 150 mg into the skin 3/31/22   Ar Automatic Reconciliation   aspirin 81 MG EC tablet Take 81 mg by mouth daily 3/28/22   Ar Automatic Reconciliation   Coenzyme Q10 10 MG CAPS Take by mouth    Ar Automatic Reconciliation   levothyroxine (SYNTHROID) 100 MCG tablet Take 100 mcg by mouth every morning (before breakfast)    Ar Automatic Reconciliation   nitroGLYCERIN (NITROSTAT) 0.4 MG SL tablet Place 0.4 mg under the tongue  Patient not taking: Reported on 7/25/2022 3/30/13   Ar Automatic Reconciliation   valsartan-hydroCHLOROthiazide (DIOVAN-HCT) 320-25 MG per tablet Take 1 tablet by mouth daily 1/16/17   Ar Automatic Reconciliation     Coumadin Use Last 7 Days:  no  Antiplatelet drug therapy use last 7 days: yes - ASA  Other anticoagulant use last 7 days: no  Additional Medication Information:  noted      Pre-Sedation Documentation and Exam:   Vital signs have been reviewed (see flow sheet for vitals). I have reviewed the patient's history and review of systems.     Mallampati Airway Assessment:  Mallampati Class III - (soft palate & base of uvula are visible)    Prior History of Anesthesia Complications:   none    ASA Classification:  Class 2 - A normal healthy patient with mild systemic disease    Sedation/ Anesthesia Plan:   intravenous sedation    Medications Planned:   midazolam (Versed) intravenously and fentanyl intravenously    Patient is an appropriate candidate for plan of sedation: yes    Electronically signed by Greg Solano MD on 7/25/2022 at 1:04 PM

## 2022-07-25 NOTE — INTERVAL H&P NOTE
Update History & Physical    The patient's History and Physical of July 25, chart was reviewed with the patient and I examined the patient. There was no change. The surgical site was confirmed by the patient and me. Plan: The risks, benefits, expected outcome, and alternative to the recommended procedure have been discussed with the patient. Patient understands and wants to proceed with the procedure.      Electronically signed by Xenia Scales MD on 7/25/2022 at 1:04 PM

## 2022-07-26 LAB
CYTOLOGY-NON GYN: NORMAL
SPECIMEN SOURCE: NORMAL

## 2022-07-27 LAB
BACTERIA SPEC CULT: NORMAL
GRAM STN SPEC: NORMAL
SERVICE CMNT-IMP: NORMAL

## 2022-07-28 ENCOUNTER — TELEPHONE (OUTPATIENT)
Dept: PULMONOLOGY | Age: 67
End: 2022-07-28

## 2022-07-28 LAB
A FUMIGATUS DNA SPEC QL NAA+PROBE: NOT DETECTED
A TERREUS DNA SPEC QL NAA+PROBE: NOT DETECTED
ASPERGILLUS DNA SPEC QL NAA+PROBE: NOT DETECTED
CD3 CELLS # SPEC: 85 %
CD3+CD4+ CELLS # BLD: 36 %
CD4/CD8 RATIO: 0.78 RATIO
CD8: 46 %
SPECIMEN SOURCE: NORMAL

## 2022-07-28 NOTE — TELEPHONE ENCOUNTER
Left patient message via voicemail to please give me a call as soon as they are available. // Faith OCONNELL

## 2022-07-28 NOTE — TELEPHONE ENCOUNTER
----- Message from Eliezer Briggs MD sent at 7/27/2022  4:05 PM EDT -----  Some studies are back and regular culture is negative and no cancer cells. Other studies are pending and will call as they become available. Please let the patient know.     Eliezer Briggs MD

## 2022-07-28 NOTE — TELEPHONE ENCOUNTER
Spoke with the patient in regards to their Lab results, explained per Dr. Han Schneider that the culture is negative with no cancer cells. I also explained that the we are still waiting on the other studies to come back and as soon as we receive them we will give him a call. Patient understood the results and did not have any further questions or concerns at this time. // Radha OCONNELL

## 2022-07-29 LAB
C PNEUMONIAE PCR: NOT DETECTED
MYCOPLASMA PNEUMONIAE PCR: NOT DETECTED

## 2022-08-03 LAB
SPECIMEN SOURCE: NORMAL
VIRUS SPEC CULT: NORMAL

## 2022-08-05 LAB — VARICELLA-ZOSTER, PCR: NORMAL

## 2022-08-19 ENCOUNTER — TELEPHONE (OUTPATIENT)
Dept: CARDIOLOGY CLINIC | Age: 67
End: 2022-08-19

## 2022-08-19 NOTE — TELEPHONE ENCOUNTER
Pt reports for a few weeks feeling dizzy when standing up, either with arising slowly or at a normal pace. Had pacemaker chest last week and was told pacemaker was 100% dependent. States he feels more short of breath with light exertion. Patient able to speak in full sentences on phone without sob. States BP and pulse have been OK. Advised hydration to help with dizziness when arising. Appointment scheduled for patient to be evaluated in office by Dr. Alvarez Randolph.

## 2022-08-26 ENCOUNTER — OFFICE VISIT (OUTPATIENT)
Dept: CARDIOLOGY CLINIC | Age: 67
End: 2022-08-26
Payer: COMMERCIAL

## 2022-08-26 VITALS
WEIGHT: 199.4 LBS | SYSTOLIC BLOOD PRESSURE: 108 MMHG | HEIGHT: 69 IN | HEART RATE: 72 BPM | DIASTOLIC BLOOD PRESSURE: 68 MMHG | BODY MASS INDEX: 29.53 KG/M2

## 2022-08-26 DIAGNOSIS — E78.5 DYSLIPIDEMIA: ICD-10-CM

## 2022-08-26 DIAGNOSIS — Z95.0 PACEMAKER: ICD-10-CM

## 2022-08-26 DIAGNOSIS — I25.119 ATHEROSCLEROSIS OF NATIVE CORONARY ARTERY OF NATIVE HEART WITH ANGINA PECTORIS (HCC): ICD-10-CM

## 2022-08-26 DIAGNOSIS — R06.09 DYSPNEA ON EXERTION: ICD-10-CM

## 2022-08-26 DIAGNOSIS — I10 PRIMARY HYPERTENSION: Primary | ICD-10-CM

## 2022-08-26 PROCEDURE — 99214 OFFICE O/P EST MOD 30 MIN: CPT | Performed by: INTERNAL MEDICINE

## 2022-08-26 RX ORDER — VALSARTAN AND HYDROCHLOROTHIAZIDE 160; 12.5 MG/1; MG/1
1 TABLET, FILM COATED ORAL DAILY
Qty: 30 TABLET | Refills: 3 | Status: SHIPPED | OUTPATIENT
Start: 2022-08-26

## 2022-08-26 NOTE — PROGRESS NOTES
[address], [address]  PHONE: [phone]    Ramon Monroy  1955      SUBJECTIVE:   Ramon Monroy is a 61 y.o. male seen for a follow up visit regarding the following:     Chief Complaint   Patient presents with    Coronary Artery Disease    Shortness of Breath    Dizziness    Other     Pacemaker questions       HPI:    Ramon Monroy presents for routine follow up for known Coronary Artery Disease. Multiple issues addressed as outlined below:     Coronary Artery Disease: Patient notes worsening dyspnea on exertion and exertional fatigue. Does not exercise on a routine basis. No clear chest discomfort. Hypertension:  Getting dizzy with standing. Bending over gets dizziness. Blood pressure low today but typically 1 20-1 30 at home. Hyperlipidemia:   Currently well controlled on Praluent     Pacemaker:  RV pacing 100%. Normal device funciton. Past Medical History, Past Surgical History, Family history, Social History, and Medications were all reviewed with the patient today and updated as necessary.            Current Outpatient Medications:     valsartan-hydroCHLOROthiazide (DIOVAN HCT) 160-12.5 MG per tablet, Take 1 tablet by mouth daily, Disp: 30 tablet, Rfl: 3    albuterol sulfate HFA (PROVENTIL;VENTOLIN;PROAIR) 108 (90 Base) MCG/ACT inhaler, , Disp: , Rfl:     allopurinol (ZYLOPRIM) 300 MG tablet, Take 300 mg by mouth in the morning., Disp: , Rfl:     colchicine (COLCRYS) 0.6 MG tablet, Take 0.6 mg by mouth as needed, Disp: , Rfl:     pantoprazole (PROTONIX) 40 MG tablet, TAKE ONE TABLET BY MOUTH ONE TIME DAILY, Disp: , Rfl:     montelukast (SINGULAIR) 10 MG tablet, Take 1 tablet by mouth in the morning., Disp: 30 tablet, Rfl: 3    fluticasone-salmeterol (ADVAIR DISKUS) 250-50 MCG/ACT AEPB diskus inhaler, Inhale 1 puff into the lungs in the morning and 1 puff in the evening., Disp: 1 each, Rfl: 11    Alirocumab 150 MG/ML SOAJ, Inject 150 mg into the skin, Disp: , Rfl:     aspirin 81 MG EC tablet, Take 81 mg by mouth daily, Disp: , Rfl:     Coenzyme Q10 10 MG CAPS, Take by mouth, Disp: , Rfl:     levothyroxine (SYNTHROID) 100 MCG tablet, Take 100 mcg by mouth every morning (before breakfast), Disp: , Rfl:     nitroGLYCERIN (NITROSTAT) 0.4 MG SL tablet, Place 0.4 mg under the tongue, Disp: , Rfl:      Allergies   Allergen Reactions    Crestor [Rosuvastatin Calcium] Myalgia     Leg pain    Lipitor [Atorvastatin] Myalgia     Leg pain    Amlodipine Swelling    Zetia [Ezetimibe] Myalgia     Leg pain          Patient Active Problem List    Diagnosis Date Noted    Abnormal CT of the chest 07/21/2022     Priority: Low     Added automatically from request for surgery [id number]      Pacemaker 05/18/2022     Priority: Low     1. Dual chamber pacemaker (BSC) placed due to heart block and syncope   (5/2/22)        Syncope 05/01/2022     Priority: Low    Complete heart block ([address] 05/01/2022     Priority: Low    Amaurosis fugax of right eye 05/01/2022     Priority: Low    RBBB 03/28/2022     Priority: Low    Allergic rhinitis 01/16/2017     Priority: Low    Coronary atherosclerosis of native coronary vessel 01/16/2017     Priority: Low     1. Unstable angina (3/29/13):   a. LHC:  LMCA: Normal.  LAD: 20% prox. 20-30% mid. LCx: Normal.  OM3:   20% prox. RCA: 20-30% proximal.  95% distal.  RPL: 30% RPL. EF 65%. Normal wall motion. b.  PCI of distal RCA:   4 X 23 mm Xience MASON. 2. Cardiolite at [facility] 2017. Normal   3. Echo (3/31/2022): EF 60-65%. Normal diastolic function. Mild MR/TR. Dyslipidemia 03/29/2013     Priority: Low    Primary hypertension 03/29/2013     Priority: Low        Social History     Tobacco Use    Smoking status: Never    Smokeless tobacco: Never   Substance Use Topics    Alcohol use:  Yes     Alcohol/week: 2.0 standard drinks     Types: 2 Cans of beer per week       ROS:    Review of Systems   Constitutional: Positive for malaise/fatigue. Cardiovascular:  Positive for dyspnea on exertion. Musculoskeletal:  Negative for arthritis. Neurological:  Positive for dizziness. PHYSICAL EXAM:  Wt Readings from Last 3 Encounters:   08/26/22 199 lb 6.4 oz (90.4 kg)   07/22/22 195 lb (88.5 kg)   07/21/22 201 lb 8 oz (91.4 kg)     BP Readings from Last 3 Encounters:   08/26/22 108/68   07/25/22 139/72   07/21/22 126/74     Pulse Readings from Last 3 Encounters:   08/26/22 72   07/25/22 65   07/21/22 67       Physical Exam  Constitutional:       General: He is not in acute distress. Neck:      Vascular: No carotid bruit. Cardiovascular:      Rate and Rhythm: Normal rate and regular rhythm. Pulmonary:      Effort: No respiratory distress. Breath sounds: Normal breath sounds. Abdominal:      General: Bowel sounds are normal.      Palpations: Abdomen is soft. Musculoskeletal:         General: No swelling. Skin:     General: Skin is warm and dry. Neurological:      General: No focal deficit present. Psychiatric:         Mood and Affect: Mood normal.       Medical problems and test results were reviewed with the patient today.        Lab Results   Component Value Date    WBC 7.5 05/03/2022    HGB 12.9 (L) 05/03/2022    HCT 36.1 (L) 05/03/2022    MCV 93.8 05/03/2022     05/03/2022       Lab Results   Component Value Date/Time     05/03/2022 06:02 AM    K 3.5 05/03/2022 06:02 AM     05/03/2022 06:02 AM    CO2 29 05/03/2022 06:02 AM    BUN 13 05/03/2022 06:02 AM    CREATININE 1.00 05/03/2022 06:02 AM    GLUCOSE 103 05/03/2022 06:02 AM    CALCIUM 8.7 05/03/2022 06:02 AM        Lab Results   Component Value Date    CHOL 129 05/02/2022     Lab Results   Component Value Date    TRIG 193 (H) 05/02/2022     Lab Results   Component Value Date    HDL 42 05/02/2022     Lab Results   Component Value Date    LDLCALC 48.4 05/02/2022     Lab Results   Component Value Date    LABVLDL 38.6 (H) 05/02/2022 Lab Results   Component Value Date    JOSIAS 3.1 05/02/2022        Data from outside records/labs from outside providers have been reviewed and summarized as noted in the HPI, past history and data review sections of this note       ASSESSMENT and PLAN      1. Primary hypertension  Orthostatic symptoms. Decrease Diovan/HCTZ as outlined below.  - valsartan-hydroCHLOROthiazide (DIOVAN HCT) 160-12.5 MG per tablet; Take 1 tablet by mouth daily  Dispense: 30 tablet; Refill: 3    2. Atherosclerosis of native coronary artery of native heart with angina pectoris (HCC)  Dyspnea which may represent anginal equivalent. No recent ischemia evaluation. Check stress test.  - Nuclear stress test with myocardial perfusion; Future    3. Pacemaker  Device function. Discussed that we will check his ejection fraction with upcoming stress test given that he is on 100% ventricularly paced. 4. Dyslipidemia  Continue pPraluent. 5. Dyspnea on exertion  Check stress test.  Encourage exercise regimen if stress test normal.                Chilango Giraldo MD  8/26/2022  8:42 AM    This note may have been dictated using speech recognition software.   As a result, error of speech recognition may have occurred

## 2022-09-06 ENCOUNTER — TELEPHONE (OUTPATIENT)
Dept: CARDIOLOGY CLINIC | Age: 67
End: 2022-09-06

## 2022-09-06 LAB
FUNGAL CULT/SMEAR: NORMAL
FUNGUS (MYCOLOGY) CULTURE: NEGATIVE
FUNGUS SMEAR: NORMAL
REFLEX TO ID: NORMAL
SPECIMEN PROCESSING: NORMAL
SPECIMEN SOURCE: NORMAL
SPECIMEN SOURCE: NORMAL

## 2022-09-06 NOTE — TELEPHONE ENCOUNTER
Patient called with results and appointment scheduled for Thursday 9-8-22 830 am MA//ofelia    ----- Message from Patience Manley MD sent at 9/6/2022  3:57 PM EDT -----  Please call patient. Stress test mildly abnormal.  Can we get him and discuss this week or next.    Thank you

## 2022-09-07 LAB
ACID FAST STN SPEC: NEGATIVE
MYCOBACTERIUM SPEC QL CULT: NEGATIVE
SPECIMEN PREPARATION: NORMAL
SPECIMEN SOURCE: NORMAL

## 2022-09-08 ENCOUNTER — OFFICE VISIT (OUTPATIENT)
Dept: CARDIOLOGY CLINIC | Age: 67
End: 2022-09-08
Payer: MEDICARE

## 2022-09-08 VITALS
HEIGHT: 69 IN | HEART RATE: 64 BPM | SYSTOLIC BLOOD PRESSURE: 136 MMHG | DIASTOLIC BLOOD PRESSURE: 80 MMHG | WEIGHT: 202.6 LBS | BODY MASS INDEX: 30.01 KG/M2

## 2022-09-08 DIAGNOSIS — E78.5 DYSLIPIDEMIA: ICD-10-CM

## 2022-09-08 DIAGNOSIS — I10 PRIMARY HYPERTENSION: ICD-10-CM

## 2022-09-08 DIAGNOSIS — I25.119 ATHEROSCLEROSIS OF NATIVE CORONARY ARTERY OF NATIVE HEART WITH ANGINA PECTORIS (HCC): ICD-10-CM

## 2022-09-08 DIAGNOSIS — I25.119 ATHEROSCLEROSIS OF NATIVE CORONARY ARTERY OF NATIVE HEART WITH ANGINA PECTORIS (HCC): Primary | ICD-10-CM

## 2022-09-08 DIAGNOSIS — Z95.0 PACEMAKER: ICD-10-CM

## 2022-09-08 PROBLEM — R93.89 ABNORMAL CT OF THE CHEST: Status: RESOLVED | Noted: 2022-07-21 | Resolved: 2022-09-08

## 2022-09-08 PROBLEM — R55 SYNCOPE: Status: RESOLVED | Noted: 2022-05-01 | Resolved: 2022-09-08

## 2022-09-08 LAB
ANION GAP SERPL CALC-SCNC: 4 MMOL/L (ref 4–13)
BUN SERPL-MCNC: 18 MG/DL (ref 8–23)
CALCIUM SERPL-MCNC: 9.6 MG/DL (ref 8.3–10.4)
CHLORIDE SERPL-SCNC: 103 MMOL/L (ref 101–110)
CO2 SERPL-SCNC: 30 MMOL/L (ref 21–32)
CREAT SERPL-MCNC: 1 MG/DL (ref 0.8–1.5)
ERYTHROCYTE [DISTWIDTH] IN BLOOD BY AUTOMATED COUNT: 12.5 % (ref 11.9–14.6)
GLUCOSE SERPL-MCNC: 107 MG/DL (ref 65–100)
HCT VFR BLD AUTO: 44.3 % (ref 41.1–50.3)
HGB BLD-MCNC: 15.1 G/DL (ref 13.6–17.2)
INR PPP: 0.9
MCH RBC QN AUTO: 33.1 PG (ref 26.1–32.9)
MCHC RBC AUTO-ENTMCNC: 34.1 G/DL (ref 31.4–35)
MCV RBC AUTO: 97.1 FL (ref 79.6–97.8)
NRBC # BLD: 0 K/UL (ref 0–0.2)
PLATELET # BLD AUTO: 198 K/UL (ref 150–450)
PMV BLD AUTO: 10.2 FL (ref 9.4–12.3)
POTASSIUM SERPL-SCNC: 4.5 MMOL/L (ref 3.5–5.1)
PROTHROMBIN TIME: 12.4 SEC (ref 12.6–14.5)
RBC # BLD AUTO: 4.56 M/UL (ref 4.23–5.6)
SODIUM SERPL-SCNC: 137 MMOL/L (ref 138–145)
WBC # BLD AUTO: 5.3 K/UL (ref 4.3–11.1)

## 2022-09-08 PROCEDURE — 1036F TOBACCO NON-USER: CPT | Performed by: INTERNAL MEDICINE

## 2022-09-08 PROCEDURE — 3017F COLORECTAL CA SCREEN DOC REV: CPT | Performed by: INTERNAL MEDICINE

## 2022-09-08 PROCEDURE — G8427 DOCREV CUR MEDS BY ELIG CLIN: HCPCS | Performed by: INTERNAL MEDICINE

## 2022-09-08 PROCEDURE — G8419 CALC BMI OUT NRM PARAM NOF/U: HCPCS | Performed by: INTERNAL MEDICINE

## 2022-09-08 PROCEDURE — 99214 OFFICE O/P EST MOD 30 MIN: CPT | Performed by: INTERNAL MEDICINE

## 2022-09-08 ASSESSMENT — ENCOUNTER SYMPTOMS: SHORTNESS OF BREATH: 0

## 2022-09-08 NOTE — PROGRESS NOTES
[address], [address] [place] [address], [address] [place] [address]  PHONE: [phone]    Norma Summers  1955      SUBJECTIVE:   Norma Summers is a 61 y.o. male seen for a follow up visit regarding the following:     Chief Complaint   Patient presents with    Results     Abnormal stress test       HPI:    Patient presents for follow-up. Recently seen on August 26 with worsening dyspnea on exertion and exertional fatigue. Given his prior cardiac history with coronary artery disease cardiac stress testing was performed. His Radha Cogan shows a inferior apical and apical fixed defect concerning for prior myocardial infarction. No obvious ischemia. Moderate hypokinesis of this area. He has no history of previous myocardial infarction. His last cardiac catheterization was in 2013. Has dyspnea on exertion. Past Medical History, Past Surgical History, Family history, Social History, and Medications were all reviewed with the patient today and updated as necessary.            Current Outpatient Medications:     valsartan-hydroCHLOROthiazide (DIOVAN HCT) 160-12.5 MG per tablet, Take 1 tablet by mouth daily, Disp: 30 tablet, Rfl: 3    albuterol sulfate HFA (PROVENTIL;VENTOLIN;PROAIR) 108 (90 Base) MCG/ACT inhaler, , Disp: , Rfl:     allopurinol (ZYLOPRIM) 300 MG tablet, Take 300 mg by mouth in the morning., Disp: , Rfl:     colchicine (COLCRYS) 0.6 MG tablet, Take 0.6 mg by mouth as needed, Disp: , Rfl:     pantoprazole (PROTONIX) 40 MG tablet, TAKE ONE TABLET BY MOUTH ONE TIME DAILY, Disp: , Rfl:     montelukast (SINGULAIR) 10 MG tablet, Take 1 tablet by mouth in the morning., Disp: 30 tablet, Rfl: 3    fluticasone-salmeterol (ADVAIR DISKUS) 250-50 MCG/ACT AEPB diskus inhaler, Inhale 1 puff into the lungs in the morning and 1 puff in the evening., Disp: 1 each, Rfl: 11    Alirocumab 150 MG/ML SOAJ, Inject 150 mg into the skin, Disp: , Rfl:     aspirin 81 MG EC tablet, Take 81 mg by mouth daily, Disp: , Rfl:     Coenzyme Q10 10 MG CAPS, Take by mouth, Disp: , Rfl:     levothyroxine (SYNTHROID) 100 MCG tablet, Take 100 mcg by mouth every morning (before breakfast), Disp: , Rfl:     nitroGLYCERIN (NITROSTAT) 0.4 MG SL tablet, Place 0.4 mg under the tongue, Disp: , Rfl:      Allergies   Allergen Reactions    Crestor [Rosuvastatin Calcium] Myalgia     Leg pain    Lipitor [Atorvastatin] Myalgia     Leg pain    Amlodipine Swelling    Zetia [Ezetimibe] Myalgia     Leg pain          Patient Active Problem List    Diagnosis Date Noted    Pacemaker 05/18/2022     Priority: Low     1. Dual chamber pacemaker (BSC) placed due to heart block and syncope   (5/2/22)        Complete heart block ([address] 05/01/2022     Priority: Low    Amaurosis fugax of right eye 05/01/2022     Priority: Low    RBBB 03/28/2022     Priority: Low    Allergic rhinitis 01/16/2017     Priority: Low    Coronary atherosclerosis of native coronary vessel 01/16/2017     Priority: Low     1. Unstable angina (3/29/13):   a. LHC:  LMCA: Normal.  LAD: 20% prox. 20-30% mid. LCx: Normal.  OM3:   20% prox. RCA: 20-30% proximal.  95% distal.  RPL: 30% RPL. EF 65%. Normal wall motion. b.  PCI of distal RCA:   4 X 23 mm Xience MASON. 2. Cardiolite at [place] 2017. Normal   3. Echo (3/31/2022): EF 60-65%. Normal diastolic function. Mild MR/TR. Dyslipidemia 03/29/2013     Priority: Low    Primary hypertension 03/29/2013     Priority: Low        Social History     Tobacco Use    Smoking status: Never    Smokeless tobacco: Never   Substance Use Topics    Alcohol use: Yes     Alcohol/week: 2.0 standard drinks     Types: 2 Cans of beer per week       ROS:    Review of Systems   Constitutional: Negative for malaise/fatigue. Cardiovascular:  Positive for dyspnea on exertion. Negative for chest pain. Respiratory:  Negative for shortness of breath. Musculoskeletal:  Positive for arthritis.    Neurological:  Negative for focal weakness. Psychiatric/Behavioral:  Negative for depression. PHYSICAL EXAM:  Wt Readings from Last 3 Encounters:   09/08/22 202 lb 9.6 oz (91.9 kg)   09/02/22 199 lb (90.3 kg)   08/26/22 199 lb 6.4 oz (90.4 kg)     BP Readings from Last 3 Encounters:   09/08/22 136/80   09/02/22 120/74   08/26/22 108/68     Pulse Readings from Last 3 Encounters:   09/08/22 64   09/02/22 60   08/26/22 72       Physical Exam  Constitutional:       General: He is not in acute distress. Neck:      Vascular: No carotid bruit. Cardiovascular:      Rate and Rhythm: Normal rate and regular rhythm. Pulmonary:      Effort: No respiratory distress. Breath sounds: Normal breath sounds. Abdominal:      General: Bowel sounds are normal.      Palpations: Abdomen is soft. Musculoskeletal:         General: No swelling. Skin:     General: Skin is warm and dry. Neurological:      General: No focal deficit present. Psychiatric:         Mood and Affect: Mood normal.       Medical problems and test results were reviewed with the patient today.        Lab Results   Component Value Date    WBC 7.5 05/03/2022    HGB 12.9 (L) 05/03/2022    HCT 36.1 (L) 05/03/2022    MCV 93.8 05/03/2022     05/03/2022       Lab Results   Component Value Date/Time     05/03/2022 06:02 AM    K 3.5 05/03/2022 06:02 AM     05/03/2022 06:02 AM    CO2 29 05/03/2022 06:02 AM    BUN 13 05/03/2022 06:02 AM    CREATININE 1.00 05/03/2022 06:02 AM    GLUCOSE 103 05/03/2022 06:02 AM    CALCIUM 8.7 05/03/2022 06:02 AM        Lab Results   Component Value Date    CHOL 129 05/02/2022     Lab Results   Component Value Date    TRIG 193 (H) 05/02/2022     Lab Results   Component Value Date    HDL 42 05/02/2022     Lab Results   Component Value Date    LDLCALC 48.4 05/02/2022     Lab Results   Component Value Date    LABVLDL 38.6 (H) 05/02/2022     Lab Results   Component Value Date    CHOLHDLRATIO 3.1 05/02/2022        Data from outside records/labs from outside providers have been reviewed and summarized as noted in the HPI, past history and data review sections of this note       ASSESSMENT and PLAN      1. Atherosclerosis of native coronary artery of native heart with angina pectoris [facility])  Discussed with patient his abnormal stress test findings. He has no history of prior MI and has dyspnea on exertion. Concern of a recent cardiac event. Also this could represent artifact due to pacing. Given his symptoms and known history of coronary arteries I recommended cardiac catheterization for definition of his coronary anatomy and possible percutaneous revascularization if indicated. He has voiced his understanding of the above and agrees to proceed. - Case Request Cardiac Cath Lab  - Basic Metabolic Panel; Future  - CBC; Future  - Protime-INR    2. Primary hypertension  BP controlled. Continue Diovan/HCTZ    3. Pacemaker  Normal function. 4. Dyslipidemia  Continue  [place]. Griffin Avalos MD  9/8/2022  8:41 AM    This note may have been dictated using speech recognition software.   As a result, error of speech recognition may have occurred

## 2022-09-12 NOTE — PROGRESS NOTES
Patient pre-assessment complete for [place] scheduled for 22, arrival time 0930 after [facility] Pulmonary Appt at 8:20. Patient verified using . Patient instructed to bring a list of all home medications on the day of procedure. NPO status reinforced. Patient informed to take a full dose aspirin 325mg  or 81 mg x 4 on the day of procedure. Patient instructed to HOLD Diovan. Instructed they can take all other medications excluding vitamins & supplements. Patient verbalizes understanding of all instructions & denies any questions at this time.

## 2022-09-13 ENCOUNTER — HOSPITAL ENCOUNTER (OUTPATIENT)
Age: 67
Setting detail: OUTPATIENT SURGERY
Discharge: HOME | End: 2022-09-13
Attending: INTERNAL MEDICINE | Admitting: INTERNAL MEDICINE
Payer: MEDICARE

## 2022-09-13 ENCOUNTER — OFFICE VISIT (OUTPATIENT)
Dept: PULMONOLOGY | Age: 67
End: 2022-09-13
Payer: MEDICARE

## 2022-09-13 VITALS
WEIGHT: 203 LBS | TEMPERATURE: 98 F | OXYGEN SATURATION: 96 % | SYSTOLIC BLOOD PRESSURE: 140 MMHG | RESPIRATION RATE: 20 BRPM | BODY MASS INDEX: 30.07 KG/M2 | DIASTOLIC BLOOD PRESSURE: 80 MMHG | HEART RATE: 61 BPM | HEIGHT: 69 IN

## 2022-09-13 VITALS
SYSTOLIC BLOOD PRESSURE: 140 MMHG | DIASTOLIC BLOOD PRESSURE: 74 MMHG | OXYGEN SATURATION: 96 % | HEART RATE: 60 BPM | TEMPERATURE: 97.7 F | RESPIRATION RATE: 16 BRPM

## 2022-09-13 DIAGNOSIS — R91.8 PULMONARY INFILTRATES: Primary | ICD-10-CM

## 2022-09-13 DIAGNOSIS — R06.09 DYSPNEA ON EXERTION: ICD-10-CM

## 2022-09-13 DIAGNOSIS — I25.119 ATHEROSCLEROSIS OF NATIVE CORONARY ARTERY OF NATIVE HEART WITH ANGINA PECTORIS (HCC): ICD-10-CM

## 2022-09-13 DIAGNOSIS — G47.33 OSA (OBSTRUCTIVE SLEEP APNEA): ICD-10-CM

## 2022-09-13 DIAGNOSIS — I25.10 CAD (CORONARY ARTERY DISEASE): ICD-10-CM

## 2022-09-13 DIAGNOSIS — J45.40 MODERATE PERSISTENT ASTHMA WITHOUT COMPLICATION: ICD-10-CM

## 2022-09-13 DIAGNOSIS — K21.9 GASTROESOPHAGEAL REFLUX DISEASE, UNSPECIFIED WHETHER ESOPHAGITIS PRESENT: ICD-10-CM

## 2022-09-13 DIAGNOSIS — J30.89 NON-SEASONAL ALLERGIC RHINITIS, UNSPECIFIED TRIGGER: ICD-10-CM

## 2022-09-13 LAB
ECHO BSA: 2.11 M2
EKG ATRIAL RATE: 60 BPM
EKG DIAGNOSIS: NORMAL
EKG P AXIS: 49 DEGREES
EKG P-R INTERVAL: 326 MS
EKG Q-T INTERVAL: 466 MS
EKG QRS DURATION: 174 MS
EKG QTC CALCULATION (BAZETT): 466 MS
EKG R AXIS: -81 DEGREES
EKG T AXIS: 66 DEGREES
EKG VENTRICULAR RATE: 60 BPM

## 2022-09-13 PROCEDURE — G8427 DOCREV CUR MEDS BY ELIG CLIN: HCPCS | Performed by: INTERNAL MEDICINE

## 2022-09-13 PROCEDURE — 93458 L HRT ARTERY/VENTRICLE ANGIO: CPT | Performed by: INTERNAL MEDICINE

## 2022-09-13 PROCEDURE — 99152 MOD SED SAME PHYS/QHP 5/>YRS: CPT | Performed by: INTERNAL MEDICINE

## 2022-09-13 PROCEDURE — 99215 OFFICE O/P EST HI 40 MIN: CPT | Performed by: INTERNAL MEDICINE

## 2022-09-13 PROCEDURE — 2580000003 HC RX 258: Performed by: INTERNAL MEDICINE

## 2022-09-13 PROCEDURE — C1894 INTRO/SHEATH, NON-LASER: HCPCS | Performed by: INTERNAL MEDICINE

## 2022-09-13 PROCEDURE — G8417 CALC BMI ABV UP PARAM F/U: HCPCS | Performed by: INTERNAL MEDICINE

## 2022-09-13 PROCEDURE — 6360000002 HC RX W HCPCS: Performed by: INTERNAL MEDICINE

## 2022-09-13 PROCEDURE — C1887 CATHETER, GUIDING: HCPCS | Performed by: INTERNAL MEDICINE

## 2022-09-13 PROCEDURE — 1036F TOBACCO NON-USER: CPT | Performed by: INTERNAL MEDICINE

## 2022-09-13 PROCEDURE — 2709999900 HC NON-CHARGEABLE SUPPLY: Performed by: INTERNAL MEDICINE

## 2022-09-13 PROCEDURE — 6360000004 HC RX CONTRAST MEDICATION: Performed by: INTERNAL MEDICINE

## 2022-09-13 PROCEDURE — 93005 ELECTROCARDIOGRAM TRACING: CPT | Performed by: INTERNAL MEDICINE

## 2022-09-13 PROCEDURE — C1769 GUIDE WIRE: HCPCS | Performed by: INTERNAL MEDICINE

## 2022-09-13 PROCEDURE — 3017F COLORECTAL CA SCREEN DOC REV: CPT | Performed by: INTERNAL MEDICINE

## 2022-09-13 PROCEDURE — 2500000003 HC RX 250 WO HCPCS: Performed by: INTERNAL MEDICINE

## 2022-09-13 RX ORDER — ACETAMINOPHEN 325 MG/1
650 TABLET ORAL EVERY 4 HOURS PRN
OUTPATIENT
Start: 2022-09-13

## 2022-09-13 RX ORDER — HYDRALAZINE HYDROCHLORIDE 20 MG/ML
10 INJECTION INTRAMUSCULAR; INTRAVENOUS EVERY 10 MIN PRN
OUTPATIENT
Start: 2022-09-13

## 2022-09-13 RX ORDER — LIDOCAINE HYDROCHLORIDE 10 MG/ML
INJECTION, SOLUTION INFILTRATION; PERINEURAL PRN
Status: DISCONTINUED | OUTPATIENT
Start: 2022-09-13 | End: 2022-09-13 | Stop reason: HOSPADM

## 2022-09-13 RX ORDER — SODIUM CHLORIDE 9 MG/ML
INJECTION, SOLUTION INTRAVENOUS CONTINUOUS
Status: DISCONTINUED | OUTPATIENT
Start: 2022-09-13 | End: 2022-09-13 | Stop reason: HOSPADM

## 2022-09-13 RX ORDER — HEPARIN SODIUM 200 [USP'U]/100ML
INJECTION, SOLUTION INTRAVENOUS CONTINUOUS PRN
Status: COMPLETED | OUTPATIENT
Start: 2022-09-13 | End: 2022-09-13

## 2022-09-13 RX ORDER — SODIUM CHLORIDE 9 MG/ML
INJECTION, SOLUTION INTRAVENOUS PRN
OUTPATIENT
Start: 2022-09-13

## 2022-09-13 RX ORDER — ONDANSETRON 2 MG/ML
4 INJECTION INTRAMUSCULAR; INTRAVENOUS EVERY 6 HOURS PRN
OUTPATIENT
Start: 2022-09-13

## 2022-09-13 RX ORDER — ASPIRIN 81 MG/1
324 TABLET, CHEWABLE ORAL ONCE
Status: DISCONTINUED | OUTPATIENT
Start: 2022-09-13 | End: 2022-09-13 | Stop reason: HOSPADM

## 2022-09-13 RX ORDER — SODIUM CHLORIDE 0.9 % (FLUSH) 0.9 %
5-40 SYRINGE (ML) INJECTION EVERY 12 HOURS SCHEDULED
OUTPATIENT
Start: 2022-09-13

## 2022-09-13 RX ORDER — MIDAZOLAM HYDROCHLORIDE 1 MG/ML
INJECTION INTRAMUSCULAR; INTRAVENOUS PRN
Status: DISCONTINUED | OUTPATIENT
Start: 2022-09-13 | End: 2022-09-13 | Stop reason: HOSPADM

## 2022-09-13 RX ADMIN — SODIUM CHLORIDE: 900 INJECTION, SOLUTION INTRAVENOUS at 10:07

## 2022-09-13 ASSESSMENT — PAIN SCALES - GENERAL: PAINLEVEL_OUTOF10: 0

## 2022-09-13 NOTE — PROGRESS NOTES
Patient up to bedside, vital signs and site stable. Patient ambulated to bathroom without difficulty. Patient voided without difficulty. Vascular site stable. Discharge instructions and home medications reviewed with patient. Time allowed for questions and answers. Site stable after ambulation. Peripheral IV sites dc'd without difficulty with tips intact. [id number] Patient discharged to home with family.

## 2022-09-13 NOTE — PROGRESS NOTES
TRANSFER - OUT REPORT:    Verbal report given to RN on Cintia Hawkins  being transferred to [facility] for routine progression of patient care       Report consisted of patient's Situation, Background, Assessment and   Recommendations(SBAR). Information from the following report(s) Nurse Handoff Report was reviewed with the receiving nurse.     [facility] with Dr. Carole Flores  No intervention  R radial access  TR band with 15 mL @ 1122  Versed 2mg IV  Fentanyl 50mcg IV

## 2022-09-13 NOTE — PROGRESS NOTES
Report received from [address] [place] [address]. Procedural findings communicated. Intra procedural  medication administration reviewed. Progression of care discussed.      Patient received into [address] [facility] 3 post sheath removal.     Access site without bleeding or swelling yes    Dressing dry and intact yes    Patient instructed to limit movement to right upper extremity    Routine post procedural vital signs and site assessment initiated yes

## 2022-09-13 NOTE — PROGRESS NOTES
Patient received to [address] [place] [address] room # 8  Ambulatory from [facility]. Patient scheduled for [facility] today with Dr Pete Garcia. Procedure reviewed & questions answered, voiced good understanding consent obtained & placed on chart. All medications and medical history reviewed. Will prep patient per orders. Patient & family updated on plan of care. The patient has a fraility score of 3-MANAGING WELL, based on ambulation. 324mg of Aspirin taken at 0630.

## 2022-09-13 NOTE — Clinical Note
Contrast Dose Calculator:   Patient's age: 61.   Patient's sex: Male. Patient weight (kg) = 91.6. Creatinine level (mg/dL) = 1. Creatinine clearance (mL/min): 97.   Contrast concentration (mg/mL) = 370. MACD = 300 mL. Max Contrast dose per Creatinine Cl calculator = 218.25 mL.

## 2022-09-13 NOTE — PROGRESS NOTES
Terumo band completely deflated. 1320 Terumo band removed from right wrist using sterile technique. Sterile dressing applied. No signs and symptoms of bleeding, oozing or hematoma.

## 2022-09-13 NOTE — PROGRESS NOTES
Patient Name:  Rosemary Pollard                             YOB: 1955  MRN: [id number]                                              Office Visit 9/13/2022    ASSESSMENT AND PLAN:  (Medical Decision Making)    Jack Brar was seen today for shortness of breath. Diagnoses and all orders for this visit:    Pulmonary infiltrates  -     CT CHEST WO CONTRAST; Future  -Patient with infiltrates previously, but currently feeling better. Lung exam is unremarkable. See studies that were done below that are unremarkable. Given overall improvement in condition would not pursue this unless they seem to persist on follow-up CT. If it does may eventually need a biopsy. Can also do a another ESR, BNP as well as a CBC to rule for infection.  -Can try Mucinex 1200 mg twice a day. I also showed his wife to do chest PT to see if that would help. Moderate persistent asthma without complication  -Currently stable on Advair twice a day with rescue inhaler as needed would continue. Dyspnea on exertion  -Does have dyspnea with exertion seeing cardiology today for catheterization would recommend follow-up. Atherosclerosis of native coronary artery of native heart with angina pectoris [address] [address]  -See above  Gastroesophageal reflux disease, unspecified whether esophagitis present  -Does have severe reflux and is currently taking medications. We will send to gastroenterology may eventually need EGD as well. Non-seasonal allergic rhinitis, unspecified trigger  -Would benefit from antihistamines. Is on Singulair recommend that he try Allegra daily to see if that would help. SUSANNE (obstructive sleep apnea)  -In the past has been intolerant of PAP therapy. I did give him Katelyn fullface mask to see if that would help him tolerate it. Not sure he can tolerate nasal mask 100% of the time, since he does have a deviated septum, but the Katelyn nasal was also another option.   We will set him up in the sleep center for a visit, will need to get old records from [facility] as well since he had the studies done there with Dr. Zachery Ponce. All questions answered for the patient today. Appreciative time spent. I did wish him well for his upcoming catheterization today. No orders of the defined types were placed in this encounter. No orders of the defined types were placed in this encounter. Follow-up and Dispositions    Return in about 6 months (around 3/13/2023) for With Anirudh in Pulmonary  and Follow up with Sleep in [facility] [place], [place] Prior to Appointment. Li Thomas MD    Total time for encounter on day of encounter was 44 minutes. This time includes chart prep, review of tests/procedures, review of other provider's notes, documentation and counseling patient regarding disease process and medications. _________________________________________________________________________    HISTORY OF PRESENT ILLNESS:    Mr. Britt Castaneda is a 61 y.o. male who is seen at [facility] today for  Shortness of Breath  Patient previously seen by Christie Johnson NP. Patient had wheezing, coughing, sinus drainage multiple family members with asthma and had an abnormal CAT scan of the chest.  Patient was some exposure in the yardwork from chemicals and verbalizes but not significant amount. Also did body water. His CAT scan showed some mediastinal lymph nodes and granulomas with some groundglass changes. Work-up for sarcoid, histo, regular infiltrates was unremarkable. Please note extensive laboratory studies below. CD4 to CD8 ratio was 0.78, but ESR was normal and neutrophil predominant fluid by 96%. Since patient has been on inhalers, overall feels much better, but not 100%. Does report wheezing is resolved. Still reports sinus drainage and also does report rather significant reflux despite medications.   He did see Dr. Wendy Oneill on [phone] who eventually did additional work-up including a stress test which showed abnormal inferior apical and apical fixed defect concerning for prior MI. As a result he was scheduled for cardiac catheterization today. REVIEW OF SYSTEMS: 10 point review of systems is negative except as reported in HPI. PHYSICAL EXAM: Body mass index is 30.42 kg/m². Vitals:    09/13/22 0828   BP: (!) 140/80   Pulse: 61   Resp: 20   Temp: 98 °F (36.7 °C)   TempSrc: Temporal   SpO2: 96%   Weight: 203 lb (92.1 kg)   Height: 5' 8.5\" (1.74 m)         General:   Alert, cooperative, no distress, appears stated age. Eyes:   Conjunctivae/corneas clear. PERRL        Mouth/Throat:  Lips, mucosa, and tongue normal. Teeth and gums normal.,  Modified Cloud stage IV. Nasal septum deviated to the right with decreased air entry in the right nasal passage        Lungs:   Clear to auscultation     Heart:   Regular rate and rhythm, S1, S2 normal, no murmur, click, rub or gallop. Abdomen:    Soft, non-tender. Extremities:  Extremities normal, atraumatic, no cyanosis or edema. Skin:  Skin color normal. No rashes or lesions     Neurologic:  A&Ox3     DIAGNOSTIC TESTS:                                                                                    LABS:   Lab Results   Component Value Date/Time    WBC 5.3 09/08/2022 09:33 AM    HGB 15.1 09/08/2022 09:33 AM    HCT 44.3 09/08/2022 09:33 AM     09/08/2022 09:33 AM    TSH 0.957 05/01/2022 06:14 PM    ESR 11 05/02/2022 02:40 AM     BAL and LABs 7/26  Negative for  Blasto  Histoplasmosis  HIV. Respiratory culture. AFB culture. Atypical pneumonia. Aspergillus. Varicella. Viral culture. Fungal culture. Cytology-negative for cancer cells. Lymphocyte ratio CD4 to CD8 was 0.78. Differential was neutrophils 96% lymphocytes 6%    Imaging: I performed an independent interpretation of the patient's images.   CXR:   XR CHEST PORTABLE 05/02/2022    Narrative  CHEST X-RAY, 1 VIEW    INDICATION: Cardiac pacemaker placement    COMPARISON: 5/1/2022    TECHNIQUE: Single AP view of the chest was obtained. FINDINGS:    Lungs: Normal.    Cardiomediastinal silhouette: Dual-chamber cardiac pacer. Leads appear intact  and well positioned. Pleura: No pneumothorax or pleural effusion. Osseous structures: Normal.    Impression  No pneumothorax following dual-chamber cardiac pacemaker placement. CT Chest:   CT CHEST W CONTRAST 07/06/2022    Narrative  CT Chest with contrast 7/6/2022    Indication: Follow-up nodular opacity seen on recent head and neck CT    Comparison: 5/1/2022    Protocol:  Contiguous 5 mm axial images were obtained from the neck base through  the upper abdomen with intravenous contrast, 80 mL Isovue 370. Radiation dose  reduction techniques were used for this study:  Our CT scanners use one or all  of the following: Automated exposure control, adjustment of the mA and/or kVp  according to patient's size, iterative reconstruction. Findings:  Pulmonary parenchyma with few scattered calcified benign granulomata. In  addition, subcentimeter calcified mediastinal and hilar lymph nodes also present  consistent with sequelae of granulomatous process. Mild cardiac enlargement with  coronary artery calcification. Left chest pacer device. No pericardial effusion,  pleural effusion, or pneumothorax. No pathologic adenopathy. Few perihilar scattered subcentimeter nodules extending to the periphery with  more concentrated focus along the right minor fissure series 2 image 25 in the  right apex as noted on previous CT exam. Limited evaluation upper abdomen shows  extensive hepatic steatosis. The adrenal glands are normal. No acute or  aggressive osseous lesion. Impression  1. Sequelae remote granulomatous process as outlined above evidenced by  calcified pulmonary nodules and mediastinal/hilar lymph node. 2. Predominant perihilar nodular foci also likely related to granulomatous  process although endobronchial infection not excluded. Pulmonology consultation  recommended. CT of chest without contrast:     7/6/22      Nuclear Medicine: No results found for this or any previous visit from the past 3650 days. PFTs:   Office Spirometry Results Latest Ref Rng & Units 7/21/2022   FVC L 3.66   FEV1 L 2.71   FEV1 %PRED-PRE % 81   FVC %PRED-PRE % 82   FEV1/FVC % 74     No results found for this or any previous visit. No results found for this or any previous visit. FeNO: No results found for this or any previous visit. FeNO and Likelihood of Eosinophilic Asthma   Unlikely Intermediate Likely   <25 ppb 25-50 ppb >50ppb   Exercise Oximetry:  Echo:   TRANSTHORACIC ECHOCARDIOGRAM (TTE) LIMITED (CONTRAST/BUBBLE/3D PRN) 05/02/2022    Narrative  This is a summary report. The complete report is available in the patient's medical record. If you cannot access the medical record, please contact the sending organization for a detailed fax or copy. Left Ventricle: Normal left ventricular systolic function. Left ventricle size is normal. Normal wall thickness. Normal wall motion. No pericardial effusion. [facility] Reference Info:                                                                                                                Exposure History:  Second Hand Smoke Exposure: No  Birds: No  Asbestos: Yes?   TB: No  Hot Tubs/Humidifier: No  Organic/Inorganic Dusts: Yes  Molds: No  Occupation/Hobbies: machine shops, welding  Past Medical History:   Diagnosis Date    Asthma     Chest pain 3/29/2013    Edema 1/16/2017    Erectile dysfunction 1/16/2017    GERD (gastroesophageal reflux disease) 3/29/2013    Hypothyroidism 3/29/2013        Tobacco Use      Smoking status: Never      Smokeless tobacco: Never    Allergies   Allergen Reactions    Crestor [Rosuvastatin Calcium] Myalgia     Leg pain    Lipitor [Atorvastatin] Myalgia     Leg pain    Amlodipine Swelling    Zetia [Ezetimibe] Myalgia     Leg pain       Current Outpatient Medications   Medication Instructions    albuterol sulfate HFA (PROVENTIL;VENTOLIN;PROAIR) 108 (90 Base) MCG/ACT inhaler No dose, route, or frequency recorded. Alirocumab 150 mg, SubCUTAneous    allopurinol (ZYLOPRIM) 300 mg, Oral, DAILY    aspirin 81 mg, Oral, DAILY    Coenzyme Q10 10 MG CAPS Oral    colchicine (COLCRYS) 0.6 mg, Oral, PRN    fluticasone-salmeterol (ADVAIR DISKUS) 250-50 MCG/ACT AEPB diskus inhaler 1 puff, Inhalation, EVERY 12 HOURS    levothyroxine (SYNTHROID) 100 mcg, Oral, DAILY BEFORE BREAKFAST    montelukast (SINGULAIR) 10 mg, Oral, DAILY    nitroGLYCERIN (NITROSTAT) 0.4 mg, SubLINGual    pantoprazole (PROTONIX) 40 MG tablet TAKE ONE TABLET BY MOUTH ONE TIME DAILY    valsartan-hydroCHLOROthiazide (DIOVAN HCT) 160-12.5 MG per tablet 1 tablet, Oral, DAILY          Echo:3/2022       Left Ventricle: Left ventricle size is normal. Normal wall thickness. Normal wall motion. Normal left ventricular systolic function with a visually estimated EF of 60 - 65%. Normal diastolic function. Mitral Valve: Mild transvalvular regurgitation.

## 2022-09-29 ENCOUNTER — TELEPHONE (OUTPATIENT)
Dept: CARDIOLOGY CLINIC | Age: 67
End: 2022-09-29

## 2022-09-30 RX ORDER — FLUTICASONE PROPIONATE AND SALMETEROL 250; 50 UG/1; UG/1
1 POWDER RESPIRATORY (INHALATION) EVERY 12 HOURS
Qty: 3 EACH | Refills: 3 | Status: SHIPPED | OUTPATIENT
Start: 2022-09-30

## 2022-10-03 ENCOUNTER — OFFICE VISIT (OUTPATIENT)
Dept: GASTROENTEROLOGY | Age: 67
End: 2022-10-03
Payer: MEDICARE

## 2022-10-03 VITALS — HEIGHT: 69 IN | BODY MASS INDEX: 30.07 KG/M2 | WEIGHT: 203 LBS | TEMPERATURE: 98.3 F

## 2022-10-03 DIAGNOSIS — K21.9 GERD WITHOUT ESOPHAGITIS: Primary | ICD-10-CM

## 2022-10-03 DIAGNOSIS — R68.81 EARLY SATIETY: ICD-10-CM

## 2022-10-03 DIAGNOSIS — R05.9 COUGH IN ADULT: ICD-10-CM

## 2022-10-03 PROCEDURE — 1123F ACP DISCUSS/DSCN MKR DOCD: CPT | Performed by: INTERNAL MEDICINE

## 2022-10-03 PROCEDURE — 99203 OFFICE O/P NEW LOW 30 MIN: CPT | Performed by: INTERNAL MEDICINE

## 2022-10-03 ASSESSMENT — ENCOUNTER SYMPTOMS
COUGH: 1
SHORTNESS OF BREATH: 0
TROUBLE SWALLOWING: 0
COLOR CHANGE: 0
ABDOMINAL PAIN: 0
BLOOD IN STOOL: 0
NAUSEA: 1
VOMITING: 0

## 2022-10-03 NOTE — H&P (VIEW-ONLY)
Lia Hill (:  1955) is a 74 y.o. male, new patient here for evaluation of the following chief complaint(s): Other (When eating getting full very fast), Gastroesophageal Reflux (Burning sensation in the back of throat ), and EGD         ASSESSMENT/PLAN:  1. GERD without esophagitis  2. Cough in adult  3. Early satiety           Subjective   SUBJECTIVE/OBJECTIVE  Patient with long history of reflux disease with hiatal hernia on proton pump inhibitor presenting with recurrent dry cough. His pulmonary evaluation was unremarkable. He is status post pacemaker placement. Has been experiencing worsening burning sensation in his chest from his reflux disease up to 5-month ago his symptoms were controlled on proton pump inhibitor. Denied any dysphagia. He admits to early satiety. Denied any melena weight loss. No recent endoscopic evaluation. Past Medical History:   Diagnosis Date    Asthma     Chest pain 3/29/2013    Edema 2017    Erectile dysfunction 2017    GERD (gastroesophageal reflux disease) 3/29/2013    Hypothyroidism 3/29/2013       Past Surgical History:   Procedure Laterality Date    BRONCHOSCOPY N/A 2022    BRONCHOSCOPY/BAL performed by Carmela James MD at [place] [address]  2013    CARDIAC PROCEDURE N/A 2022    Left heart cath / coronary angiography performed by Manny Alanis MD at [facility] CARDIAC CATH LAB    PACEMAKER INSERTION               Allergies   Allergen Reactions    Crestor [Rosuvastatin Calcium] Myalgia     Leg pain    Lipitor [Atorvastatin] Myalgia     Leg pain    Amlodipine Swelling    Zetia [Ezetimibe] Myalgia     Leg pain            Review of Systems   Constitutional:  Negative for appetite change. HENT:  Negative for mouth sores and trouble swallowing. Respiratory:  Positive for cough. Negative for shortness of breath. Cardiovascular:  Negative for chest pain. Gastrointestinal:  Positive for nausea. Negative for abdominal pain, blood in stool and vomiting. Skin:  Negative for color change. Allergic/Immunologic: Negative for food allergies. Neurological:  Negative for seizures and weakness. Hematological:  Does not bruise/bleed easily. Objective   Physical Exam  HENT:      Head: Normocephalic. Mouth/Throat:      Mouth: Mucous membranes are moist.   Eyes:      General: No scleral icterus. Cardiovascular:      Rate and Rhythm: Normal rate and regular rhythm. Pulmonary:      Effort: No respiratory distress. Abdominal:      General: There is no distension. Tenderness: There is no abdominal tenderness. There is no rebound. Lymphadenopathy:      Cervical: No cervical adenopathy. Skin:     Coloration: Skin is not jaundiced. Findings: No bruising. Neurological:      General: No focal deficit present. Mental Status: He is alert. Current Outpatient Medications   Medication Sig Dispense Refill    fluticasone-salmeterol (ADVAIR DISKUS) 250-50 MCG/ACT AEPB diskus inhaler Inhale 1 puff into the lungs in the morning and 1 puff in the evening. 3 each 3    Alirocumab 150 MG/ML SOAJ Inject 150 mg into the skin every 14 days 6 Adjustable Dose Pre-filled Pen Syringe 3    valsartan-hydroCHLOROthiazide (DIOVAN HCT) 160-12.5 MG per tablet Take 1 tablet by mouth daily 30 tablet 3    albuterol sulfate HFA (PROVENTIL;VENTOLIN;PROAIR) 108 (90 Base) MCG/ACT inhaler       allopurinol (ZYLOPRIM) 300 MG tablet Take 300 mg by mouth in the morning. colchicine (COLCRYS) 0.6 MG tablet Take 0.6 mg by mouth as needed      pantoprazole (PROTONIX) 40 MG tablet TAKE ONE TABLET BY MOUTH ONE TIME DAILY      montelukast (SINGULAIR) 10 MG tablet Take 1 tablet by mouth in the morning.  30 tablet 3    aspirin 81 MG EC tablet Take 81 mg by mouth daily      Coenzyme Q10 10 MG CAPS Take by mouth      levothyroxine (SYNTHROID) 100 MCG tablet Take 100 mcg by mouth every morning (before breakfast) nitroGLYCERIN (NITROSTAT) 0.4 MG SL tablet Place 0.4 mg under the tongue       No current facility-administered medications for this visit. No family history on file. Return for endoscopy. An electronic signature was used to authenticate this note.     --Dash Hood MD

## 2022-10-03 NOTE — PROGRESS NOTES
Marianna Person (:  1955) is a 74 y.o. male, new patient here for evaluation of the following chief complaint(s): Other (When eating getting full very fast), Gastroesophageal Reflux (Burning sensation in the back of throat ), and EGD         ASSESSMENT/PLAN:  1. GERD without esophagitis  2. Cough in adult  3. Early satiety           Subjective   SUBJECTIVE/OBJECTIVE  Patient with long history of reflux disease with hiatal hernia on proton pump inhibitor presenting with recurrent dry cough. His pulmonary evaluation was unremarkable. He is status post pacemaker placement. Has been experiencing worsening burning sensation in his chest from his reflux disease up to 5-month ago his symptoms were controlled on proton pump inhibitor. Denied any dysphagia. He admits to early satiety. Denied any melena weight loss. No recent endoscopic evaluation. Past Medical History:   Diagnosis Date    Asthma     Chest pain 3/29/2013    Edema 2017    Erectile dysfunction 2017    GERD (gastroesophageal reflux disease) 3/29/2013    Hypothyroidism 3/29/2013       Past Surgical History:   Procedure Laterality Date    BRONCHOSCOPY N/A 2022    BRONCHOSCOPY/BAL performed by Luiz Le MD at [place] [address]  2013    CARDIAC PROCEDURE N/A 2022    Left heart cath / coronary angiography performed by Laurent Jose MD at [facility] CARDIAC CATH LAB    PACEMAKER INSERTION               Allergies   Allergen Reactions    Crestor [Rosuvastatin Calcium] Myalgia     Leg pain    Lipitor [Atorvastatin] Myalgia     Leg pain    Amlodipine Swelling    Zetia [Ezetimibe] Myalgia     Leg pain            Review of Systems   Constitutional:  Negative for appetite change. HENT:  Negative for mouth sores and trouble swallowing. Respiratory:  Positive for cough. Negative for shortness of breath. Cardiovascular:  Negative for chest pain. Gastrointestinal:  Positive for nausea. Negative for abdominal pain, blood in stool and vomiting. Skin:  Negative for color change. Allergic/Immunologic: Negative for food allergies. Neurological:  Negative for seizures and weakness. Hematological:  Does not bruise/bleed easily. Objective   Physical Exam  HENT:      Head: Normocephalic. Mouth/Throat:      Mouth: Mucous membranes are moist.   Eyes:      General: No scleral icterus. Cardiovascular:      Rate and Rhythm: Normal rate and regular rhythm. Pulmonary:      Effort: No respiratory distress. Abdominal:      General: There is no distension. Tenderness: There is no abdominal tenderness. There is no rebound. Lymphadenopathy:      Cervical: No cervical adenopathy. Skin:     Coloration: Skin is not jaundiced. Findings: No bruising. Neurological:      General: No focal deficit present. Mental Status: He is alert. Current Outpatient Medications   Medication Sig Dispense Refill    fluticasone-salmeterol (ADVAIR DISKUS) 250-50 MCG/ACT AEPB diskus inhaler Inhale 1 puff into the lungs in the morning and 1 puff in the evening. 3 each 3    Alirocumab 150 MG/ML SOAJ Inject 150 mg into the skin every 14 days 6 Adjustable Dose Pre-filled Pen Syringe 3    valsartan-hydroCHLOROthiazide (DIOVAN HCT) 160-12.5 MG per tablet Take 1 tablet by mouth daily 30 tablet 3    albuterol sulfate HFA (PROVENTIL;VENTOLIN;PROAIR) 108 (90 Base) MCG/ACT inhaler       allopurinol (ZYLOPRIM) 300 MG tablet Take 300 mg by mouth in the morning. colchicine (COLCRYS) 0.6 MG tablet Take 0.6 mg by mouth as needed      pantoprazole (PROTONIX) 40 MG tablet TAKE ONE TABLET BY MOUTH ONE TIME DAILY      montelukast (SINGULAIR) 10 MG tablet Take 1 tablet by mouth in the morning.  30 tablet 3    aspirin 81 MG EC tablet Take 81 mg by mouth daily      Coenzyme Q10 10 MG CAPS Take by mouth      levothyroxine (SYNTHROID) 100 MCG tablet Take 100 mcg by mouth every morning (before breakfast) nitroGLYCERIN (NITROSTAT) 0.4 MG SL tablet Place 0.4 mg under the tongue       No current facility-administered medications for this visit. No family history on file. Return for endoscopy. An electronic signature was used to authenticate this note.     --Valorie Flores MD

## 2022-10-04 ENCOUNTER — PREP FOR PROCEDURE (OUTPATIENT)
Dept: GASTROENTEROLOGY | Age: 67
End: 2022-10-04

## 2022-10-04 ENCOUNTER — TELEPHONE (OUTPATIENT)
Dept: CARDIOLOGY CLINIC | Age: 67
End: 2022-10-04

## 2022-10-04 PROBLEM — R68.81 EARLY SATIETY: Status: ACTIVE | Noted: 2022-10-04

## 2022-10-04 PROBLEM — R05.9 COUGH IN ADULT: Status: ACTIVE | Noted: 2022-10-04

## 2022-10-05 RX ORDER — SODIUM CHLORIDE 0.9 % (FLUSH) 0.9 %
5-40 SYRINGE (ML) INJECTION PRN
Status: CANCELLED | OUTPATIENT
Start: 2022-10-05

## 2022-10-05 RX ORDER — SODIUM CHLORIDE 9 MG/ML
25 INJECTION, SOLUTION INTRAVENOUS PRN
Status: CANCELLED | OUTPATIENT
Start: 2022-10-05

## 2022-10-05 RX ORDER — SODIUM CHLORIDE 0.9 % (FLUSH) 0.9 %
5-40 SYRINGE (ML) INJECTION EVERY 12 HOURS SCHEDULED
Status: CANCELLED | OUTPATIENT
Start: 2022-10-05

## 2022-10-06 NOTE — PERIOP NOTE
Patient verified name, , and procedure. Type: 1a; abbreviated assessment per anesthesia guidelines  Labs per surgeon: none  Labs per anesthesia: none      Instructed pt that they will be notified by the doctor office for time of arrival to GI lab. If any questions please call the GI lab at [phone]. Follow diet and prep instructions per office. May have clear liquids until 4 hours prior to time of arrival.    [place] [employer] or shower the night before and the am of surgery with antibacterial soap. No lotions, oils, powders, cologne on skin. No make up, eye make up or jewelry. Wear loose fitting comfortable, clean clothing. Must have adult present in building the entire time . Medications for the day of procedure: use Advair inhaler and bring both inhalers to hospital DOS, Levothyroxine, Singulair. Patient has [employer] pacemaker. EKG (22), ECHO (22), Stress test (22), cath report (22), pacer check (22) and most recent cardiology office note (22) in EMR if needed for anesthesia review. The following discharge instructions reviewed with patient: medication given during procedure may cause drowsiness for several hours, therefore, do not drive or operate machinery for remainder of the day, no alcohol on the day of your procedure, resume regular diet and activity unless otherwise directed, for mild sore throat you may use Cepacol throat lozenges or warm salt water gargles as needed, call your physician for any problems or questions. Patient verbalizes understanding.

## 2022-10-07 ENCOUNTER — APPOINTMENT (OUTPATIENT)
Dept: CT IMAGING | Age: 67
End: 2022-10-07
Payer: MEDICARE

## 2022-10-07 ENCOUNTER — HOSPITAL ENCOUNTER (OUTPATIENT)
Dept: CT IMAGING | Age: 67
Discharge: HOME | End: 2022-10-10
Payer: MEDICARE

## 2022-10-07 DIAGNOSIS — R91.8 PULMONARY INFILTRATES: ICD-10-CM

## 2022-10-07 DIAGNOSIS — R10.9 ABDOMINAL PAIN, UNSPECIFIED ABDOMINAL LOCATION: ICD-10-CM

## 2022-10-07 DIAGNOSIS — R06.02 SHORTNESS OF BREATH ON EXERTION: ICD-10-CM

## 2022-10-07 DIAGNOSIS — R14.0 ABDOMINAL BLOATING: ICD-10-CM

## 2022-10-07 DIAGNOSIS — I44.2 COMPLETE HEART BLOCK (HCC): ICD-10-CM

## 2022-10-07 PROCEDURE — 71250 CT THORAX DX C-: CPT

## 2022-10-07 PROCEDURE — 6360000004 HC RX CONTRAST MEDICATION: Performed by: INTERNAL MEDICINE

## 2022-10-07 PROCEDURE — 74150 CT ABDOMEN W/O CONTRAST: CPT

## 2022-10-07 RX ADMIN — DIATRIZOATE MEGLUMINE AND DIATRIZOATE SODIUM 15 ML: 660; 100 LIQUID ORAL; RECTAL at 08:44

## 2022-10-09 ENCOUNTER — TELEPHONE (OUTPATIENT)
Dept: PULMONOLOGY | Age: 67
End: 2022-10-09

## 2022-10-09 NOTE — TELEPHONE ENCOUNTER
Please let the patient know, I did look at the patient's new CAT scan and still has infiltrates. Tell him we did extensive work-up last time which was negative and he did get better from a clinical standpoint with treatment. Tell him the next option to ascertain what the infiltrates are would be to get a surgical biopsy but I will run it by my colleagues to see if they want to do a transbronchial biopsy or run it for any other stains before we send him to surgery for an open lung biopsy. I did see notes from gastroenterology and he does have GERD with esophagitis. ? Could these be the findings on the CT which is likely due to lung scarring? .  Since the work-up below is unremarkable. Studies done include:  Blastomycosis - negative  Histoplasmosis antigen urine-negative  HIV negative      Bronchial Lavage from July 2022  Differential 94% lymphocytes. Cytology-negative  Respiratory culture-negative  AFB culture and smear-negative  Atypical pneumonia-negative   Aspergillus-negative  Varicella-zoster-negative. Viral culture-negative  Fungal culture-negative    Please let him know, I'll call him this well with update.  Thanks    Luis Berumen MD

## 2022-10-11 ENCOUNTER — ANESTHESIA (OUTPATIENT)
Dept: ENDOSCOPY | Age: 67
End: 2022-10-11
Payer: MEDICARE

## 2022-10-11 ENCOUNTER — ANESTHESIA EVENT (OUTPATIENT)
Dept: ENDOSCOPY | Age: 67
End: 2022-10-11
Payer: MEDICARE

## 2022-10-11 ENCOUNTER — HOSPITAL ENCOUNTER (OUTPATIENT)
Age: 67
Setting detail: OUTPATIENT SURGERY
Discharge: HOME | End: 2022-10-11
Attending: INTERNAL MEDICINE | Admitting: INTERNAL MEDICINE
Payer: MEDICARE

## 2022-10-11 VITALS
RESPIRATION RATE: 18 BRPM | WEIGHT: 204.8 LBS | TEMPERATURE: 97.9 F | HEART RATE: 65 BPM | SYSTOLIC BLOOD PRESSURE: 140 MMHG | DIASTOLIC BLOOD PRESSURE: 84 MMHG | OXYGEN SATURATION: 99 % | BODY MASS INDEX: 30.69 KG/M2

## 2022-10-11 DIAGNOSIS — R05.9 COUGH IN ADULT: ICD-10-CM

## 2022-10-11 DIAGNOSIS — R68.81 EARLY SATIETY: ICD-10-CM

## 2022-10-11 LAB — POTASSIUM BLD-SCNC: 4.1 MMOL/L (ref 3.5–5.1)

## 2022-10-11 PROCEDURE — 2709999900 HC NON-CHARGEABLE SUPPLY: Performed by: INTERNAL MEDICINE

## 2022-10-11 PROCEDURE — 7100000010 HC PHASE II RECOVERY - FIRST 15 MIN: Performed by: INTERNAL MEDICINE

## 2022-10-11 PROCEDURE — 88305 TISSUE EXAM BY PATHOLOGIST: CPT

## 2022-10-11 PROCEDURE — 3609012400 HC EGD TRANSORAL BIOPSY SINGLE/MULTIPLE: Performed by: INTERNAL MEDICINE

## 2022-10-11 PROCEDURE — 3700000000 HC ANESTHESIA ATTENDED CARE: Performed by: INTERNAL MEDICINE

## 2022-10-11 PROCEDURE — 84132 ASSAY OF SERUM POTASSIUM: CPT

## 2022-10-11 PROCEDURE — 7100000011 HC PHASE II RECOVERY - ADDTL 15 MIN: Performed by: INTERNAL MEDICINE

## 2022-10-11 PROCEDURE — 2580000003 HC RX 258: Performed by: INTERNAL MEDICINE

## 2022-10-11 PROCEDURE — 6360000002 HC RX W HCPCS: Performed by: NURSE ANESTHETIST, CERTIFIED REGISTERED

## 2022-10-11 PROCEDURE — 88312 SPECIAL STAINS GROUP 1: CPT

## 2022-10-11 RX ORDER — SODIUM CHLORIDE 0.9 % (FLUSH) 0.9 %
5-40 SYRINGE (ML) INJECTION PRN
Status: DISCONTINUED | OUTPATIENT
Start: 2022-10-11 | End: 2022-10-11 | Stop reason: HOSPADM

## 2022-10-11 RX ORDER — PROPOFOL 10 MG/ML
INJECTION, EMULSION INTRAVENOUS PRN
Status: DISCONTINUED | OUTPATIENT
Start: 2022-10-11 | End: 2022-10-11 | Stop reason: SDUPTHER

## 2022-10-11 RX ORDER — SODIUM CHLORIDE 0.9 % (FLUSH) 0.9 %
5-40 SYRINGE (ML) INJECTION EVERY 12 HOURS SCHEDULED
Status: DISCONTINUED | OUTPATIENT
Start: 2022-10-11 | End: 2022-10-11 | Stop reason: HOSPADM

## 2022-10-11 RX ORDER — SODIUM CHLORIDE 9 MG/ML
25 INJECTION, SOLUTION INTRAVENOUS PRN
Status: DISCONTINUED | OUTPATIENT
Start: 2022-10-11 | End: 2022-10-11 | Stop reason: HOSPADM

## 2022-10-11 RX ORDER — SODIUM CHLORIDE, SODIUM LACTATE, POTASSIUM CHLORIDE, CALCIUM CHLORIDE 600; 310; 30; 20 MG/100ML; MG/100ML; MG/100ML; MG/100ML
INJECTION, SOLUTION INTRAVENOUS CONTINUOUS
Status: DISCONTINUED | OUTPATIENT
Start: 2022-10-11 | End: 2022-10-11 | Stop reason: HOSPADM

## 2022-10-11 RX ADMIN — PROPOFOL 100 MG: 10 INJECTION, EMULSION INTRAVENOUS at 09:48

## 2022-10-11 RX ADMIN — SODIUM CHLORIDE, POTASSIUM CHLORIDE, SODIUM LACTATE AND CALCIUM CHLORIDE: 600; 310; 30; 20 INJECTION, SOLUTION INTRAVENOUS at 09:24

## 2022-10-11 RX ADMIN — PROPOFOL 200 MCG/KG/MIN: 10 INJECTION, EMULSION INTRAVENOUS at 09:50

## 2022-10-11 ASSESSMENT — PAIN - FUNCTIONAL ASSESSMENT: PAIN_FUNCTIONAL_ASSESSMENT: 0-10

## 2022-10-11 NOTE — DISCHARGE INSTRUCTIONS
Upper GI Endoscopy: What to Expect at [address] had an upper GI endoscopy. Your doctor used a thin, lighted tube that bends to look at the inside of your esophagus, your stomach, and the first part of the small intestine, called the duodenum. After you have an endoscopy, you will stay at the hospital or clinic for 1 to 2 hours. This will allow the medicine to wear off. You will be able to go home after your doctor or nurse checks to make sure that you're not having any problems. You may have to stay overnight if you had treatment during the test. You may have a sore throat for a day or two after the test.  This care sheet gives you a general idea about what to expect after the test.  How can you care for yourself at home? Activity   Rest as much as you need to after you go home. You should be able to go back to your usual activities the day after the test.  Diet   Follow your doctor's directions for eating after the test.  Drink plenty of fluids (unless your doctor has told you not to). Medications   If you have a sore throat the day after the test, use an over-the-counter spray to numb your throat. Follow-up care is a key part of your treatment and safety. Be sure to make and go to all appointments, and call your doctor if you are having problems. It's also a good idea to know your test results and keep a list of the medicines you take. When should you call for help? Call 911 anytime you think you may need emergency care. For example, call if:    You passed out (lost consciousness). You have trouble breathing. You pass maroon or bloody stools. Call your doctor now or seek immediate medical care if:    You have pain that does not get better after your take pain medicine. You have new or worse belly pain. You have blood in your stools. You are sick to your stomach and cannot keep fluids down. You have a fever. You cannot pass stools or gas.    Watch closely for changes in your health, and be sure to contact your doctor if:    Your throat still hurts after a day or two. You do not get better as expected. Where can you learn more? Go to https://"[employer]. org and sign in to your [employer]LikeMe account. Enter D042 in the [facility] box to learn more about \"Upper GI Endoscopy: What to Expect at Home. \"     If you do not have an account, please click on the \"Sign Up Now\" link. Current as of: June 6, 2022               Content Version: 13.4  © 0156-3886 [employer]wise, Incorporated. Care instructions adapted under license by [facility] ([facility] and the [facility]). If you have questions about a medical condition or this instruction, always ask your healthcare professional. [address] any warranty or liability for your use of this information.

## 2022-10-11 NOTE — PROCEDURES
Endoscopy Note          Operative Report    Patient: Jesusita Melgoza MRN: [id number]      YOB: 1955  Age: 74 y.o. Sex: male            Indications:   Chronic reflux disease, cough    Preoperative Evaluation: The patient was evaluated prior to the procedure in the GI lab admission area, the patient ASA was recorded . Consent was obtained from the patient with the risk of perforation bleeding and aspiration. Anesthesia: SARAH-per anesthesia    Findings: Normal upper and mid and distal esophageal mucosa no sign of esophagitis ulceration. Mild generalized chronic gastritis biopsy from the antrum and gastric body was taken. Open pylorus. Normal duodenal mucosa.     Postoperative Diagnosis: Chronic gastritis    [address] Esophagogastroduodenoscopy, Flexible, transoral; biopsy; single or multiple      Recommendations: Await Pathology      Signed By:  Homero Celis MD     October 11, 2022

## 2022-10-11 NOTE — ANESTHESIA PRE PROCEDURE
Department of Anesthesiology  Preprocedure Note       Name:  Brianne Bills   Age:  74 y.o.  :  1955                                          MRN:  [id number]         Date:  10/11/2022      Surgeon: Brennon Wan):  Rosetta Akers MD    Procedure: Procedure(s):  EGD ESOPHAGOGASTRODUODENOSCOPY/Pt has PACEMAKER    Medications prior to admission:   Prior to Admission medications    Medication Sig Start Date End Date Taking? Authorizing Provider   fluticasone-salmeterol (ADVAIR DISKUS) 250-50 MCG/ACT AEPB diskus inhaler Inhale 1 puff into the lungs in the morning and 1 puff in the evening. 22   SHAY Vela CNP   Alirocumab 150 MG/ML SOAJ Inject 150 mg into the skin every 14 days 22   Sean Fontenot MD   valsartan-hydroCHLOROthiazide (DIOVAN HCT) 160-12.5 MG per tablet Take 1 tablet by mouth daily 22   Sean Fontenot MD   albuterol sulfate HFA (PROVENTIL;VENTOLIN;PROAIR) 108 (90 Base) MCG/ACT inhaler  22   Historical Provider, MD   allopurinol (ZYLOPRIM) 300 MG tablet Take 300 mg by mouth in the morning.     Historical Provider, MD   colchicine (COLCRYS) 0.6 MG tablet Take 0.6 mg by mouth as needed    Historical Provider, MD   pantoprazole (PROTONIX) 40 MG tablet TAKE ONE TABLET BY MOUTH ONE TIME DAILY 22   Historical Provider, MD   montelukast (SINGULAIR) 10 MG tablet Take 1 tablet by mouth in the morning. 22   SHAY Vela CNP   aspirin 81 MG EC tablet Take 81 mg by mouth daily 3/28/22   Ar Automatic Reconciliation   Coenzyme Q10 10 MG CAPS Take by mouth    Ar Automatic Reconciliation   levothyroxine (SYNTHROID) 100 MCG tablet Take 100 mcg by mouth every morning (before breakfast)    Ar Automatic Reconciliation   nitroGLYCERIN (NITROSTAT) 0.4 MG SL tablet Place 0.4 mg under the tongue 3/30/13   Ar Automatic Reconciliation       Current medications:    Current Facility-Administered Medications   Medication Dose Route Frequency Provider Last Rate Last Admin    sodium chloride flush 0.9 % injection 5-40 mL  5-40 mL IntraVENous 2 times per day Reza Nichols MD        sodium chloride flush 0.9 % injection 5-40 mL  5-40 mL IntraVENous PRN Reza Nichols MD        0.9 % sodium chloride infusion  25 mL IntraVENous PRN Reza Nichols MD           Allergies: Allergies   Allergen Reactions    Crestor [Rosuvastatin Calcium] Myalgia     Leg pain    Lipitor [Atorvastatin] Myalgia     Leg pain    Amlodipine Swelling    Zetia [Ezetimibe] Myalgia     Leg pain         Problem List:    Patient Active Problem List   Diagnosis Code    Allergic rhinitis J30.9    Dyslipidemia E78.5    Coronary artery disease involving native coronary artery of native heart with angina pectoris ([facility] [address] I25.119    Primary hypertension I10    RBBB I45.10    Complete heart block (HCC) I44.2    Amaurosis fugax of right eye G45.3    Pacemaker Z95.0    Pulmonary infiltrates R91.8    GERD (gastroesophageal reflux disease) K21.9    SUSANNE (obstructive sleep apnea) G47.33    Cough in adult R05.9    Early satiety R68.81       Past Medical History:        Diagnosis Date    Asthma     inhaler daily and prn    Chest pain 3/29/2013    Edema 1/16/2017    Erectile dysfunction 1/16/2017    GERD (gastroesophageal reflux disease) 3/29/2013    Hypothyroidism 3/29/2013       Past Surgical History:        Procedure Laterality Date    BRONCHOSCOPY N/A 07/25/2022    BRONCHOSCOPY/BAL performed by Genevieve Mcpherson MD at [place] [address]  03/29/2013    x 1 stent    CARDIAC PROCEDURE N/A 09/13/2022    Left heart cath / coronary angiography performed by Shanti Wade [facility] CARDIAC CATH LAB    PACEMAKER INSERTION         Social History:    Social History     Tobacco Use    Smoking status: Never    Smokeless tobacco: Never   Substance Use Topics    Alcohol use:  Yes     Alcohol/week: 20.0 standard drinks     Types: 20 Drinks containing 0.5 oz of alcohol per week Counseling given: Not Answered      Vital Signs (Current): There were no vitals filed for this visit. BP Readings from Last 3 Encounters:   09/13/22 (!) 140/74   09/13/22 (!) 140/80   09/08/22 136/80       NPO Status:                                                                                 BMI:   Wt Readings from Last 3 Encounters:   10/03/22 203 lb (92.1 kg)   09/13/22 203 lb (92.1 kg)   09/08/22 202 lb 9.6 oz (91.9 kg)     There is no height or weight on file to calculate BMI.    CBC:   Lab Results   Component Value Date/Time    WBC 5.3 09/08/2022 09:33 AM    RBC 4.56 09/08/2022 09:33 AM    HGB 15.1 09/08/2022 09:33 AM    HCT 44.3 09/08/2022 09:33 AM    MCV 97.1 09/08/2022 09:33 AM    RDW 12.5 09/08/2022 09:33 AM     09/08/2022 09:33 AM       CMP:   Lab Results   Component Value Date/Time     09/08/2022 09:33 AM    K 4.5 09/08/2022 09:33 AM     09/08/2022 09:33 AM    CO2 30 09/08/2022 09:33 AM    BUN 18 09/08/2022 09:33 AM    CREATININE 1.00 09/08/2022 09:33 AM    GFRAA >60 09/08/2022 09:33 AM    AGRATIO 1.0 05/01/2022 06:14 PM    LABGLOM >60 09/08/2022 09:33 AM    GLUCOSE 107 09/08/2022 09:33 AM    PROT 7.8 05/01/2022 06:14 PM    CALCIUM 9.6 09/08/2022 09:33 AM    BILITOT 1.1 05/01/2022 06:14 PM    ALKPHOS 53 05/01/2022 06:14 PM    AST 38 05/01/2022 06:14 PM    ALT 71 05/01/2022 06:14 PM       POC Tests: No results for input(s): POCGLU, POCNA, POCK, POCCL, POCBUN, POCHEMO, POCHCT in the last 72 hours.     Coags:   Lab Results   Component Value Date/Time    PROTIME 12.4 09/08/2022 09:30 AM    INR 0.9 09/08/2022 09:30 AM       HCG (If Applicable): No results found for: PREGTESTUR, PREGSERUM, HCG, HCGQUANT     ABGs: No results found for: PHART, PO2ART, OJQ2GKF, JYC5LAA, BEART, T5AMOXHZ     Type & Screen (If Applicable):  No results found for: LABABO, LABRH    Drug/Infectious Status (If Applicable):  No results found for: HIV, HEPCAB    COVID-19 Screening (If Applicable): No results found for: COVID19        Anesthesia Evaluation  Patient summary reviewed and Nursing notes reviewed  Airway: Mallampati: I  TM distance: >3 FB   Neck ROM: full  Mouth opening: > = 3 FB   Dental: normal exam         Pulmonary: breath sounds clear to auscultation  (+) sleep apnea:  asthma:                            Cardiovascular:  Exercise tolerance: good (>4 METS),   (+) hypertension: moderate, pacemaker (SSS): pacemaker, CAD (RCA stent - on baby aspirin):, CABG/stent (Stent RCA 2013):, hyperlipidemia        Rhythm: regular  Rate: normal                    Neuro/Psych:   (+) TIA,              ROS comment: Amaurosis fugax of right eye GI/Hepatic/Renal:   (+) GERD: poorly controlled,           Endo/Other:    (+) hypothyroidism::., .                 Abdominal:             Vascular: negative vascular ROS. Other Findings:           Anesthesia Plan      TIVA     ASA 3       Induction: intravenous. Anesthetic plan and risks discussed with patient and spouse.                         Adelita Carpenter MD   10/11/2022

## 2022-10-11 NOTE — ANESTHESIA POSTPROCEDURE EVALUATION
Department of Anesthesiology  Postprocedure Note    Patient: Brice Giordano  MRN: [id number]  YOB: 1955  Date of evaluation: 10/11/2022      Procedure Summary     Date: 10/11/22 Room / Location: [facility] ENDO 01 / [facility] ENDOSCOPY    Anesthesia Start: 0945 Anesthesia Stop: 1001    Procedure: EGD/ BIOPSY (Upper GI Region) Diagnosis:       GERD without esophagitis      Cough in adult      Early satiety      (GERD without esophagitis [K21.9])      (Cough in adult [R05.9])      (Early satiety [R68.81])    Providers: Abril Armas MD Responsible Provider: Toña Donahue MD    Anesthesia Type: TIVA ASA Status: 3          Anesthesia Type: No value filed.     Marily Phase I:      Marily Phase II: Marily Score: 8      Anesthesia Post Evaluation    Patient location during evaluation: PACU  Patient participation: complete - patient participated  Level of consciousness: awake and alert  Pain score: 0  Airway patency: patent  Nausea & Vomiting: no nausea and no vomiting  Complications: no  Cardiovascular status: hemodynamically stable  Respiratory status: acceptable, nonlabored ventilation and spontaneous ventilation  Hydration status: euvolemic  Comments: /71   Pulse 64   Temp 97.9 °F (36.6 °C) (Temporal)   Resp 15   Wt 204 lb 12.8 oz (92.9 kg)   SpO2 98%   BMI 30.69 kg/m²     Multimodal analgesia pain management approach

## 2022-10-11 NOTE — INTERVAL H&P NOTE
Update History & Physical    The patient's History and Physical of October 3,  was reviewed with the patient and I examined the patient. There was no change. The surgical site was confirmed by the patient and me. Plan: The risks, benefits, expected outcome, and alternative to the recommended procedure have been discussed with the patient. Patient understands and wants to proceed with the procedure.      Electronically signed by Kervin Mckinney MD on 10/11/2022 at 9:22 AM

## 2022-10-17 ENCOUNTER — PREP FOR PROCEDURE (OUTPATIENT)
Dept: PULMONOLOGY | Age: 67
End: 2022-10-17

## 2022-10-17 ENCOUNTER — TELEPHONE (OUTPATIENT)
Dept: PULMONOLOGY | Age: 67
End: 2022-10-17

## 2022-10-17 ENCOUNTER — TELEPHONE (OUTPATIENT)
Dept: SLEEP MEDICINE | Age: 67
End: 2022-10-17

## 2022-10-17 NOTE — TELEPHONE ENCOUNTER
Jess Berumen MA routed conversation to [facility] Pulmonary [facility] Critical Care Triage 3 days ago      Carie Serna MD  You; Lara Henry; Deuce Shah MD 4 days ago     NA  I called the patient and told him that the neck step would be to try to do the transbronchial biopsy. Bret or Pauline Melchor, can you Please set this up with fluoroscopy and with Dr. Ricarda Zabala or Dr. Nevaeh Tate. Will need some samples to make sure if this is sarcoid or not. I would also do a PAS stain to make sure this is not a lipoid pneumonia of some variety. I did tell him all the results were negative. I would do another washing and this time around do CD4/CD8 count as well. That was not done last time. Thanks Mari Workman for looking this up as well. Dr. Santos English    I have spoken with patient. He is agreeable to bronch with BAL and TBBX on 10/27 with Dr Popeye Loco. He will arrive at 0730 on 10/27. He confirms no blood thinners with exception of 81 mg asa. He will take am blood pressure medication with a small sip of water but will otherwise be NPO.

## 2022-10-17 NOTE — TELEPHONE ENCOUNTER
Spoke to patient and he is going to call me back on where to got machine and possible studies so I can try to get sleep studies.

## 2022-10-18 ENCOUNTER — OFFICE VISIT (OUTPATIENT)
Dept: SLEEP MEDICINE | Age: 67
End: 2022-10-18

## 2022-10-18 VITALS
TEMPERATURE: 96.9 F | DIASTOLIC BLOOD PRESSURE: 70 MMHG | HEART RATE: 67 BPM | RESPIRATION RATE: 16 BRPM | SYSTOLIC BLOOD PRESSURE: 130 MMHG | WEIGHT: 207.5 LBS | OXYGEN SATURATION: 96 % | HEIGHT: 68 IN | BODY MASS INDEX: 31.45 KG/M2

## 2022-10-18 DIAGNOSIS — I44.2 COMPLETE HEART BLOCK (HCC): ICD-10-CM

## 2022-10-18 DIAGNOSIS — J30.89 NON-SEASONAL ALLERGIC RHINITIS, UNSPECIFIED TRIGGER: ICD-10-CM

## 2022-10-18 DIAGNOSIS — G47.33 OSA (OBSTRUCTIVE SLEEP APNEA): Primary | ICD-10-CM

## 2022-10-18 DIAGNOSIS — E66.9 OBESITY (BMI 30-39.9): ICD-10-CM

## 2022-10-18 DIAGNOSIS — I10 PRIMARY HYPERTENSION: ICD-10-CM

## 2022-10-18 PROCEDURE — 3017F COLORECTAL CA SCREEN DOC REV: CPT | Performed by: INTERNAL MEDICINE

## 2022-10-18 PROCEDURE — G8417 CALC BMI ABV UP PARAM F/U: HCPCS | Performed by: INTERNAL MEDICINE

## 2022-10-18 PROCEDURE — 1123F ACP DISCUSS/DSCN MKR DOCD: CPT | Performed by: INTERNAL MEDICINE

## 2022-10-18 PROCEDURE — 99205 OFFICE O/P NEW HI 60 MIN: CPT | Performed by: INTERNAL MEDICINE

## 2022-10-18 PROCEDURE — G8484 FLU IMMUNIZE NO ADMIN: HCPCS | Performed by: INTERNAL MEDICINE

## 2022-10-18 PROCEDURE — G8427 DOCREV CUR MEDS BY ELIG CLIN: HCPCS | Performed by: INTERNAL MEDICINE

## 2022-10-18 PROCEDURE — 1036F TOBACCO NON-USER: CPT | Performed by: INTERNAL MEDICINE

## 2022-10-18 ASSESSMENT — SLEEP AND FATIGUE QUESTIONNAIRES
NECK CIRCUMFERENCE (INCHES): 19
HOW LIKELY ARE YOU TO NOD OFF OR FALL ASLEEP WHILE SITTING AND TALKING TO SOMEONE: 0
HOW LIKELY ARE YOU TO NOD OFF OR FALL ASLEEP WHILE SITTING AND READING: 2
HOW LIKELY ARE YOU TO NOD OFF OR FALL ASLEEP WHILE SITTING INACTIVE IN A PUBLIC PLACE: 0
ESS TOTAL SCORE: 10
HOW LIKELY ARE YOU TO NOD OFF OR FALL ASLEEP WHILE WATCHING TV: 3
HOW LIKELY ARE YOU TO NOD OFF OR FALL ASLEEP WHILE SITTING QUIETLY AFTER LUNCH WITHOUT ALCOHOL: 0
HOW LIKELY ARE YOU TO NOD OFF OR FALL ASLEEP WHEN YOU ARE A PASSENGER IN A CAR FOR AN HOUR WITHOUT A BREAK: 2
HOW LIKELY ARE YOU TO NOD OFF OR FALL ASLEEP WHILE LYING DOWN TO REST IN THE AFTERNOON WHEN CIRCUMSTANCES PERMIT: 3
HOW LIKELY ARE YOU TO NOD OFF OR FALL ASLEEP IN A CAR, WHILE STOPPED FOR A FEW MINUTES IN TRAFFIC: 0

## 2022-10-18 NOTE — ASSESSMENT & PLAN NOTE
Patient's blood pressure is  in control today. Relationship with hypertension and untreated sleep apnea discussed. Blood pressure may improve with successful treatment of obstructive sleep apnea.

## 2022-10-18 NOTE — ASSESSMENT & PLAN NOTE
This is much better controlled today with patent nares, he is currently on Singulair with Flonase and Claritin, the latter 2 being taken as needed. Maintaining upper airway patency is important if he is to be successful with nasal Pap therapy. He will go ahead and try the oronasal mask between now and next visit.

## 2022-10-18 NOTE — PATIENT INSTRUCTIONS
It was a pleasure meeting you today. Here are some items that we discussed:    1. Go ahead and use your new CPAP oronasal mask and try to wear your CPAP for 5 to 6 hours every night over the next 4 weeks, we will discuss future treatment options at next visit. Please bring in your machine again at follow-up. 2.   We discussed alternative treatments including surgery, weight loss, oral appliance therapy by a sleep dentist as well as no treatment. We will discuss this at next visit, if we elect to do no treatment we will really need to recheck a home test to make sure you have not developed into moderate or severe disease. 3.   I am glad to hear your nasal congestion is well controlled, if this gets worse or you have any other sleep-related concerns, please give us a call. Sweta Mckeon MD  [phone]     Please continue to use your CPAPYou are getting a good response with it. To get the best benefits from CPAP therapy you will need to use your device for all periods of sleep, this includes naps. Please do not drive if you are sleepy. I will see you back in 1 months. See your Durable Medical Equipment (DME) provider: Cher Sierra  For any mask fit issues and equipment replacement  Use it every time you go to sleep, day or night. Replace your mask cushion, headgear, and filters regularly. Clean your mask daily and the rest every one to two weeks. If any other issues or questions about your use of CPAP, mask, or pressure, arise, please call your medical equipment company. If you are still having issues, please  use [employer] or call to contact our sleep clinic so that we can assist you. If you have issues that are not about your CPAP (example: medication refills or side effects), please call the  at [phone]. Please work on maintaining a healthy weight. A BMI <30 is considered a healthy weight. Current Body mass index is 31.55 kg/m². . If you are overweight, a loss of 2 pounds per month still comes out to about 25 pounds per year and could allow a reduction in CPAP pressure or even discontinuation of CPAP. Cleaning instructions      Daily:  Cushion on mask: wash with soap/water OR wipe with an alcohol-free baby wipe    Once per Week:  Mask (frame/headgear): wash with soap/water   Filter: check for cleanliness, Replace (do not wash) monthly, or more often whenever bo/dirty    Twice a month   Water Tub / Tubing: soak for 20-30 minutes in solution then rinse          Soaking Options: 1) water/vinegar solution (3 parts water, 1 part vinegar)     Some companies may tell you to add a little bit of bleach. However I find that bleach to be too toxic and difficult to rinse adequately. Supply Replacement Schedule (what insurance will cover)    You may not need to replace all parts this frequently if you are doing proper cleaning. Filters: 2 per month  Nasal Cushion/Pillow: 2 per month  Full Face cushion: 1 per month  Tubin every 3 months  Full Mask: 1 every 6 months  Headgear: 1 every 6 months  Water Chamber: 1 every 6 months  Chinstrap: 1 every 6 months      Many patients struggle to find comfortable heat and humidity settings on their device. If you are having issues with condensation in your mask or tubing, it usually means your temperature is too low, and/or your humidity is too high. If you are experiencing nasal congestion, it usually means you would benefit from higher humidity setting. Most people on nasal pillows are more comfortable if the heat and humidity are relatively high, such as 84-86 degrees, and 5-6 humidity. You can adjust these settings yourself to find what is comfortable for you. You can always call us or your equipment company for help, as well. Contact your equipment vendor for replacement supplies whenever in need.

## 2022-10-18 NOTE — ASSESSMENT & PLAN NOTE
Patient has mild obstructive sleep apnea with mild oxygen desaturations, has not done well with CPAP therapy over the years, unfortunately he did not try his new mask yet from last visit. We discussed pathophysiology of obstructive sleep apnea and technically treatment for him would be optional if his sleep quality and daytime function are at goal.  We did discuss he could have progressed slightly with weight gain although he is only gained 10 pounds since the last study. We went over options of oral appliance, surgery, ongoing CPAP versus no treatment and he would like to continue CPAP and give extra effort to using it every night for 1 month and we will make future treatment decisions at next visit. Should he elect to undergo no treatment I would like him to repeat HST given his history of cardiopulmonary disease. His home oxygen monitor also suggest he is not having significant desaturations during sleep off CPAP, he brought his data in for me to review today.

## 2022-10-18 NOTE — PROGRESS NOTES
[address] [place]  [phone]    PATIENT ID:  Mr. Estefany Alejandro   1955, 74 y.o. male  His primary provider is Rusty Salazar MD    CC: Mr. Moses Carvajal comes in for evaluation of his  New Patient and Sleep Apnea      HISTORY OF PRESENT ILLNESS  Mr. Moses Carvajal is a 74 y.o.  male referred for SUSANNE management by Dr. Montse Stroud. The patient has a medical history significant for coronary artery disease status post stenting, atrioventricular block status post pacemaker, hypertension, hyperlipidemia, obesity, moderate persistent asthma, allergic rhinitis, gastroesophageal reflux disease, fatty liver, gout, hypothyroidism. He was diagnosed in 2018 by home sleep testing with an AHI of 14 SPO2 ankush 86% he was 10 pounds lighter at that time, underwent subsequent CPAP titration recommendations CPAP 11 although this was the last pressure and he was only in rem sleep for 5 minutes at that pressure. He has struggled with tolerating CPAP since diagnosis and in fact has not used it much at all since last visit, he was given a FP Katelyn Mask or ro nasal but has not used it as he was on vacation. He sleeps alone with his bed at a slight incline, his wife sleeps in a separate room because of her snoring. He is a light sleeper and CPAP wakes him up frequently throughout the night and he gets nasal irritation from his nasal pillows but he has enjoyed that mask over the years the [facility]. He denies any regular insomnia, no restless leg symptomatology no hypnagogic or hypnopompic hallucinations or cataplexy. He will typically go to bed around 10 and is up by 4 AM most days, will take short 10-minute naps on purpose as power naps but denies significant daytime sleepiness or drowsy driving. His wife and his brother recently started CPAP and they are on him to use his CPAP, he does not receive any clinical benefit even when he sleeps all night with his CPAP. He also notes he does not have difficulty with memory or focus and his mood is stable. ROS notable for:  Placement of pacemaker for sudden onset AV block of unclear etiology in May of this year, he is also undergoing evaluation for pulmonary opacities and is to get a bronchioloalveolar lavage in the near future, his allergic rhinitis seems well controlled since the addition of Singulair and he is using Flonase and Claritin as needed when it is bothering him, he attributes some of his postnasal drip to one of his cholesterol medications as a known side effect. Sleep Medicine 10/18/2022   Sitting and reading 2   Watching TV 3   Sitting, inactive in a public place (e.g. a theatre or a meeting) 0   As a passenger in a car for an hour without a break 2   Lying down to rest in the afternoon when circumstances permit 3   Sitting and talking to someone 0   Sitting quietly after a lunch without alcohol 0   In a car, while stopped for a few minutes in traffic 0   [place] Sleepiness Score 10   Neck circumference (Inches) 19         Past Medical History:   Diagnosis Date    Asthma     inhaler daily and prn    Chest pain 3/29/2013    Edema 1/16/2017    Erectile dysfunction 1/16/2017    GERD (gastroesophageal reflux disease) 3/29/2013    Hypothyroidism 3/29/2013       Patient Active Problem List   Diagnosis    Allergic rhinitis    Dyslipidemia    Coronary artery disease involving native coronary artery of native heart with angina pectoris ([address]    Primary hypertension    RBBB    Complete heart block (HCC)    Amaurosis fugax of right eye    Pacemaker    Pulmonary infiltrates    GERD (gastroesophageal reflux disease)    SUSANNE (obstructive sleep apnea)    Cough in adult    Early satiety    Obesity (BMI 30-39. 9)       Past Surgical History:   Procedure Laterality Date    BRONCHOSCOPY N/A 07/25/2022    BRONCHOSCOPY/BAL performed by Noel Rodríguez MD at [place] [address]  03/29/2013    x 1 stent    CARDIAC PROCEDURE N/A 09/13/2022    Left heart cath / coronary angiography performed by Bisi Amin MD at [address] N/A 10/11/2022    EGD/ BIOPSY performed by Jose Batista MD at [address] History     Socioeconomic History    Marital status:      Spouse name: Not on file    Number of children: Not on file    Years of education: Not on file    Highest education level: Not on file   Occupational History    Not on file   Tobacco Use    Smoking status: Never    Smokeless tobacco: Never   Substance and Sexual Activity    Alcohol use: Yes     Alcohol/week: 20.0 standard drinks     Types: 20 Drinks containing 0.5 oz of alcohol per week    Drug use: Not on file    Sexual activity: Not on file   Other Topics Concern    Not on file   Social History Narrative    Patient lives in [facility] with his wife of 40 years he was born and raised on [place] and has several siblings still there. His daughter is a nurse practitioner at his primary care physician's office, he is a retired  and his last before [employer] was a  at [employer]. He does drink beers 3-4 on the weekend and usually 2 ounces of whiskey at night before bed, he has not drink caffeine in years because he feels like this made his seborrheic keratitis worse. No tobacco products but he does up very rarely try marijuana but not on a regular basis. Social Determinants of Health     Financial Resource Strain: Not on file   Food Insecurity: Not on file   Transportation Needs: Not on file   Physical Activity: Not on file   Stress: Not on file   Social Connections: Not on file   Intimate Partner Violence: Not on file   Housing Stability: Not on file         History reviewed. No pertinent family history. He has a family history significant for brother with SUSANNE, no history of narcolepsy insomnia or other sleep disorders.     Allergies   Allergen Reactions Crestor [Rosuvastatin Calcium] Myalgia     Leg pain    Lipitor [Atorvastatin] Myalgia     Leg pain    Amlodipine Swelling    Zetia [Ezetimibe] Myalgia     Leg pain           Current Outpatient Medications   Medication Sig    fluticasone-salmeterol (ADVAIR DISKUS) 250-50 MCG/ACT AEPB diskus inhaler Inhale 1 puff into the lungs in the morning and 1 puff in the evening. Alirocumab 150 MG/ML SOAJ Inject 150 mg into the skin every 14 days    valsartan-hydroCHLOROthiazide (DIOVAN HCT) 160-12.5 MG per tablet Take 1 tablet by mouth daily    albuterol sulfate HFA (PROVENTIL;VENTOLIN;PROAIR) 108 (90 Base) MCG/ACT inhaler     allopurinol (ZYLOPRIM) 300 MG tablet Take 300 mg by mouth in the morning. colchicine (COLCRYS) 0.6 MG tablet Take 0.6 mg by mouth as needed    pantoprazole (PROTONIX) 40 MG tablet TAKE ONE TABLET BY MOUTH ONE TIME DAILY    montelukast (SINGULAIR) 10 MG tablet Take 1 tablet by mouth in the morning. aspirin 81 MG EC tablet Take 81 mg by mouth daily    Coenzyme Q10 10 MG CAPS Take by mouth    levothyroxine (SYNTHROID) 100 MCG tablet Take 100 mcg by mouth every morning (before breakfast)    nitroGLYCERIN (NITROSTAT) 0.4 MG SL tablet Place 0.4 mg under the tongue     No current facility-administered medications for this visit. REVIEW OF SYSTEMS    Full sleep history questionnaire was reviewed with the patient and a 14 ROS obtained. Significant positive findings are indicated in the HPI. All other systems were reviewed and are negative. Significant negatives may be indicated in the HPI if pertinent to the present illness. PHYSICAL EXAM:    Vitals:    10/18/22 0953   BP: 130/70   Pulse: 67   Resp: 16   Temp: 96.9 °F (36.1 °C)   SpO2: 96%           GENERAL APPEARANCE:  The patient is normal weight and in no respiratory distress. HEENT: Conjunctivae unremarkable. Nasal mucosa is without epistaxis, exudate, or polyps.   Patient has good dentition, molar flattening noted, palate is normal. Mallampati 3, tonsils minimal.  No retrognathia, full goatee beard  NECK/LYMPHATIC:   Symmetrical with no elevation of jugular venous pulsation. Trachea midline. No thyroid enlargement. No cervical adenopathy. LUNGS:  Normal respiratory effort with symmetrical lung expansion. Lungs CTA Bilat w/o w/r/r  HEART:  There is a regular rate and rhythm. No murmur, rub, or gallop. T  ABDOMEN:  Soft and non-tender. Bowel sounds are normal.    SKIN:  There are no rashes, cyanosis, jaundice, or ecchymosis present. EXTREMITIES:   The extremities are unremarkable without clubbing, cyanosis, joint inflammation, degenerative, or ischemic change. MUSCULOSKELETAL:  There is no abnormal tone, muscle atrophy, or abnormal movement present. NEURO: Memory appears intact and mood is normal.  No gross sensorimotor deficits are present. Normal Gait  PSYCH: mood is normal      DIAGNOSTIC TESTS:      Home oxygen monitoring reviewed shows spending large majority greater than 9% time above 90 during sleep with very rare occasional desats into the mid 80s. ASSESSMENT AND PLAN  1. SUSANNE (obstructive sleep apnea)  Assessment & Plan:   Patient has mild obstructive sleep apnea with mild oxygen desaturations, has not done well with CPAP therapy over the years, unfortunately he did not try his new mask yet from last visit. We discussed pathophysiology of obstructive sleep apnea and technically treatment for him would be optional if his sleep quality and daytime function are at goal.  We did discuss he could have progressed slightly with weight gain although he is only gained 10 pounds since the last study. We went over options of oral appliance, surgery, ongoing CPAP versus no treatment and he would like to continue CPAP and give extra effort to using it every night for 1 month and we will make future treatment decisions at next visit.   Should he elect to undergo no treatment I would like him to repeat HST given his history of cardiopulmonary disease. His home oxygen monitor also suggest he is not having significant desaturations during sleep off CPAP, he brought his data in for me to review today. 2. Complete heart block (HCC)  Assessment & Plan:   We discussed that his heart block was likely not at all related to his untreated mild obstructive sleep apnea but that moderate to severe disease can lead to arrhythmias, will monitor  3. Primary hypertension  Assessment & Plan:  Patient's blood pressure is  in control today. Relationship with hypertension and untreated sleep apnea discussed. Blood pressure may improve with successful treatment of obstructive sleep apnea. 4. Non-seasonal allergic rhinitis, unspecified trigger  Assessment & Plan: This is much better controlled today with patent nares, he is currently on Singulair with Flonase and Claritin, the latter 2 being taken as needed. Maintaining upper airway patency is important if he is to be successful with nasal Pap therapy. He will go ahead and try the oronasal mask between now and next visit. 5. Obesity (BMI 30-39. 9)  Assessment & Plan:   Association with sleep disordered breathing and weight gain reviewed, hormonal imbalance from poor quality sleep reviewed as well. We discussed loss of significant weight could lead to significant improvement if not resolution of obstructive sleep apnea. A total of 60 minutes was spent today reviewing records prior to the patient appointment, time spent in patient appointment, ordering test and medications, and documenting in the patient record exclusive of procedures.        Greg Malave MD  Sleep Medicine/Internal Medicine/Pediatrics

## 2022-10-18 NOTE — ASSESSMENT & PLAN NOTE
Association with sleep disordered breathing and weight gain reviewed, hormonal imbalance from poor quality sleep reviewed as well. We discussed loss of significant weight could lead to significant improvement if not resolution of obstructive sleep apnea.

## 2022-10-18 NOTE — ASSESSMENT & PLAN NOTE
We discussed that his heart block was likely not at all related to his untreated mild obstructive sleep apnea but that moderate to severe disease can lead to arrhythmias, will monitor

## 2022-10-25 ENCOUNTER — CLINICAL DOCUMENTATION (OUTPATIENT)
Dept: PULMONOLOGY | Age: 67
End: 2022-10-25

## 2022-10-25 NOTE — PROGRESS NOTES
I have spoken with Dr Popeye Loco, post review of CT, EBUS added to procedure on 10/27. OR scheduling and Helga in endoscopy aware.

## 2022-10-26 RX ORDER — MEPERIDINE HYDROCHLORIDE 25 MG/ML
12.5 INJECTION INTRAMUSCULAR; INTRAVENOUS; SUBCUTANEOUS EVERY 5 MIN PRN
Status: CANCELLED | OUTPATIENT
Start: 2022-10-26

## 2022-10-26 RX ORDER — PROCHLORPERAZINE EDISYLATE 5 MG/ML
5 INJECTION INTRAMUSCULAR; INTRAVENOUS
Status: CANCELLED | OUTPATIENT
Start: 2022-10-26 | End: 2022-10-27

## 2022-10-26 RX ORDER — OXYCODONE HYDROCHLORIDE 5 MG/1
5 TABLET ORAL
Status: CANCELLED | OUTPATIENT
Start: 2022-10-26 | End: 2022-10-27

## 2022-10-26 RX ORDER — HYDROMORPHONE HYDROCHLORIDE 2 MG/ML
0.5 INJECTION, SOLUTION INTRAMUSCULAR; INTRAVENOUS; SUBCUTANEOUS EVERY 5 MIN PRN
Status: CANCELLED | OUTPATIENT
Start: 2022-10-26

## 2022-10-26 RX ORDER — ONDANSETRON 2 MG/ML
4 INJECTION INTRAMUSCULAR; INTRAVENOUS
Status: CANCELLED | OUTPATIENT
Start: 2022-10-26 | End: 2022-10-27

## 2022-10-27 ENCOUNTER — APPOINTMENT (OUTPATIENT)
Dept: GENERAL RADIOLOGY | Age: 67
End: 2022-10-27
Attending: INTERNAL MEDICINE
Payer: MEDICARE

## 2022-10-27 ENCOUNTER — HOSPITAL ENCOUNTER (OUTPATIENT)
Age: 67
Setting detail: OUTPATIENT SURGERY
Discharge: HOME | End: 2022-10-27
Attending: INTERNAL MEDICINE | Admitting: INTERNAL MEDICINE
Payer: MEDICARE

## 2022-10-27 VITALS
HEART RATE: 71 BPM | RESPIRATION RATE: 16 BRPM | OXYGEN SATURATION: 91 % | HEIGHT: 68 IN | WEIGHT: 200 LBS | BODY MASS INDEX: 30.31 KG/M2 | DIASTOLIC BLOOD PRESSURE: 63 MMHG | TEMPERATURE: 98 F | SYSTOLIC BLOOD PRESSURE: 134 MMHG

## 2022-10-27 DIAGNOSIS — R91.8 PULMONARY INFILTRATE: ICD-10-CM

## 2022-10-27 LAB
CRYPTOC AG SER QL LA: NEGATIVE
DIFFERENTIAL: NORMAL
EOSINOPHIL NFR BRONCH MANUAL: 1 %
LYMPHOCYTES NFR BRONCH MANUAL: 64 %
MACROPHAGES NFR BRONCH MANUAL: 29 %
NEUTROPHILS NFR BRONCH MANUAL: 6 %
OTHER CELLS NFR BLD MANUAL: 5 %

## 2022-10-27 PROCEDURE — 87206 SMEAR FLUORESCENT/ACID STAI: CPT

## 2022-10-27 PROCEDURE — 6360000002 HC RX W HCPCS: Performed by: INTERNAL MEDICINE

## 2022-10-27 PROCEDURE — 87106 FUNGI IDENTIFICATION YEAST: CPT

## 2022-10-27 PROCEDURE — 87385 HISTOPLASMA CAPSUL AG IA: CPT

## 2022-10-27 PROCEDURE — 7100000010 HC PHASE II RECOVERY - FIRST 15 MIN: Performed by: INTERNAL MEDICINE

## 2022-10-27 PROCEDURE — 87205 SMEAR GRAM STAIN: CPT

## 2022-10-27 PROCEDURE — 86635 COCCIDIOIDES ANTIBODY: CPT

## 2022-10-27 PROCEDURE — 2580000003 HC RX 258: Performed by: ANESTHESIOLOGY

## 2022-10-27 PROCEDURE — 87327 CRYPTOCOCCUS NEOFORM AG IA: CPT

## 2022-10-27 PROCEDURE — 86356 MONONUCLEAR CELL ANTIGEN: CPT

## 2022-10-27 PROCEDURE — 88312 SPECIAL STAINS GROUP 1: CPT

## 2022-10-27 PROCEDURE — 88112 CYTOPATH CELL ENHANCE TECH: CPT

## 2022-10-27 PROCEDURE — 3609011800 HC BRONCHOSCOPY/TRANSBRONCHIAL LUNG BIOPSY: Performed by: INTERNAL MEDICINE

## 2022-10-27 PROCEDURE — 31628 BRONCHOSCOPY/LUNG BX EACH: CPT | Performed by: INTERNAL MEDICINE

## 2022-10-27 PROCEDURE — 87102 FUNGUS ISOLATION CULTURE: CPT

## 2022-10-27 PROCEDURE — 86602 ANTINOMYCES ANTIBODY: CPT

## 2022-10-27 PROCEDURE — 88185 FLOWCYTOMETRY/TC ADD-ON: CPT

## 2022-10-27 PROCEDURE — 89051 BODY FLUID CELL COUNT: CPT

## 2022-10-27 PROCEDURE — 88184 FLOWCYTOMETRY/ TC 1 MARKER: CPT

## 2022-10-27 PROCEDURE — 99153 MOD SED SAME PHYS/QHP EA: CPT | Performed by: INTERNAL MEDICINE

## 2022-10-27 PROCEDURE — 88305 TISSUE EXAM BY PATHOLOGIST: CPT

## 2022-10-27 PROCEDURE — 87070 CULTURE OTHR SPECIMN AEROBIC: CPT

## 2022-10-27 PROCEDURE — 99152 MOD SED SAME PHYS/QHP 5/>YRS: CPT | Performed by: INTERNAL MEDICINE

## 2022-10-27 PROCEDURE — 7100000011 HC PHASE II RECOVERY - ADDTL 15 MIN: Performed by: INTERNAL MEDICINE

## 2022-10-27 PROCEDURE — 86612 BLASTOMYCES ANTIBODY: CPT

## 2022-10-27 PROCEDURE — 31624 DX BRONCHOSCOPE/LAVAGE: CPT | Performed by: INTERNAL MEDICINE

## 2022-10-27 PROCEDURE — 2709999900 HC NON-CHARGEABLE SUPPLY: Performed by: INTERNAL MEDICINE

## 2022-10-27 RX ORDER — SODIUM CHLORIDE 0.9 % (FLUSH) 0.9 %
5-40 SYRINGE (ML) INJECTION EVERY 12 HOURS SCHEDULED
Status: DISCONTINUED | OUTPATIENT
Start: 2022-10-27 | End: 2022-10-27 | Stop reason: HOSPADM

## 2022-10-27 RX ORDER — FENTANYL CITRATE 50 UG/ML
INJECTION, SOLUTION INTRAMUSCULAR; INTRAVENOUS PRN
Status: DISCONTINUED | OUTPATIENT
Start: 2022-10-27 | End: 2022-10-27 | Stop reason: ALTCHOICE

## 2022-10-27 RX ORDER — LIDOCAINE HYDROCHLORIDE 10 MG/ML
1 INJECTION, SOLUTION INFILTRATION; PERINEURAL
Status: DISCONTINUED | OUTPATIENT
Start: 2022-10-27 | End: 2022-10-27 | Stop reason: HOSPADM

## 2022-10-27 RX ORDER — SODIUM CHLORIDE 0.9 % (FLUSH) 0.9 %
5-40 SYRINGE (ML) INJECTION PRN
Status: DISCONTINUED | OUTPATIENT
Start: 2022-10-27 | End: 2022-10-27 | Stop reason: HOSPADM

## 2022-10-27 RX ORDER — MIDAZOLAM HYDROCHLORIDE 2 MG/2ML
INJECTION, SOLUTION INTRAMUSCULAR; INTRAVENOUS PRN
Status: DISCONTINUED | OUTPATIENT
Start: 2022-10-27 | End: 2022-10-27 | Stop reason: ALTCHOICE

## 2022-10-27 RX ORDER — ACETAMINOPHEN 500 MG
1000 TABLET ORAL ONCE
Status: DISCONTINUED | OUTPATIENT
Start: 2022-10-27 | End: 2022-10-27 | Stop reason: HOSPADM

## 2022-10-27 RX ORDER — SODIUM CHLORIDE, SODIUM LACTATE, POTASSIUM CHLORIDE, CALCIUM CHLORIDE 600; 310; 30; 20 MG/100ML; MG/100ML; MG/100ML; MG/100ML
INJECTION, SOLUTION INTRAVENOUS CONTINUOUS
Status: DISCONTINUED | OUTPATIENT
Start: 2022-10-27 | End: 2022-10-27 | Stop reason: HOSPADM

## 2022-10-27 RX ORDER — SODIUM CHLORIDE 9 MG/ML
INJECTION, SOLUTION INTRAVENOUS PRN
Status: DISCONTINUED | OUTPATIENT
Start: 2022-10-27 | End: 2022-10-27 | Stop reason: HOSPADM

## 2022-10-27 RX ORDER — MIDAZOLAM HYDROCHLORIDE 2 MG/2ML
2 INJECTION, SOLUTION INTRAMUSCULAR; INTRAVENOUS
Status: DISCONTINUED | OUTPATIENT
Start: 2022-10-27 | End: 2022-10-27 | Stop reason: HOSPADM

## 2022-10-27 RX ADMIN — SODIUM CHLORIDE, POTASSIUM CHLORIDE, SODIUM LACTATE AND CALCIUM CHLORIDE: 600; 310; 30; 20 INJECTION, SOLUTION INTRAVENOUS at 08:10

## 2022-10-27 ASSESSMENT — PAIN - FUNCTIONAL ASSESSMENT
PAIN_FUNCTIONAL_ASSESSMENT: NONE - DENIES PAIN
PAIN_FUNCTIONAL_ASSESSMENT: NONE - DENIES PAIN
PAIN_FUNCTIONAL_ASSESSMENT: WONG-BAKER FACES
PAIN_FUNCTIONAL_ASSESSMENT: NONE - DENIES PAIN
PAIN_FUNCTIONAL_ASSESSMENT: NONE - DENIES PAIN
PAIN_FUNCTIONAL_ASSESSMENT: WONG-BAKER FACES

## 2022-10-27 NOTE — H&P
Maribell Huber MD  Pulmonary Disease Pulmonary infiltrates +6 more  Dx Shortness of Breath; Referred by Brody Kramer MD  Reason for Visit     Progress Notes  Maribell Huber MD (Physician)   Pulmonary Disease  Expand All Collapse All     Patient Name:  Nellie Jefferson                             YOB: 1955  MRN: [id number]                                              Office Visit 9/13/2022     ASSESSMENT AND PLAN:  (Medical Decision Making)     Dexter Tolentino was seen today for shortness of breath. Diagnoses and all orders for this visit:     Pulmonary infiltrates  -     CT CHEST WO CONTRAST; Future  -Patient with infiltrates previously, but currently feeling better. Lung exam is unremarkable. See studies that were done below that are unremarkable. Given overall improvement in condition would not pursue this unless they seem to persist on follow-up CT. If it does may eventually need a biopsy. Can also do a another ESR, BNP as well as a CBC to rule for infection.  -Can try Mucinex 1200 mg twice a day. I also showed his wife to do chest PT to see if that would help. Moderate persistent asthma without complication  -Currently stable on Advair twice a day with rescue inhaler as needed would continue. Dyspnea on exertion  -Does have dyspnea with exertion seeing cardiology today for catheterization would recommend follow-up. Atherosclerosis of native coronary artery of native heart with angina pectoris ([facility] [address]  -See above  Gastroesophageal reflux disease, unspecified whether esophagitis present  -Does have severe reflux and is currently taking medications. We will send to gastroenterology may eventually need EGD as well. Non-seasonal allergic rhinitis, unspecified trigger  -Would benefit from antihistamines. Is on Singulair recommend that he try Allegra daily to see if that would help. SUSANNE (obstructive sleep apnea)  -In the past has been intolerant of PAP therapy.   I did give him Katelyn fullface mask to see if that would help him tolerate it. Not sure he can tolerate nasal mask 100% of the time, since he does have a deviated septum, but the Katelyn nasal was also another option. We will set him up in the sleep center for a visit, will need to get old records from [place] as well since he had the studies done there with Dr. Estelle Richardson. All questions answered for the patient today. Appreciative time spent. I did wish him well for his upcoming catheterization today. Encounter Medications    No orders of the defined types were placed in this encounter. No orders of the defined types were placed in this encounter. Follow-up and Dispositions    Return in about 6 months (around 3/13/2023) for With Anirudh in Pulmonary  and Follow up with Sleep in [facility] [place], [place] Prior to Appointment. Romy Wilkinson MD     Total time for encounter on day of encounter was 44 minutes. This time includes chart prep, review of tests/procedures, review of other provider's notes, documentation and counseling patient regarding disease process and medications. _________________________________________________________________________     HISTORY OF PRESENT ILLNESS:    Mr. Tereza Clark is a 61 y.o. male who is seen at [facility] today for  Shortness of Breath  Patient previously seen by Brigette Anthony NP. Patient had wheezing, coughing, sinus drainage multiple family members with asthma and had an abnormal CAT scan of the chest.  Patient was some exposure in the yardwork from chemicals and verbalizes but not significant amount. Also did body water. His CAT scan showed some mediastinal lymph nodes and granulomas with some groundglass changes. Work-up for sarcoid, histo, regular infiltrates was unremarkable. Please note extensive laboratory studies below. CD4 to CD8 ratio was 0.78, but ESR was normal and neutrophil predominant fluid by 96%.     Since patient has been on inhalers, overall feels much better, but not 100%. Does report wheezing is resolved. Still reports sinus drainage and also does report rather significant reflux despite medications. He did see Dr. Jovanny Ludwig on 0 who eventually did additional work-up including a stress test which showed abnormal inferior apical and apical fixed defect concerning for prior MI. As a result he was scheduled for cardiac catheterization today. REVIEW OF SYSTEMS: 10 point review of systems is negative except as reported in HPI. PHYSICAL EXAM: Body mass index is 30.42 kg/m². Vitals       Vitals:     09/13/22 0828   BP: (!) 140/80   Pulse: 61   Resp: 20   Temp: 98 °F (36.7 °C)   TempSrc: Temporal   SpO2: 96%   Weight: 203 lb (92.1 kg)   Height: 5' 8.5\" (1.74 m)             General:   Alert, cooperative, no distress, appears stated age. Eyes:   Conjunctivae/corneas clear. PERRL        Mouth/Throat:  Lips, mucosa, and tongue normal. Teeth and gums normal.,  Modified Cloud stage IV. Nasal septum deviated to the right with decreased air entry in the right nasal passage        Lungs:   Clear to auscultation     Heart:   Regular rate and rhythm, S1, S2 normal, no murmur, click, rub or gallop. Abdomen:    Soft, non-tender. Extremities:  Extremities normal, atraumatic, no cyanosis or edema. Skin:  Skin color normal. No rashes or lesions     Neurologic:  A&Ox3      DIAGNOSTIC TESTS:                                                                                    LABS:         Lab Results   Component Value Date/Time     WBC 5.3 09/08/2022 09:33 AM     HGB 15.1 09/08/2022 09:33 AM     HCT 44.3 09/08/2022 09:33 AM      09/08/2022 09:33 AM     TSH 0.957 05/01/2022 06:14 PM     ESR 11 05/02/2022 02:40 AM      BAL and LABs 7/26  Negative for  Blasto  Histoplasmosis  HIV. Respiratory culture. AFB culture. Atypical pneumonia. Aspergillus. Varicella. Viral culture. Fungal culture.   Cytology-negative for cancer cells. Lymphocyte ratio CD4 to CD8 was 0.78. Differential was neutrophils 96% lymphocytes 6%     Imaging: I performed an independent interpretation of the patient's images. CXR:   XR CHEST PORTABLE 05/02/2022     Narrative  CHEST X-RAY, 1 VIEW     INDICATION: Cardiac pacemaker placement     COMPARISON: 5/1/2022     TECHNIQUE: Single AP view of the chest was obtained. FINDINGS:     Lungs: Normal.     Cardiomediastinal silhouette: Dual-chamber cardiac pacer. Leads appear intact  and well positioned. Pleura: No pneumothorax or pleural effusion. Osseous structures: Normal.     Impression  No pneumothorax following dual-chamber cardiac pacemaker placement. CT Chest:   CT CHEST W CONTRAST 07/06/2022     Narrative  CT Chest with contrast 7/6/2022     Indication: Follow-up nodular opacity seen on recent head and neck CT     Comparison: 5/1/2022     Protocol:  Contiguous 5 mm axial images were obtained from the neck base through  the upper abdomen with intravenous contrast, 80 mL Isovue 370. Radiation dose  reduction techniques were used for this study:  Our CT scanners use one or all  of the following: Automated exposure control, adjustment of the mA and/or kVp  according to patient's size, iterative reconstruction. Findings:  Pulmonary parenchyma with few scattered calcified benign granulomata. In  addition, subcentimeter calcified mediastinal and hilar lymph nodes also present  consistent with sequelae of granulomatous process. Mild cardiac enlargement with  coronary artery calcification. Left chest pacer device. No pericardial effusion,  pleural effusion, or pneumothorax. No pathologic adenopathy. Few perihilar scattered subcentimeter nodules extending to the periphery with  more concentrated focus along the right minor fissure series 2 image 25 in the  right apex as noted on previous CT exam. Limited evaluation upper abdomen shows  extensive hepatic steatosis.  The adrenal glands are normal. No acute or  aggressive osseous lesion. Impression  1. Sequelae remote granulomatous process as outlined above evidenced by  calcified pulmonary nodules and mediastinal/hilar lymph node. 2. Predominant perihilar nodular foci also likely related to granulomatous  process although endobronchial infection not excluded. Pulmonology consultation  recommended. CT of chest without contrast:     7/6/22      Nuclear Medicine: No results found for this or any previous visit from the past 3650 days. PFTs:   Office Spirometry Results Latest Ref Rng & Units 7/21/2022   FVC L 3.66   FEV1 L 2.71   FEV1 %PRED-PRE % 81   FVC %PRED-PRE % 82   FEV1/FVC % 74      No results found for this or any previous visit. No results found for this or any previous visit. FeNO: No results found for this or any previous visit. FeNO and Likelihood of Eosinophilic Asthma   Unlikely Intermediate Likely   <25 ppb 25-50 ppb >50ppb   Exercise Oximetry:  Echo:   TRANSTHORACIC ECHOCARDIOGRAM (TTE) LIMITED (CONTRAST/BUBBLE/3D PRN) 05/02/2022     Narrative  This is a summary report. The complete report is available in the patient's medical record. If you cannot access the medical record, please contact the sending organization for a detailed fax or copy. Left Ventricle: Normal left ventricular systolic function. Left ventricle size is normal. Normal wall thickness. Normal wall motion. No pericardial effusion. [facility] Reference Info:                                                                                                                Exposure History:  Second Hand Smoke Exposure: No  Birds: No  Asbestos: Yes?   TB: No  Hot Tubs/Humidifier: No  Organic/Inorganic Dusts: Yes  Molds: No  Occupation/Hobbies: machine shops, welding  Past Medical History        Past Medical History:   Diagnosis Date    Asthma      Chest pain 3/29/2013    Edema 1/16/2017    Erectile dysfunction 1/16/2017    GERD (gastroesophageal reflux disease) 3/29/2013    Hypothyroidism 3/29/2013           Tobacco Use      Smoking status: Never      Smokeless tobacco: Never           Allergies   Allergen Reactions    Crestor [Rosuvastatin Calcium] Myalgia       Leg pain    Lipitor [Atorvastatin] Myalgia       Leg pain    Amlodipine Swelling    Zetia [Ezetimibe] Myalgia       Leg pain              Current Outpatient Medications   Medication Instructions    albuterol sulfate HFA (PROVENTIL;VENTOLIN;PROAIR) 108 (90 Base) MCG/ACT inhaler No dose, route, or frequency recorded. Alirocumab 150 mg, SubCUTAneous    allopurinol (ZYLOPRIM) 300 mg, Oral, DAILY    aspirin 81 mg, Oral, DAILY    Coenzyme Q10 10 MG CAPS Oral    colchicine (COLCRYS) 0.6 mg, Oral, PRN    fluticasone-salmeterol (ADVAIR DISKUS) 250-50 MCG/ACT AEPB diskus inhaler 1 puff, Inhalation, EVERY 12 HOURS    levothyroxine (SYNTHROID) 100 mcg, Oral, DAILY BEFORE BREAKFAST    montelukast (SINGULAIR) 10 mg, Oral, DAILY    nitroGLYCERIN (NITROSTAT) 0.4 mg, SubLINGual    pantoprazole (PROTONIX) 40 MG tablet TAKE ONE TABLET BY MOUTH ONE TIME DAILY    valsartan-hydroCHLOROthiazide (DIOVAN HCT) 160-12.5 MG per tablet 1 tablet, Oral, DAILY            Echo:3/2022       Left Ventricle: Left ventricle size is normal. Normal wall thickness. Normal wall motion. Normal left ventricular systolic function with a visually estimated EF of 60 - 65%. Normal diastolic function. Mitral Valve: Mild transvalvular regurgitation. Update History & Physical    The patient's History and Physical of October 27, 2022 was reviewed with the patient and I examined the patient. There was no change. The surgical site was confirmed by the patient and me. Plan: The risks, benefits, expected outcome, and alternative to the recommended procedure have been discussed with the patient. Patient understands and wants to proceed with the procedure.      Electronically signed by Lola Christianson MD on 10/27/2022 at 9:12 AM

## 2022-10-27 NOTE — OP NOTE
Operative Note      Patient: Britney Samuels  YOB: 1955  MRN: [id number]    Date of Procedure: 10/27/2022    Pre-Op Diagnosis: Pulmonary infiltrate [R91.8]    Post-Op Diagnosis: Same       Procedure(s):  BRONCHOSCOPY  BRONCHOSCOPY/TRANSBRONCHIAL LUNG BIOPSY    Surgeon(s):  Dimitri Rome MD    Assistant:   * No surgical staff found *    Anesthesia: IV Sedation    Estimated Blood Loss (mL): less than 50     Complications: Bleeding    Specimens:   ID Type Source Tests Collected by Time Destination   1 : RUL TBBX Tissue Lung CULTURE, FUNGUS, CULTURE, TISSUE, AFB CULTURE + SMEAR W/RFLX ID FROM CULTURE Dimitri Rome MD 10/27/2022 [id number]    A : TBBX RUL Tissue Lung SURGICAL PATHOLOGY, SMEAR FUNGUS, MISCELLANEOUS SENDOUT Vasu Estrada MD 10/27/2022 [id number]    B : BAL Body Fluid Lung CYTOLOGY, NON-GYN, BODY FLUID CELL COUNT, CULTURE, FUNGUS, CULTURE, BODY FLUID, MISCELLANEOUS SENDOUT, AFB CULTURE + SMEAR W/RFLX ID FROM CULTURE Dimitri Rome MD 10/27/2022 [id number]        Implants:  * No implants in log *      Drains: * No LDAs found *    Findings: TBBX and BAL performed from RUL and sent fort histology with special stains for organisms  AFB and fungal cultures. Blood for Balasto/cocci/histo/crypto- Urine for histo(repeat  Flow cytometry   CD4:8 repeat- previously less than 1 highly suggestive of HP   HP panel; sent  Flow cytometry    Detailed Description of Procedure:   PROCEDURE    Bronchoscopy with airway inspection/cleanout/BAL. TBBX/EBBX. INDICATION   ABNORMAL CHEST CT WITH PERSISTENT PULMONARY INFILTRATE    EQUIPMENT:  Olympus  Bronchhoscope. Radial Jaw 4 forceps    ANESTHESIA    Concious sedation with: Fentanyl 250 mcg IV; Versed 3mg IV; Lidocaine 200 mg to tracheo-bronchial tree and vocal cords.       AIRWAY INSPECTION    After obtaining informed consent, using a bite block, an Olympus  video bronchoscope was  introduced into the trachea through the vocal chords/ without

## 2022-10-27 NOTE — PRE SEDATION
Sedation Pre-Procedure Note    Patient Name: Diane Moon   YOB: 1955  Room/Bed: ENDO/PL  Medical Record Number: [id number]  Date: 10/27/2022   Time: 9:56 AM       Indication:  abnormal chest ct    Consent: I have discussed with the patient and/or the patient representative the indication, alternatives, and the possible risks and/or complications of the planned procedure and the anesthesia methods. The patient and/or patient representative appear to understand and agree to proceed. Vital Signs:   Vitals:    10/27/22 0953   BP: (!) 193/86   Pulse: 92   Resp: 18   Temp:    SpO2: 99%       Past Medical History:   has a past medical history of Asthma, Chest pain, Edema, Erectile dysfunction, GERD (gastroesophageal reflux disease), and Hypothyroidism. Past Surgical History:   has a past surgical history that includes Cardiac catheterization (03/29/2013); Pacemaker insertion; bronchoscopy (N/A, 07/25/2022); Cardiac procedure (N/A, 09/13/2022); and Upper gastrointestinal endoscopy (N/A, 10/11/2022). Medications:   Scheduled Meds:    acetaminophen  1,000 mg Oral Once    sodium chloride flush  5-40 mL IntraVENous 2 times per day     Continuous Infusions:    lactated ringers 100 mL/hr at 10/27/22 0810    sodium chloride       PRN Meds: lidocaine 1 % injection, sodium chloride flush, sodium chloride, midazolam, fentaNYL, midazolam PF  Home Meds:   Prior to Admission medications    Medication Sig Start Date End Date Taking? Authorizing Provider   fluticasone-salmeterol (ADVAIR DISKUS) 250-50 MCG/ACT AEPB diskus inhaler Inhale 1 puff into the lungs in the morning and 1 puff in the evening.  9/30/22   SHAY Andrews - CNP   Alirocumab 150 MG/ML SOAJ Inject 150 mg into the skin every 14 days 9/29/22   David De La Paz MD   valsartan-hydroCHLOROthiazide (DIOVAN HCT) 160-12.5 MG per tablet Take 1 tablet by mouth daily 8/26/22   David De La Paz MD   albuterol sulfate HFA (PROVENTIL;VENTOLIN;PROAIR) 108 (90 Base) MCG/ACT inhaler  6/17/22   Historical Provider, MD   allopurinol (ZYLOPRIM) 300 MG tablet Take 300 mg by mouth in the morning. Historical Provider, MD   colchicine (COLCRYS) 0.6 MG tablet Take 0.6 mg by mouth as needed    Historical Provider, MD   pantoprazole (PROTONIX) 40 MG tablet TAKE ONE TABLET BY MOUTH ONE TIME DAILY 7/7/22   Historical Provider, MD   montelukast (SINGULAIR) 10 MG tablet Take 1 tablet by mouth in the morning. 7/21/22   Claudia Calloway APRN - CNP   aspirin 81 MG EC tablet Take 81 mg by mouth daily 3/28/22   Ar Automatic Reconciliation   Coenzyme Q10 10 MG CAPS Take by mouth    Ar Automatic Reconciliation   levothyroxine (SYNTHROID) 100 MCG tablet Take 100 mcg by mouth every morning (before breakfast)    Ar Automatic Reconciliation   nitroGLYCERIN (NITROSTAT) 0.4 MG SL tablet Place 0.4 mg under the tongue 3/30/13   Ar Automatic Reconciliation     Coumadin Use Last 7 Days:  no  Antiplatelet drug therapy use last 7 days: no  Other anticoagulant use last 7 days: no  Additional Medication Information:  none      Pre-Sedation Documentation and Exam:   I have personally completed a history, physical exam & review of systems for this patient (see notes).     Mallampati Airway Assessment:  normal, dentition not prohibitive, normal neck range of motion, Mallampati Class III - (soft palate & base of uvula are visible)    Prior History of Anesthesia Complications:   none    ASA Classification:  Class 1 - A normal healthy patient    Sedation/ Anesthesia Plan:   intravenous sedation    Medications Planned:   midazolam (Versed) intravenously and fentanyl intravenously    Patient is an appropriate candidate for plan of sedation: yes    Electronically signed by Bill Pelletier MD on 10/27/2022 at 9:56 AM

## 2022-10-27 NOTE — DISCHARGE INSTRUCTIONS
RESPIRATORY CARE - BRONCHOSCOPY - DISCHARGE INSTRUCTIONS      You received a lot of numbing medication for your throat and nose, and you also received medication to make you sleepy during your procedure. Because of this and because the bronchoscopy may have irritated your airways, we ask that you follow these directions:    1. Do not eat or drink until  11am .   After that, you may have what you please. You may want to try some liquids first, because your throat may be a little sore. 2. Medication may cause drowsiness for several hours, therefore:  Do not drive or operate machinery for remainder of the day. No alcohol today  Do not make any important or legal decisions for 24 hours  Do not sign any legal documents for 24 hours    3. You may cough up more mucus than usual and you may see some blood, but this is expected and should subside by the following day. 4. If severe throat irritation, coughing, or bleeding continue, call your doctor. 5.         If you run a fever greater than 102, call [facility] at [phone]. 6.         Dr. Reinaldo Loco has asked that you:                A. Call the doctor's office for follow up appointment                     Discharge Medications:  Current Discharge Medication List        CONTINUE these medications which have NOT CHANGED    Details   fluticasone-salmeterol (ADVAIR DISKUS) 250-50 MCG/ACT AEPB diskus inhaler Inhale 1 puff into the lungs in the morning and 1 puff in the evening.   Qty: 3 each, Refills: 3      Alirocumab 150 MG/ML SOAJ Inject 150 mg into the skin every 14 days  Qty: 6 Adjustable Dose Pre-filled Pen Syringe, Refills: 3      valsartan-hydroCHLOROthiazide (DIOVAN HCT) 160-12.5 MG per tablet Take 1 tablet by mouth daily  Qty: 30 tablet, Refills: 3    Associated Diagnoses: Primary hypertension      albuterol sulfate HFA (PROVENTIL;VENTOLIN;PROAIR) 108 (90 Base) MCG/ACT inhaler       allopurinol (ZYLOPRIM) 300 MG tablet Take 300 mg by mouth in the morning. colchicine (COLCRYS) 0.6 MG tablet Take 0.6 mg by mouth as needed      pantoprazole (PROTONIX) 40 MG tablet TAKE ONE TABLET BY MOUTH ONE TIME DAILY      montelukast (SINGULAIR) 10 MG tablet Take 1 tablet by mouth in the morning.   Qty: 30 tablet, Refills: 3      aspirin 81 MG EC tablet Take 81 mg by mouth daily      Coenzyme Q10 10 MG CAPS Take by mouth      levothyroxine (SYNTHROID) 100 MCG tablet Take 100 mcg by mouth every morning (before breakfast)      nitroGLYCERIN (NITROSTAT) 0.4 MG SL tablet Place 0.4 mg under the tongue              Any additional instructions:  ***    Instructions given to Darral Showers and other family members  Instructions given by:  Avi Singh RN

## 2022-10-28 LAB
H CAPSUL AG SER IA-ACNC: <0.5 NG/ML
HISTOPLASMA AG: <0.5 NG/ML

## 2022-10-29 LAB
ACID FAST STN SPEC: NEGATIVE
C IMMITIS AB TITR SER CF: NEGATIVE {TITER}
MYCOBACTERIUM SPEC QL CULT: NORMAL
SPECIMEN PREPARATION: NORMAL
SPECIMEN SOURCE: NORMAL

## 2022-10-30 LAB
ACID FAST STN SPEC: NEGATIVE
B DERMAT AB TITR SER: NEGATIVE {TITER}
BACTERIA SPEC CULT: NORMAL
BACTERIA SPEC CULT: NORMAL
GRAM STN SPEC: NORMAL
MYCOBACTERIUM SPEC QL CULT: NORMAL
SERVICE CMNT-IMP: NORMAL
SERVICE CMNT-IMP: NORMAL
SPECIMEN PREPARATION: NORMAL
SPECIMEN SOURCE: NORMAL

## 2022-11-04 LAB
A FUMIGATUS1 AB SER QL ID: NEGATIVE
A PULLULANS AB SER QL: NEGATIVE
CD3 CELLS # SPEC: 55 %
CD3+CD4+ CELLS # BLD: 23 %
CD4/CD8 RATIO: 0.77 RATIO
CD8: 30 %
FLOW CYTOMETRY RESULTS: NORMAL
LACEYELLA SACCHARI AB SER QL: NEGATIVE
PIGEON SERUM AB QL ID: NEGATIVE
S RECTIVIRGULA IGG SER QL ID: NEGATIVE
SPECIMEN SOURCE: NORMAL
SPECIMEN SOURCE: NORMAL
T VULGARIS AB SER QL ID: NEGATIVE
TEST ORDERED: NORMAL

## 2022-11-07 LAB
FUNGAL CULT/SMEAR: NORMAL
FUNGUS (MYCOLOGY) CULTURE: POSITIVE
FUNGUS SMEAR: ABNORMAL
REFLEX TO ID: ABNORMAL
SPECIMEN PROCESSING: NORMAL
SPECIMEN SOURCE: ABNORMAL
SPECIMEN SOURCE: NORMAL

## 2022-11-10 LAB
FUNGUS IDENTIFICATION: NORMAL
SPECIMEN SOURCE: NORMAL

## 2022-11-21 ENCOUNTER — OFFICE VISIT (OUTPATIENT)
Dept: CARDIOLOGY CLINIC | Age: 67
End: 2022-11-21
Payer: MEDICARE

## 2022-11-21 VITALS
SYSTOLIC BLOOD PRESSURE: 139 MMHG | HEART RATE: 70 BPM | WEIGHT: 207 LBS | HEIGHT: 68 IN | DIASTOLIC BLOOD PRESSURE: 88 MMHG | BODY MASS INDEX: 31.37 KG/M2

## 2022-11-21 DIAGNOSIS — I10 PRIMARY HYPERTENSION: ICD-10-CM

## 2022-11-21 DIAGNOSIS — E78.5 DYSLIPIDEMIA: ICD-10-CM

## 2022-11-21 DIAGNOSIS — R91.8 PULMONARY INFILTRATES: ICD-10-CM

## 2022-11-21 DIAGNOSIS — Z95.0 PACEMAKER: Primary | ICD-10-CM

## 2022-11-21 DIAGNOSIS — I25.119 CORONARY ARTERY DISEASE INVOLVING NATIVE CORONARY ARTERY OF NATIVE HEART WITH ANGINA PECTORIS (HCC): ICD-10-CM

## 2022-11-21 PROCEDURE — G8484 FLU IMMUNIZE NO ADMIN: HCPCS | Performed by: INTERNAL MEDICINE

## 2022-11-21 PROCEDURE — 3017F COLORECTAL CA SCREEN DOC REV: CPT | Performed by: INTERNAL MEDICINE

## 2022-11-21 PROCEDURE — 3074F SYST BP LT 130 MM HG: CPT | Performed by: INTERNAL MEDICINE

## 2022-11-21 PROCEDURE — G8427 DOCREV CUR MEDS BY ELIG CLIN: HCPCS | Performed by: INTERNAL MEDICINE

## 2022-11-21 PROCEDURE — 1036F TOBACCO NON-USER: CPT | Performed by: INTERNAL MEDICINE

## 2022-11-21 PROCEDURE — 99214 OFFICE O/P EST MOD 30 MIN: CPT | Performed by: INTERNAL MEDICINE

## 2022-11-21 PROCEDURE — 1123F ACP DISCUSS/DSCN MKR DOCD: CPT | Performed by: INTERNAL MEDICINE

## 2022-11-21 PROCEDURE — 3078F DIAST BP <80 MM HG: CPT | Performed by: INTERNAL MEDICINE

## 2022-11-21 PROCEDURE — G8417 CALC BMI ABV UP PARAM F/U: HCPCS | Performed by: INTERNAL MEDICINE

## 2022-11-21 RX ORDER — NITROGLYCERIN 0.4 MG/1
0.4 TABLET SUBLINGUAL EVERY 5 MIN PRN
Qty: 25 TABLET | Refills: 3 | Status: SHIPPED | OUTPATIENT
Start: 2022-11-21

## 2022-11-21 ASSESSMENT — ENCOUNTER SYMPTOMS: SHORTNESS OF BREATH: 0

## 2022-11-21 NOTE — PROGRESS NOTES
[address] [place], [place]  [address] [place], [place] [address], [address]  PHONE: [phone]    Carlo Noguera  1955      SUBJECTIVE:   Carlo Noguera is a 74 y.o. male seen for a follow up visit regarding the following:     Chief Complaint   Patient presents with    Coronary Artery Disease       HPI:    Patient presents for follow-up. He was seen in September with exertional dyspnea and fatigue. Stress test was abnormal with evidence of likely prior infarction. Cardiac catheterization recommended and performed. This showed:      LHC (9/13/22): Patent RCA stent. Mild disease in other vessels. EF 55-60%. He has been evaluated by pulmonary for pulmonary infiltrates of uncertain etiology. Underwent bronchoscopy and transbronchial biopsy on October 27. He was told lavage was negative. Told biopsy was negative to date except possible fungal culture. Still feels short of breath with exertion. Example is walking up from [address] is steep. Has to stop and recovers quickly. No chest pain. Exercise:  Walking 2 miles on treadmill a few times a week. Also doing Yoga. BP controlled. No dizziness since decreasing Valsartan/HCTZ. Pacemaker:  Last pacemaker evaluation. Past Medical History, Past Surgical History, Family history, Social History, and Medications were all reviewed with the patient today and updated as necessary.            Current Outpatient Medications:     nitroGLYCERIN (NITROSTAT) 0.4 MG SL tablet, Place 1 tablet under the tongue every 5 minutes as needed for Chest pain, Disp: 25 tablet, Rfl: 3    fluticasone-salmeterol (ADVAIR DISKUS) 250-50 MCG/ACT AEPB diskus inhaler, Inhale 1 puff into the lungs in the morning and 1 puff in the evening., Disp: 3 each, Rfl: 3    Alirocumab 150 MG/ML SOAJ, Inject 150 mg into the skin every 14 days, Disp: 6 Adjustable Dose Pre-filled Pen Syringe, Rfl: 3    valsartan-hydroCHLOROthiazide (DIOVAN HCT) 160-12.5 MG per tablet, Take 1 tablet by mouth daily, Disp: 30 tablet, Rfl: 3    albuterol sulfate HFA (PROVENTIL;VENTOLIN;PROAIR) 108 (90 Base) MCG/ACT inhaler, , Disp: , Rfl:     allopurinol (ZYLOPRIM) 300 MG tablet, Take 300 mg by mouth in the morning., Disp: , Rfl:     colchicine (COLCRYS) 0.6 MG tablet, Take 0.6 mg by mouth as needed, Disp: , Rfl:     pantoprazole (PROTONIX) 40 MG tablet, TAKE ONE TABLET BY MOUTH ONE TIME DAILY, Disp: , Rfl:     montelukast (SINGULAIR) 10 MG tablet, Take 1 tablet by mouth in the morning., Disp: 30 tablet, Rfl: 3    aspirin 81 MG EC tablet, Take 81 mg by mouth daily, Disp: , Rfl:     Coenzyme Q10 10 MG CAPS, Take by mouth, Disp: , Rfl:     levothyroxine (SYNTHROID) 100 MCG tablet, Take 100 mcg by mouth every morning (before breakfast), Disp: , Rfl:      Allergies   Allergen Reactions    Crestor [Rosuvastatin Calcium] Myalgia     Leg pain    Lipitor [Atorvastatin] Myalgia     Leg pain    Amlodipine Swelling    Zetia [Ezetimibe] Myalgia     Leg pain          Patient Active Problem List    Diagnosis Date Noted    Obesity (BMI 30-39.9) 10/18/2022     Priority: Medium    Pulmonary infiltrates 09/13/2022     Priority: Medium    GERD (gastroesophageal reflux disease) 09/13/2022     Priority: Medium    SUSANNE (obstructive sleep apnea) 09/13/2022     Priority: Medium     2008 HST, AHI 14 SPO2 ankush 86%, CPAP titration done showed CPAP at 11 cm H2O  DME: [employer]      Cough in adult 10/04/2022     Priority: Low     Added automatically from request for surgery [id number]      Early satiety 10/04/2022     Priority: Low     Added automatically from request for surgery [id number]      Pacemaker 05/18/2022     Priority: Low     1.   Dual chamber pacemaker (BSC) placed due to heart block and syncope   (5/2/22)        Complete heart block ([address] 05/01/2022     Priority: Low    Amaurosis fugax of right eye 05/01/2022     Priority: Low    RBBB 03/28/2022     Priority: Low    Allergic rhinitis 01/16/2017     Priority: Low    Coronary artery disease involving native coronary artery of native heart with angina pectoris ([facility] [address] 01/16/2017     Priority: Low     1. Unstable angina (3/29/13):   a. LHC:  LMCA: Normal.  LAD: 20% prox. 20-30% mid. LCx: Normal.  OM3:   20% prox. RCA: 20-30% proximal.  95% distal.  RPL: 30% RPL. EF 65%. Normal wall motion. b.  PCI of distal RCA:   4 X 23 mm Xience MASON. 2. Cardiolite at [facility] 2017. Normal   3. Echo (3/31/2022): EF 60-65%. Normal diastolic function. Mild MR/TR. 4.  LHC (9/13/22): Patent RCA stent. Mild disease in other vessels. EF 55-60%. Dyslipidemia 03/29/2013     Priority: Low    Primary hypertension 03/29/2013     Priority: Low        Social History     Tobacco Use    Smoking status: Never    Smokeless tobacco: Never   Substance Use Topics    Alcohol use: Yes     Alcohol/week: 20.0 standard drinks     Types: 20 Drinks containing 0.5 oz of alcohol per week       ROS:    Review of Systems   Constitutional: Negative for malaise/fatigue. Cardiovascular:  Positive for dyspnea on exertion. Negative for chest pain. Respiratory:  Negative for shortness of breath. Musculoskeletal:  Positive for arthritis. Neurological:  Negative for focal weakness. Psychiatric/Behavioral:  Negative for depression. PHYSICAL EXAM:  Wt Readings from Last 3 Encounters:   11/21/22 207 lb (93.9 kg)   10/27/22 200 lb (90.7 kg)   10/18/22 207 lb 8 oz (94.1 kg)     BP Readings from Last 3 Encounters:   11/21/22 139/88   10/27/22 134/63   10/18/22 130/70     Pulse Readings from Last 3 Encounters:   11/21/22 70   10/27/22 71   10/18/22 67       Physical Exam  Constitutional:       General: He is not in acute distress. Neck:      Vascular: No carotid bruit. Cardiovascular:      Rate and Rhythm: Normal rate and regular rhythm. Pulmonary:      Effort: No respiratory distress. Breath sounds: Normal breath sounds.    Abdominal:      General: Bowel sounds are normal.      Palpations: Abdomen is soft. Musculoskeletal:         General: No swelling. Skin:     General: Skin is warm and dry. Neurological:      General: No focal deficit present. Psychiatric:         Mood and Affect: Mood normal.       Medical problems and test results were reviewed with the patient today. Lab Results   Component Value Date    WBC 5.3 09/08/2022    HGB 15.1 09/08/2022    HCT 44.3 09/08/2022    MCV 97.1 09/08/2022     09/08/2022       Lab Results   Component Value Date/Time     09/08/2022 09:33 AM    K 4.5 09/08/2022 09:33 AM     09/08/2022 09:33 AM    CO2 30 09/08/2022 09:33 AM    BUN 18 09/08/2022 09:33 AM    CREATININE 1.00 09/08/2022 09:33 AM    GLUCOSE 107 09/08/2022 09:33 AM    CALCIUM 9.6 09/08/2022 09:33 AM        Lab Results   Component Value Date    CHOL 129 05/02/2022     Lab Results   Component Value Date    TRIG 193 (H) 05/02/2022     Lab Results   Component Value Date    HDL 42 05/02/2022     Lab Results   Component Value Date    LDLCALC 48.4 05/02/2022     Lab Results   Component Value Date    LABVLDL 38.6 (H) 05/02/2022     Lab Results   Component Value Date    CHOLHDLRATIO 3.1 05/02/2022        Data from outside records/labs from outside providers have been reviewed and summarized as noted in the HPI, past history and data review sections of this note       ASSESSMENT and PLAN      1. Coronary artery disease involving native coronary artery of native heart without angina pectoris [facility])  Recent cardiac catheterization showed patent stent with mild residual disease in other segments. Continue aggressive risk factor modification aspirin therapy daily. 2. Pacemaker  Normal device function. Continue to follow with pacemaker interrogation. 3. Primary hypertension  Blood pressure controlled. Had orthostatic symptoms with higher dose valsartan/HCTZ in the past.  Continue home monitoring. Continue valsartan/HCTZ.     4. Pulmonary infiltrates  Await pulmonary determination of etiology of infiltrates    5. Dyslipidemia  Continue Praluent therapy. Return in about 6 months (around 5/21/2023). Edita Jj MD  11/21/2022  9:05 AM    This note may have been dictated using speech recognition software.   As a result, error of speech recognition may have occurred

## 2022-11-28 ENCOUNTER — TELEPHONE (OUTPATIENT)
Dept: CARDIOLOGY CLINIC | Age: 67
End: 2022-11-28

## 2022-12-02 ENCOUNTER — TELEPHONE (OUTPATIENT)
Dept: CARDIOLOGY CLINIC | Age: 67
End: 2022-12-02

## 2022-12-02 NOTE — TELEPHONE ENCOUNTER
Request Reference Number: [id number]. Santiago Najjar 140MG/ML is approved through 05/28/2023. Your patient may now fill this prescription and it will be covered.   Patient notified//brendab

## 2022-12-07 ENCOUNTER — TELEPHONE (OUTPATIENT)
Dept: PULMONOLOGY | Age: 67
End: 2022-12-07

## 2022-12-07 NOTE — TELEPHONE ENCOUNTER
Sue Shanks is calling for test results. TYPE OF TEST COMPLETED: PATHOLOGY/BIOPSY     WHEN WAS THIS TEST COMPLETED?  10/27/2022    WHERE WAS THIS TEST COMPLETED? [facility]    Please call patient/caregiver back at [phone].       Jamil Mckinney

## 2022-12-09 DIAGNOSIS — Z95.0 PRESENCE OF CARDIAC PACEMAKER: ICD-10-CM

## 2022-12-09 DIAGNOSIS — R55 SYNCOPE AND COLLAPSE: Primary | ICD-10-CM

## 2022-12-16 ENCOUNTER — OFFICE VISIT (OUTPATIENT)
Dept: SLEEP MEDICINE | Age: 67
End: 2022-12-16
Payer: MEDICARE

## 2022-12-16 VITALS
HEIGHT: 69 IN | TEMPERATURE: 97.8 F | SYSTOLIC BLOOD PRESSURE: 112 MMHG | OXYGEN SATURATION: 95 % | RESPIRATION RATE: 14 BRPM | BODY MASS INDEX: 31.1 KG/M2 | DIASTOLIC BLOOD PRESSURE: 68 MMHG | WEIGHT: 210 LBS | HEART RATE: 68 BPM

## 2022-12-16 DIAGNOSIS — R06.09 DYSPNEA ON EXERTION: ICD-10-CM

## 2022-12-16 DIAGNOSIS — J45.40 MODERATE PERSISTENT ASTHMA WITHOUT COMPLICATION: Primary | ICD-10-CM

## 2022-12-16 DIAGNOSIS — D86.9 SARCOIDOSIS: ICD-10-CM

## 2022-12-16 DIAGNOSIS — J45.40 MODERATE PERSISTENT ASTHMA WITHOUT COMPLICATION: ICD-10-CM

## 2022-12-16 DIAGNOSIS — D86.9 SARCOIDOSIS: Primary | ICD-10-CM

## 2022-12-16 DIAGNOSIS — G47.33 OSA (OBSTRUCTIVE SLEEP APNEA): ICD-10-CM

## 2022-12-16 LAB — ERYTHROCYTE [SEDIMENTATION RATE] IN BLOOD: 3 MM/HR

## 2022-12-16 PROCEDURE — 3074F SYST BP LT 130 MM HG: CPT | Performed by: INTERNAL MEDICINE

## 2022-12-16 PROCEDURE — 3017F COLORECTAL CA SCREEN DOC REV: CPT | Performed by: INTERNAL MEDICINE

## 2022-12-16 PROCEDURE — G8484 FLU IMMUNIZE NO ADMIN: HCPCS | Performed by: INTERNAL MEDICINE

## 2022-12-16 PROCEDURE — 99215 OFFICE O/P EST HI 40 MIN: CPT | Performed by: INTERNAL MEDICINE

## 2022-12-16 PROCEDURE — 1123F ACP DISCUSS/DSCN MKR DOCD: CPT | Performed by: INTERNAL MEDICINE

## 2022-12-16 PROCEDURE — 3078F DIAST BP <80 MM HG: CPT | Performed by: INTERNAL MEDICINE

## 2022-12-16 PROCEDURE — G8427 DOCREV CUR MEDS BY ELIG CLIN: HCPCS | Performed by: INTERNAL MEDICINE

## 2022-12-16 PROCEDURE — G8417 CALC BMI ABV UP PARAM F/U: HCPCS | Performed by: INTERNAL MEDICINE

## 2022-12-16 PROCEDURE — 1036F TOBACCO NON-USER: CPT | Performed by: INTERNAL MEDICINE

## 2022-12-16 ASSESSMENT — SLEEP AND FATIGUE QUESTIONNAIRES
HOW LIKELY ARE YOU TO NOD OFF OR FALL ASLEEP WHILE SITTING INACTIVE IN A PUBLIC PLACE: 0
HOW LIKELY ARE YOU TO NOD OFF OR FALL ASLEEP WHILE SITTING AND READING: 1
HOW LIKELY ARE YOU TO NOD OFF OR FALL ASLEEP WHILE SITTING AND TALKING TO SOMEONE: 0
HOW LIKELY ARE YOU TO NOD OFF OR FALL ASLEEP WHEN YOU ARE A PASSENGER IN A CAR FOR AN HOUR WITHOUT A BREAK: 1
HOW LIKELY ARE YOU TO NOD OFF OR FALL ASLEEP WHILE LYING DOWN TO REST IN THE AFTERNOON WHEN CIRCUMSTANCES PERMIT: 3
HOW LIKELY ARE YOU TO NOD OFF OR FALL ASLEEP WHILE WATCHING TV: 3
ESS TOTAL SCORE: 8
HOW LIKELY ARE YOU TO NOD OFF OR FALL ASLEEP IN A CAR, WHILE STOPPED FOR A FEW MINUTES IN TRAFFIC: 0
HOW LIKELY ARE YOU TO NOD OFF OR FALL ASLEEP WHILE SITTING QUIETLY AFTER LUNCH WITHOUT ALCOHOL: 0

## 2022-12-16 NOTE — PROGRESS NOTES
.pp    Patient Name:  Nohemi Gresham                             YOB: 1955  MRN: [id number]                                              Office Visit 12/16/2022    ASSESSMENT AND PLAN:  (Medical Decision Making)    Sola Stahl was seen today for shortness of breath. Diagnoses and all orders for this visit:    Pulmonary infiltrates  -now felt to be sarcoidosis  -will get baseline CPFTS now and then see if needs to start prednisone course of 40 daily x 2 weeks then 20 mg thereafter for 4 more weeks. Will need CT after that but all depends on CPFTS findings.   -did review CT chest with the patient and his wife today.   -get new ESR and CRP today. Moderate persistent asthma without complication  -Currently stable on Advair twice a day with rescue inhaler as needed would continue. Dyspnea on exertion  -Does have dyspnea with moderate exertion. Atherosclerosis of native coronary artery of native heart with angina pectoris ([facility] [address]  - seenBy cardiology    Gastroesophageal reflux disease, unspecified whether esophagitis present  -Does have severe reflux and is currently taking medications. Non-seasonal allergic rhinitis, unspecified trigger  -Would benefit from antihistamines. Is on Singulair recommend that he try Allegra daily to see if that would help. SUSANNE (obstructive sleep apnea)  -In the past has been intolerant of PAP therapy and did not do well with the mask. Did see Dr. Luke Cedeno and decided to hold off treatment now. Did do overnight at home and did well. Sleeps well and does not report any sleep disturbances    All questions answered. Time spent 50 minutes    No orders of the defined types were placed in this encounter. No orders of the defined types were placed in this encounter. Follow-up and Dispositions    Return in about 2 months (around 2/16/2023) for With Me, With CPFTS before.        Shruthi Sue MD    Total time for encounter on day of encounter was 40 minutes. This time includes chart prep, review of tests/procedures, review of other provider's notes, documentation and counseling patient regarding disease process and medications. _________________________________________________________________________    HISTORY OF PRESENT ILLNESS:    Mr. Jayde Tan is a 74 y.o. male who is seen at [facility] today for  Shortness of Breath (Results from multiple studies)  This pleasant gentleman comes in today for follow-up visit. Please note he does have asthma and is currently on medication for this. He did have an abnormal CAT scan in July of this year which had infiltrates mostly within the right chest but bilateral along with some hilar adenopathy with some calcifications. We did do a BAL and with work-up for possible histoplasmosis versus sarcoid versus etc.  That work-up was unremarkable. Did also have an abnormal CD4/CD8 ratio of 0.7 but the HP panel was negative. Patient then had another CAT scan done in October that showed persistent infiltrates at this time around had a another bronchoscopy but this time with transbronchial biopsies. 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 Did grow some Candida albicans from one of the fungal cultures but it was scant. Work-up for histo and other agents was all negative. Patient is coming in today and wants to know what our next step is. Currently reports that only has some dyspnea with moderate exertion. Does not have any wheezing, fever, cough, chills, night sweats or hemoptysis. He does take his asthma medication regularly and has not had a flareup. Patient also reports that he has stopped using CPAP altogether, since did not benefit from it and did see Dr. Ben Schaffer. During his evaluation with him overnight oximetry studies were normal and his weight is relatively unchanged.   Also reports having refreshed sleep and indicated was having too much difficulty utilizing PAP therapy and actually had worse sleep with it. REVIEW OF SYSTEMS: 10 point review of systems is negative except as reported in HPI. PHYSICAL EXAM: Body mass index is 31.47 kg/m². Vitals:    12/16/22 1458   BP: 112/68   Pulse: 68   Resp: 14   Temp: 97.8 °F (36.6 °C)   SpO2: 95%   Weight: 210 lb (95.3 kg)   Height: 5' 8.5\" (1.74 m)         General:   Alert, cooperative, no distress, appears stated age. Eyes:   Conjunctivae/corneas clear. PERRL        Mouth/Throat:  Lips, mucosa, and tongue normal. Teeth and gums normal.,  Modified Cloud stage IV. Nasal septum deviated to the right with decreased air entry in the right nasal passage        Lungs:   Clear to auscultation     Heart:   Regular rate and rhythm, S1, S2 normal, no murmur, click, rub or gallop. Abdomen:    Soft, non-tender. Extremities:  Extremities normal, atraumatic, no cyanosis or edema. Skin:  Skin color normal. No rashes or lesions     Neurologic:  A&Ox3     DIAGNOSTIC TESTS:                                                                                    LABS:   Lab Results   Component Value Date/Time    WBC 5.3 09/08/2022 09:33 AM    HGB 15.1 09/08/2022 09:33 AM    HCT 44.3 09/08/2022 09:33 AM     09/08/2022 09:33 AM    TSH 0.957 05/01/2022 06:14 PM    ESR 11 05/02/2022 02:40 AM     BAL and LABs 7/26  Negative for  Blasto  Histoplasmosis  HIV. Respiratory culture. AFB culture. Atypical pneumonia. Aspergillus. Varicella. Viral culture. Fungal culture. Cytology-negative for cancer cells. Lymphocyte ratio CD4 to CD8 was 0.78. Differential was neutrophils 96% lymphocytes 6%    BAL with transbronchial biopsies from October  I noted the results with biopsy showing non-caseating granuloma and only culture positive is BAL with C-albicans. Note C-Albicans is part of the normal emerald. The CD4 to CD4 ratio is again <1 but does have negative HP panel.    Extensive other studies negative    Imaging: I performed an independent interpretation of the patient's images. CXR:   XR CHEST PORTABLE 05/02/2022    Narrative  CHEST X-RAY, 1 VIEW    INDICATION: Cardiac pacemaker placement    COMPARISON: 5/1/2022    TECHNIQUE: Single AP view of the chest was obtained. FINDINGS:    Lungs: Normal.    Cardiomediastinal silhouette: Dual-chamber cardiac pacer. Leads appear intact  and well positioned. Pleura: No pneumothorax or pleural effusion. Osseous structures: Normal.    Impression  No pneumothorax following dual-chamber cardiac pacemaker placement. CT Chest:   CT ABDOMEN WO IV CONTRAST 10/07/2022    Narrative  EXAMINATION: CT ABDOMEN WO CONTRAST 10/7/2022 8:44 AM    ACCESSION NUMBER: [id number]    COMPARISON: None available    INDICATION: Unspecified abdominal pain; Abdominal distension (gaseous); Atrioventricular block, complete; Shortness of breath    TECHNIQUE: Contiguous axial computed tomographic images were obtained from the  domes of the diaphragm to iliac crest. Coronal reconstructions were also  performed. Radiation dose reduction techniques were used for this study. Our CT scanners  use one or all of the following: Automated exposure control, adjustment of the  mA and/or kV according to patient size, iterative reconstruction. FINDINGS:  LUNG BASES: No airspace consolidation within the lung bases. No sizable pleural  effusion. Partial visualization of pacemaker leads terminating within the right  atrium and ventricle. LIVER: The liver contour is normal. Diffuse hypoattenuation of the liver  parenchyma, compatible with hepatic steatosis. No focal liver lesion is evident,  however lack of IV contrast limits evaluation. BILIARY TREE: The gallbladder is within normal limits. No biliary dilation. SPLEEN: Normal.    PANCREAS: No pancreatic mass or ductal dilation. ADRENALS: Normal.    KIDNEYS: The kidneys are symmetric in size. No renal calculus or hydronephrosis. No renal mass.     BOWEL: The visualized colon and small bowel is normal in caliber. No evidence of  bowel obstruction. The visualized portions of the appendix is normal in  appearance. Small diverticula along the second and third portion of the  duodenum. PERITONEUM/RETROPERITONEUM: No ascites or free air. No retroperitoneal  lymphadenopathy. VESSELS: No abdominal aortic aneurysm. Scattered calcified atherosclerosis  within the abdominal aorta. ABDOMINAL WALL: No hernia or mass. BONES: No suspicious osseous lesion. Mild degenerative changes of the  thoracolumbar spine. Impression  1. No acute abnormality within the upper abdomen. 2.  Diffuse hepatic steatosis. CT of chest without contrast:     7/6/22      Nuclear Medicine: No results found for this or any previous visit from the past 3650 days. PFTs:   Office Spirometry Results Latest Ref Rng & Units 7/21/2022   FVC L 3.66   FEV1 L 2.71   FEV1 %PRED-PRE % 81   FVC %PRED-PRE % 82   FEV1/FVC % 74     No results found for this or any previous visit. No results found for this or any previous visit. FeNO: No results found for this or any previous visit. FeNO and Likelihood of Eosinophilic Asthma   Unlikely Intermediate Likely   <25 ppb 25-50 ppb >50ppb   Exercise Oximetry:  Echo:   TRANSTHORACIC ECHOCARDIOGRAM (TTE) LIMITED (CONTRAST/BUBBLE/3D PRN) 05/02/2022    Narrative  This is a summary report. The complete report is available in the patient's medical record. If you cannot access the medical record, please contact the sending organization for a detailed fax or copy. Left Ventricle: Normal left ventricular systolic function. Left ventricle size is normal. Normal wall thickness. Normal wall motion. No pericardial effusion. [facility] Reference Info:                                                                                                                Exposure History:  Second Hand Smoke Exposure: No  Birds: No  Asbestos: Yes?   TB: No  Hot Tubs/Humidifier: No  Organic/Inorganic Dusts: Yes  Molds: No  Occupation/Hobbies: machine shops, welding  Past Medical History:   Diagnosis Date    Asthma     inhaler daily and prn    Chest pain 3/29/2013    Edema 1/16/2017    Erectile dysfunction 1/16/2017    GERD (gastroesophageal reflux disease) 3/29/2013    Hypothyroidism 3/29/2013        Tobacco Use      Smoking status: Never      Smokeless tobacco: Never    Allergies   Allergen Reactions    Crestor [Rosuvastatin Calcium] Myalgia     Leg pain    Lipitor [Atorvastatin] Myalgia     Leg pain    Amlodipine Swelling    Zetia [Ezetimibe] Myalgia     Leg pain       Current Outpatient Medications   Medication Instructions    albuterol sulfate HFA (PROVENTIL;VENTOLIN;PROAIR) 108 (90 Base) MCG/ACT inhaler No dose, route, or frequency recorded. Alirocumab 150 mg, SubCUTAneous, EVERY 14 DAYS    allopurinol (ZYLOPRIM) 300 mg, Oral, DAILY    aspirin 81 mg, Oral, DAILY    Coenzyme Q10 10 MG CAPS Oral    colchicine (COLCRYS) 0.6 mg, Oral, PRN    fluticasone-salmeterol (ADVAIR DISKUS) 250-50 MCG/ACT AEPB diskus inhaler 1 puff, Inhalation, EVERY 12 HOURS    levothyroxine (SYNTHROID) 100 mcg, Oral, DAILY BEFORE BREAKFAST    montelukast (SINGULAIR) 10 mg, Oral, DAILY    nitroGLYCERIN (NITROSTAT) 0.4 mg, SubLINGual, EVERY 5 MIN PRN    pantoprazole (PROTONIX) 40 MG tablet TAKE ONE TABLET BY MOUTH ONE TIME DAILY    valsartan-hydroCHLOROthiazide (DIOVAN HCT) 160-12.5 MG per tablet 1 tablet, Oral, DAILY          Echo:3/2022       Left Ventricle: Left ventricle size is normal. Normal wall thickness. Normal wall motion. Normal left ventricular systolic function with a visually estimated EF of 60 - 65%. Normal diastolic function. Mitral Valve: Mild transvalvular regurgitation.

## 2022-12-17 LAB — CRP SERPL-MCNC: <0.3 MG/DL (ref 0–0.9)

## 2022-12-19 ENCOUNTER — HOSPITAL ENCOUNTER (OUTPATIENT)
Dept: PULMONOLOGY | Age: 67
Discharge: HOME | End: 2022-12-22
Payer: MEDICARE

## 2022-12-19 DIAGNOSIS — J45.40 MODERATE PERSISTENT ASTHMA WITHOUT COMPLICATION: ICD-10-CM

## 2022-12-19 PROCEDURE — 94726 PLETHYSMOGRAPHY LUNG VOLUMES: CPT

## 2022-12-19 PROCEDURE — 94729 DIFFUSING CAPACITY: CPT

## 2022-12-19 PROCEDURE — 94060 EVALUATION OF WHEEZING: CPT

## 2022-12-21 ENCOUNTER — TELEPHONE (OUTPATIENT)
Dept: PULMONOLOGY | Age: 67
End: 2022-12-21

## 2022-12-21 NOTE — TELEPHONE ENCOUNTER
----- Message from Corrinne Dome, MD sent at 12/18/2022  4:14 PM EST -----  The labs are negative for the ESR, CRP which show inflammation. Bret can you please let the patient know. Tell him that is good.    Luke Gunn MD

## 2022-12-22 NOTE — RESULT ENCOUNTER NOTE
Complete PFTs were entirely normal.  I spoke to the patient given the fact that the PFTs are normal, the inflammatory markers are normal I do not recommend starting any steroids for possible sarcoid. I told him we will do a another CAT scan in 6 months from now and we will do complete PFTs as well in 6 months. He is in agreement with this plan. Bret can you please order both these for 6 months. The CAT scan is without contrast.  Thank you. I called the patient and let him know the results. I also told him that if he is feeling worse then he should contact us and we will see him sooner. He is aware and understands all the above.   Please make an appointment after he has both of the studies    Austen Brown MD

## 2022-12-28 ENCOUNTER — TELEPHONE (OUTPATIENT)
Dept: PULMONOLOGY | Age: 67
End: 2022-12-28

## 2022-12-28 DIAGNOSIS — R91.8 PULMONARY INFILTRATES: Primary | ICD-10-CM

## 2022-12-28 NOTE — TELEPHONE ENCOUNTER
Order for CT to be done in 6 months has been established. Schedulers:  Can we call the patient and see if we can get them scheduled for CPFT's done in 6 months?   Thank you so much! // Elzbieta Sheets

## 2022-12-28 NOTE — TELEPHONE ENCOUNTER
----- Message from Ranjana Flores MD sent at 12/22/2022  3:53 PM EST -----  Complete PFTs were entirely normal.  I spoke to the patient given the fact that the PFTs are normal, the inflammatory markers are normal I do not recommend starting any steroids for possible sarcoid. I told him we will do a another CAT scan in 6 months from now and we will do complete PFTs as well in 6 months. He is in agreement with this plan. Bret can you please order both these for 6 months. The CAT scan is without contrast.  Thank you. I called the patient and let him know the results. I also told him that if he is feeling worse then he should contact us and we will see him sooner. He is aware and understands all the above.   Please make an appointment after he has both of the studies    Vee Anand MD

## 2023-03-28 ENCOUNTER — HOSPITAL ENCOUNTER (OUTPATIENT)
Dept: MAMMOGRAPHY | Age: 68
Discharge: HOME | End: 2023-03-31
Payer: MEDICARE

## 2023-03-28 DIAGNOSIS — N63.10 MASS OF RIGHT BREAST, UNSPECIFIED QUADRANT: ICD-10-CM

## 2023-03-28 DIAGNOSIS — N63.41 SUBAREOLAR MASS OF RIGHT BREAST: ICD-10-CM

## 2023-03-28 PROCEDURE — 77066 DX MAMMO INCL CAD BI: CPT

## 2023-03-28 PROCEDURE — 76642 ULTRASOUND BREAST LIMITED: CPT

## 2023-04-20 DIAGNOSIS — Z95.0 PRESENCE OF CARDIAC PACEMAKER: ICD-10-CM

## 2023-04-20 DIAGNOSIS — R55 SYNCOPE AND COLLAPSE: ICD-10-CM

## 2023-05-01 NOTE — DISCHARGE INSTRUCTIONS
Called and spoke to pt. Relayed message from PCP, pt verbalzied understanding. Pt states she had been taking the 25 mcg, but picked up the new rx for 50mcg on Friday and has started to take them. Pt scheduled for repeat labs.    Pt also asked to send PCP a message regarding her compression stockings. States the person she spoke to told her to bring $100 to the appointment and pt states she is on a fixed income and doesn't have that kind of money. Asking if there is another location she could go to that would be more affordable for her.    HEART CATHETERIZATION/ANGIOGRAPHY DISCHARGE INSTRUCTIONS    Check puncture site frequently for swelling or bleeding. If there is any bleeding, apply pressure over the area with a clean towel or washcloth and call 911. Notify your doctor for any redness, swelling, drainage, or oozing from the puncture site. Notify your doctor for any fever or chills. If the extremity becomes cold, numb, or painful call Dr. Rickie Patton at [phone]. Activity should be limited for the next 48 hours. No heavy lifting, pushing, pulling  or strenuous activity for 48 hours. No heavy lifting (anything over 10 pounds) for 3 days. You may resume your usual diet. Drink more fluids than usual.  Have a responsible person drive you home and stay with you for at least 24 hours after your heart catheterization/angiography. You may remove bandage from your right wrist in 24 hours. You may shower in 24 hours. No tub baths, hot tubs, or swimming for 1 week. Do not place any lotions, creams, powders, or ointments over puncture site for 1 week. You may place a clean band-aid over the puncture site each day for 5 days. Change daily. Sedation for a Medical Procedure: Care Instructions     You were given a sedative medication during your visit. While many of the effects will have worn   off before you leave; you may continue to feel some effects for several hours. Common side effects from sedation include:  Feeling sleepy. (Your doctors and nurses will make sure you are not too sleepy to go home.)  Nausea and vomiting. This usually does not last long. Feeling tired. How can you care for yourself at home? Activity    Don't do anything for 24 hours that requires attention to detail. It takes time for the medicine effects to completely wear off. Do not make important legal decisions for 24 hours. Do not sign any legal documents for 24 hours.      Do not drink alcohol today     For your safety, you should not drive or operate heavy machinery for the remainder of the day     Rest when you feel tired. Getting enough sleep will help you recover. Diet    You can eat your normal diet, unless your doctor gives you other instructions. If your stomach is upset, try clear liquids and bland, low-fat foods like plain toast or rice. Drink plenty of fluids (unless your doctor tells you not to). Don't drink alcohol for 24 hours. Medicines    Be safe with medicines. Read and follow all instructions on the label. If the doctor gave you a prescription medicine for pain, take it as prescribed. If you are not taking a prescription pain medicine, ask your doctor if you can take an over-the-counter medicine. If you think your pain medicine is making you sick to your stomach: Take your medicine after meals (unless your doctor has told you not to). Ask your doctor for a different pain medicine. I have read the above instructions and have had the opportunity to ask questions.       Patient: ________________________   Date: _____________    Witness: _______________________   Date: _____________

## 2023-05-02 ENCOUNTER — TELEPHONE (OUTPATIENT)
Dept: CARDIOLOGY CLINIC | Age: 68
End: 2023-05-02

## 2023-05-02 NOTE — TELEPHONE ENCOUNTER
PA for Repatha submitted//ofelia    This medication or product was previously approved on [id number] from 2022-11-28 to 2023-12-31.

## 2023-05-24 RX ORDER — ALIROCUMAB 150 MG/ML
INJECTION, SOLUTION SUBCUTANEOUS
Qty: 6 ML | Refills: 3 | Status: SHIPPED | OUTPATIENT
Start: 2023-05-24

## 2023-05-25 ENCOUNTER — OFFICE VISIT (OUTPATIENT)
Age: 68
End: 2023-05-25
Payer: MEDICARE

## 2023-05-25 VITALS
SYSTOLIC BLOOD PRESSURE: 138 MMHG | WEIGHT: 207 LBS | BODY MASS INDEX: 30.66 KG/M2 | HEART RATE: 60 BPM | HEIGHT: 69 IN | DIASTOLIC BLOOD PRESSURE: 86 MMHG

## 2023-05-25 DIAGNOSIS — I10 PRIMARY HYPERTENSION: ICD-10-CM

## 2023-05-25 DIAGNOSIS — I25.119 CORONARY ARTERY DISEASE INVOLVING NATIVE CORONARY ARTERY OF NATIVE HEART WITH ANGINA PECTORIS (HCC): Primary | ICD-10-CM

## 2023-05-25 DIAGNOSIS — Z95.0 PACEMAKER: ICD-10-CM

## 2023-05-25 DIAGNOSIS — E78.5 DYSLIPIDEMIA: ICD-10-CM

## 2023-05-25 PROBLEM — I44.2 COMPLETE HEART BLOCK (HCC): Status: RESOLVED | Noted: 2022-05-01 | Resolved: 2023-05-25

## 2023-05-25 PROBLEM — I45.10 RBBB: Status: RESOLVED | Noted: 2022-03-28 | Resolved: 2023-05-25

## 2023-05-25 PROCEDURE — G8417 CALC BMI ABV UP PARAM F/U: HCPCS | Performed by: INTERNAL MEDICINE

## 2023-05-25 PROCEDURE — 99214 OFFICE O/P EST MOD 30 MIN: CPT | Performed by: INTERNAL MEDICINE

## 2023-05-25 PROCEDURE — 3017F COLORECTAL CA SCREEN DOC REV: CPT | Performed by: INTERNAL MEDICINE

## 2023-05-25 PROCEDURE — 3075F SYST BP GE 130 - 139MM HG: CPT | Performed by: INTERNAL MEDICINE

## 2023-05-25 PROCEDURE — 1036F TOBACCO NON-USER: CPT | Performed by: INTERNAL MEDICINE

## 2023-05-25 PROCEDURE — 3079F DIAST BP 80-89 MM HG: CPT | Performed by: INTERNAL MEDICINE

## 2023-05-25 PROCEDURE — G8427 DOCREV CUR MEDS BY ELIG CLIN: HCPCS | Performed by: INTERNAL MEDICINE

## 2023-05-25 PROCEDURE — 1123F ACP DISCUSS/DSCN MKR DOCD: CPT | Performed by: INTERNAL MEDICINE

## 2023-05-25 ASSESSMENT — ENCOUNTER SYMPTOMS: SHORTNESS OF BREATH: 0

## 2023-05-25 NOTE — PROGRESS NOTES
[address] [place], [place]  [address], [address] [place] [address], [address] [place] [address]  PHONE: [phone]    Daniela Villavicencio  1955      SUBJECTIVE:   Daniela Villavicencio is a 74 y.o. male seen for a follow up visit regarding the following:     Chief Complaint   Patient presents with    Coronary Artery Disease       HPI:    Daniela Villavicencio presents for routine follow up for known Coronary Artery Disease. Multiple issues addressed as outlined below:     Coronary Artery Disease:  Patient denies any recent angina. Notes compliance with medical therapy. No recent NTG use. No intolerance to anti-platelet therapy. Hypertension:  Ambulatory BP readings have been controlled. Patient reports compliance with medical therapy without side effects. Pacemaker:   Pacemaker interrogation in March showed normal function. 15% atrially paced and 99% ventricular paced. Hyperlipidemia:   Recent lipid panel done in April shows total cholesterol 121, LDL 41, HDL 43 and triglyceride 187. Tolerating Praluent. Dyspnea: Improved. Felt pulmonary sarcoid by pulmonary. Off prednisone. Plans for repeat CT scan. Past Medical History, Past Surgical History, Family history, Social History, and Medications were all reviewed with the patient today and updated as necessary.            Current Outpatient Medications:     PRALUENT 150 MG/ML SOAJ, INJECT 150MG SUBCUTANEOUSLY EVERY 2 WEEKS, Disp: 6 mL, Rfl: 3    nitroGLYCERIN (NITROSTAT) 0.4 MG SL tablet, Place 1 tablet under the tongue every 5 minutes as needed for Chest pain, Disp: 25 tablet, Rfl: 3    fluticasone-salmeterol (ADVAIR DISKUS) 250-50 MCG/ACT AEPB diskus inhaler, Inhale 1 puff into the lungs in the morning and 1 puff in the evening., Disp: 3 each, Rfl: 3    valsartan-hydroCHLOROthiazide (DIOVAN HCT) 160-12.5 MG per tablet, Take 1 tablet by mouth daily, Disp: 30 tablet, Rfl: 3    albuterol sulfate HFA (PROVENTIL;VENTOLIN;PROAIR) 108 (90 Base) MCG/ACT

## 2023-06-28 ENCOUNTER — HOSPITAL ENCOUNTER (OUTPATIENT)
Dept: CT IMAGING | Age: 68
Discharge: HOME | End: 2023-07-01
Payer: MEDICARE

## 2023-06-28 ENCOUNTER — TELEPHONE (OUTPATIENT)
Dept: PULMONOLOGY | Age: 68
End: 2023-06-28

## 2023-06-28 DIAGNOSIS — R91.8 PULMONARY INFILTRATES: ICD-10-CM

## 2023-06-28 PROCEDURE — 71250 CT THORAX DX C-: CPT

## 2023-06-28 NOTE — TELEPHONE ENCOUNTER
Patient is needing someone  call him about the results from CT scan he said it was done this morning and it has changed since last CT and has gotten larger

## 2023-06-29 ENCOUNTER — TELEPHONE (OUTPATIENT)
Dept: PULMONOLOGY | Age: 68
End: 2023-06-29

## 2023-06-29 DIAGNOSIS — R91.8 PULMONARY INFILTRATES: Primary | ICD-10-CM

## 2023-06-29 DIAGNOSIS — D86.9 SARCOIDOSIS: Primary | ICD-10-CM

## 2023-06-29 DIAGNOSIS — D86.9 SARCOIDOSIS: ICD-10-CM

## 2023-06-29 RX ORDER — PREDNISONE 20 MG/1
20 TABLET ORAL DAILY
Qty: 42 TABLET | Refills: 0 | Status: SHIPPED | OUTPATIENT
Start: 2023-07-14 | End: 2023-08-25

## 2023-06-29 RX ORDER — PREDNISONE 20 MG/1
40 TABLET ORAL DAILY
Qty: 28 TABLET | Refills: 0 | Status: SHIPPED | OUTPATIENT
Start: 2023-06-29 | End: 2023-07-13

## 2023-06-29 NOTE — TELEPHONE ENCOUNTER
Bettie LOPEZ sent patient a [employer] message and sent Dr. Gorge mendez serve message to review ct and advise of next steps since he is the one that ordered the CT.

## 2023-06-30 ENCOUNTER — TELEPHONE (OUTPATIENT)
Dept: PULMONOLOGY | Age: 68
End: 2023-06-30

## 2023-06-30 DIAGNOSIS — D86.9 SARCOIDOSIS: ICD-10-CM

## 2023-06-30 LAB
CRP SERPL-MCNC: 0.4 MG/DL (ref 0–0.9)
ERYTHROCYTE [SEDIMENTATION RATE] IN BLOOD: 13 MM/HR

## 2023-07-03 ENCOUNTER — TELEPHONE (OUTPATIENT)
Dept: PULMONOLOGY | Age: 68
End: 2023-07-03

## 2023-07-03 NOTE — TELEPHONE ENCOUNTER
I have returned call to patient. He is asking for an appointment with either Dr Tara English or Dr Vale Pacheco post repeat CT chest.  He is offered 8/31 at 350 to see Dr Vale Pacheco and accepts. Has started Prednisone per Dr Tara English and wants to assure he has MD follow up after this image.

## 2023-07-03 NOTE — RESULT ENCOUNTER NOTE
Please let the inflammatory markers are within normal limits. Bret can you please let the patient know. Tell him still be be using steroids given his history of sarcoid.   Bairon Contreras MD

## 2023-07-05 ENCOUNTER — TELEPHONE (OUTPATIENT)
Dept: PULMONOLOGY | Age: 68
End: 2023-07-05

## 2023-07-05 PROCEDURE — 93296 REM INTERROG EVL PM/IDS: CPT | Performed by: INTERNAL MEDICINE

## 2023-07-05 PROCEDURE — 93294 REM INTERROG EVL PM/LDLS PM: CPT | Performed by: INTERNAL MEDICINE

## 2023-07-05 NOTE — TELEPHONE ENCOUNTER
Martina Jensen MD  You 2 days ago     NA  Ok to push back appoint till see Dr. Darling Bliss in August. Thanks    Notified patient and 7/25/2023 appointment cancelled with approval of Dr Ronald Mahmood.

## 2023-07-05 NOTE — TELEPHONE ENCOUNTER
----- Message from Esme Valenzuela MD sent at 7/3/2023  4:32 PM EDT -----  Please let the inflammatory markers are within normal limits. Bret can you please let the patient know. Tell him still be be using steroids given his history of sarcoid.   Denilson Chavez MD

## 2023-07-05 NOTE — TELEPHONE ENCOUNTER
Spoke with the patient in regards to their Lab results, explained per Dr. Merary Gomes that the Inflammatory Markers are within normal limits and to continue using his steroids given his history of Sarcoid. Patient understood the results and did not have any further questions or concerns at this time. // Nydia Poag. MARVA.

## 2023-07-28 ENCOUNTER — NURSE ONLY (OUTPATIENT)
Age: 68
End: 2023-07-28

## 2023-07-28 DIAGNOSIS — I44.1 MOBITZ II: Primary | ICD-10-CM

## 2023-08-25 ENCOUNTER — HOSPITAL ENCOUNTER (OUTPATIENT)
Dept: CT IMAGING | Age: 68
End: 2023-08-25
Attending: INTERNAL MEDICINE
Payer: MEDICARE

## 2023-08-25 DIAGNOSIS — R91.8 PULMONARY INFILTRATES: ICD-10-CM

## 2023-08-25 DIAGNOSIS — D86.9 SARCOIDOSIS: ICD-10-CM

## 2023-08-25 PROCEDURE — 71250 CT THORAX DX C-: CPT

## 2023-08-30 ENCOUNTER — TELEPHONE (OUTPATIENT)
Dept: PULMONOLOGY | Age: 68
End: 2023-08-30

## 2023-08-30 NOTE — TELEPHONE ENCOUNTER
----- Message from Trish Ponce MD sent at 8/30/2023  1:22 AM EDT -----  The results reported the sarcoid is getting better - but minimal at best.   Bret can you please let the patient know and that Dr. Josselyn Villareal is working night shift and wants to look at the pictures with radiology and that will be by next week. Not this week.  Thanks  Cindy Davis MD

## 2023-08-30 NOTE — TELEPHONE ENCOUNTER
Spoke with the patient in regards to their CT scan results, explained per Dr. Geraldo Grove that the Sarcoid is getting better but minimal at best.  I also explained per Dr. Geraldo Grove that he is working night shift and wants to look at the scan with the radiologist and that will be next week and not this week. Patient understood the results and did not have any further questions or concerns at this time. // Jonny OCONNELL

## 2023-08-30 NOTE — RESULT ENCOUNTER NOTE
The results reported the sarcoid is getting better - but minimal at best.   Bret can you please let the patient know and that Dr. Dyana Cabrera is working night shift and wants to look at the pictures with radiology and that will be by next week. Not this week.  Thanks  Steffanie Roman MD

## 2023-08-31 ENCOUNTER — OFFICE VISIT (OUTPATIENT)
Dept: PULMONOLOGY | Age: 68
End: 2023-08-31
Payer: MEDICARE

## 2023-08-31 VITALS
RESPIRATION RATE: 18 BRPM | HEART RATE: 71 BPM | WEIGHT: 209 LBS | HEIGHT: 68 IN | OXYGEN SATURATION: 95 % | BODY MASS INDEX: 31.67 KG/M2 | SYSTOLIC BLOOD PRESSURE: 124 MMHG | TEMPERATURE: 99.1 F | DIASTOLIC BLOOD PRESSURE: 82 MMHG

## 2023-08-31 DIAGNOSIS — R91.8 PULMONARY INFILTRATES: Primary | ICD-10-CM

## 2023-08-31 DIAGNOSIS — D86.9 SARCOIDOSIS: ICD-10-CM

## 2023-08-31 PROCEDURE — G8417 CALC BMI ABV UP PARAM F/U: HCPCS | Performed by: INTERNAL MEDICINE

## 2023-08-31 PROCEDURE — G8427 DOCREV CUR MEDS BY ELIG CLIN: HCPCS | Performed by: INTERNAL MEDICINE

## 2023-08-31 PROCEDURE — 1123F ACP DISCUSS/DSCN MKR DOCD: CPT | Performed by: INTERNAL MEDICINE

## 2023-08-31 PROCEDURE — 99215 OFFICE O/P EST HI 40 MIN: CPT | Performed by: INTERNAL MEDICINE

## 2023-08-31 PROCEDURE — 3017F COLORECTAL CA SCREEN DOC REV: CPT | Performed by: INTERNAL MEDICINE

## 2023-08-31 PROCEDURE — 3079F DIAST BP 80-89 MM HG: CPT | Performed by: INTERNAL MEDICINE

## 2023-08-31 PROCEDURE — 3074F SYST BP LT 130 MM HG: CPT | Performed by: INTERNAL MEDICINE

## 2023-08-31 PROCEDURE — 1036F TOBACCO NON-USER: CPT | Performed by: INTERNAL MEDICINE

## 2023-08-31 NOTE — PROGRESS NOTES
RUL       Clinical History       Macroscopic Description        Zander Badillo, received in formalin labeled TBBX RUL and specimen   consists of multiple pale tan tissue fragments measuring in aggregate 0.8   cm in greatest dimension. The specimen is submitted entirely in one   cassette between foam pads.     sms/10/28/2022   Hypersensitivity pnuemonitis profile  Order: [id number]  Status: Final result     Visible to patient: Yes (seen)     Next appt: 11/21/2023 at 08:30 AM in Cardiology ([facility])     0 Result Notes       Component Ref Range & Units 10/27/22 1035   A fumigatus #1 Negative   Negative    M faeni Negative   Negative    T Vulgaris #1 Negative   Negative    A.  Pullulans Abs Negative   Negative    T sacchari Negative   Negative    [place] Serum Negative   Negative    Comment: (NOTE)   Performed At: [address] [id number]   Carola Castellanos MD FD:[id number]           Differential, BAL Fluid  Order: [id number]  Status: Final result     Visible to patient: Yes (seen)     Next appt: 11/21/2023 at 08:30 AM in Cardiology ([facility])     0 Result Notes        Component Ref Range & Units 10/27/22 1100 7/25/22 1315   Differential               Segmented Neutrophils, BAL % 6  94    Lymphocytes, BAL % 64  6    Macrophages, BAL % 29  0    Eosinophils BAL % 1  0    Other cells, fluid   5          Component Ref Range & Units 10/27/22 1035   Coccidioides Ab CF Neg:<1:2   Negative      Component Ref Range & Units 10/27/22 1035   Crypto Ag NEG   Negative      Component Ref Range & Units 10/27/22 1035 10/27/22 1035 7/21/22 0933   Histoplasma Ag <0.5 ng/mL ng/mL <0.5  <0.5 CM  <0.5 CM      Component Ref Range & Units 10/27/22 1035 7/21/22 0933   Blastomyces Dermatitis Antibody Neg:<1:1   Negative  Negative CM      Component Ref Range & Units 10/27/22 0950 7/25/22 1315   Source   BRONCHIAL LAVAGE [1]  BRONCHIAL LAVAGE [1]    CD3 % 55 Arup  85 Arup    CD4 % 23

## 2023-09-07 ENCOUNTER — TELEPHONE (OUTPATIENT)
Dept: PULMONOLOGY | Age: 68
End: 2023-09-07

## 2023-09-07 NOTE — TELEPHONE ENCOUNTER
----- Message from Maru Kevin MD sent at 9/6/2023  5:38 PM EDT -----  Tell him I looked at the pictures with radiology and only slightly better on the left side -- minimal at best. Overall means things are stable if not slightly better. Maru Kevin MD    ----- Message -----  From: Maru Kevin MD  Sent: 8/30/2023   1:22 AM EDT  To: Maru Kevin MD, Iván Starks MA    The results reported the sarcoid is getting better - but minimal at best.   Bret can you please let the patient know and that Dr. Karen Sanches is working night shift and wants to look at the pictures with radiology and that will be by next week. Not this week.  Thanks  Senthil Novoa MD

## 2023-09-07 NOTE — TELEPHONE ENCOUNTER
Spoke with the patient in regards to their CT scan results, explained per Dr. Vale Greene that he looked at the images with radiology and the only it is only slightly better on the left side, minimal at best but overall it means that things are stable if not slightly better. Patient understood the results and stated that he reviewed the report and had some questions. He stated that the report identified an ill-defined soft tissue approximately 2.6 cm x 1.6 cm and wanted to know if this was something new or if it was a current nodule and if it was a current nodule then was it better or worse. Dr. Vale Greene, can you please advise? Thank you so much!  // Killian OCONNELL Normal for race

## 2023-09-08 NOTE — TELEPHONE ENCOUNTER
Spoke with the patient and notified him per Dr. Nolan Liu that the nodule is essentially the same size and has been for awhile which is a good sign and has not grown. Patient understood the results and did not have any further questions or concerns at this time.  // Jorden Smoke

## 2023-10-31 PROCEDURE — 93296 REM INTERROG EVL PM/IDS: CPT | Performed by: INTERNAL MEDICINE

## 2023-10-31 PROCEDURE — 93294 REM INTERROG EVL PM/LDLS PM: CPT | Performed by: INTERNAL MEDICINE

## 2023-11-22 ENCOUNTER — HOSPITAL ENCOUNTER (OUTPATIENT)
Dept: CT IMAGING | Age: 68
Discharge: HOME | End: 2023-11-25
Attending: INTERNAL MEDICINE
Payer: MEDICARE

## 2023-11-22 DIAGNOSIS — R91.8 PULMONARY INFILTRATES: ICD-10-CM

## 2023-11-22 DIAGNOSIS — D86.9 SARCOIDOSIS: ICD-10-CM

## 2023-11-22 PROCEDURE — 71250 CT THORAX DX C-: CPT

## 2023-11-27 ENCOUNTER — OFFICE VISIT (OUTPATIENT)
Age: 68
End: 2023-11-27
Payer: MEDICARE

## 2023-11-27 VITALS
HEIGHT: 69 IN | HEART RATE: 60 BPM | WEIGHT: 207.8 LBS | BODY MASS INDEX: 30.78 KG/M2 | SYSTOLIC BLOOD PRESSURE: 142 MMHG | DIASTOLIC BLOOD PRESSURE: 82 MMHG

## 2023-11-27 DIAGNOSIS — I10 PRIMARY HYPERTENSION: ICD-10-CM

## 2023-11-27 DIAGNOSIS — Z95.0 PACEMAKER: ICD-10-CM

## 2023-11-27 DIAGNOSIS — E78.5 DYSLIPIDEMIA: ICD-10-CM

## 2023-11-27 DIAGNOSIS — D86.9 SARCOIDOSIS: ICD-10-CM

## 2023-11-27 DIAGNOSIS — I25.10 CORONARY ARTERY DISEASE INVOLVING NATIVE CORONARY ARTERY OF NATIVE HEART WITHOUT ANGINA PECTORIS: Primary | ICD-10-CM

## 2023-11-27 PROCEDURE — 3017F COLORECTAL CA SCREEN DOC REV: CPT | Performed by: INTERNAL MEDICINE

## 2023-11-27 PROCEDURE — 93000 ELECTROCARDIOGRAM COMPLETE: CPT | Performed by: INTERNAL MEDICINE

## 2023-11-27 PROCEDURE — 1036F TOBACCO NON-USER: CPT | Performed by: INTERNAL MEDICINE

## 2023-11-27 PROCEDURE — G8427 DOCREV CUR MEDS BY ELIG CLIN: HCPCS | Performed by: INTERNAL MEDICINE

## 2023-11-27 PROCEDURE — 3079F DIAST BP 80-89 MM HG: CPT | Performed by: INTERNAL MEDICINE

## 2023-11-27 PROCEDURE — 3077F SYST BP >= 140 MM HG: CPT | Performed by: INTERNAL MEDICINE

## 2023-11-27 PROCEDURE — G8417 CALC BMI ABV UP PARAM F/U: HCPCS | Performed by: INTERNAL MEDICINE

## 2023-11-27 PROCEDURE — G8484 FLU IMMUNIZE NO ADMIN: HCPCS | Performed by: INTERNAL MEDICINE

## 2023-11-27 PROCEDURE — 1123F ACP DISCUSS/DSCN MKR DOCD: CPT | Performed by: INTERNAL MEDICINE

## 2023-11-27 PROCEDURE — 99214 OFFICE O/P EST MOD 30 MIN: CPT | Performed by: INTERNAL MEDICINE

## 2023-11-27 RX ORDER — BUPROPION HYDROCHLORIDE 300 MG/1
300 TABLET ORAL EVERY MORNING
COMMUNITY

## 2023-11-27 ASSESSMENT — ENCOUNTER SYMPTOMS: SHORTNESS OF BREATH: 0

## 2023-11-27 NOTE — PROGRESS NOTES
[address] [id number] [address]  PHONE: [phone]    Nemo Orellana  1955      SUBJECTIVE:   Nemo Orellana is a 79 y.o. male seen for a follow up visit regarding the following:     Chief Complaint   Patient presents with    Coronary Artery Disease       HPI:    Nemo Orellana presents for routine follow up. Multiple issues addressed. Coronary Artery Disease:  Patient denies any recent angina. Notes compliance with medical therapy. No recent NTG use. No intolerance to anti-platelet therapy. Hypertension: BP mildly elevated in office. Has not been checking regularly at home. Patient reports compliance with medical therapy without side effects. Hyperlipidemia:   Taking Praluent. Cholesterol panel  followed by PCP and patient reports lipids under acceptable control. Tolerating current statin dose without side effects. .    Pacemaker recently checked on October 31. Shows normal device function. Atrially paced 21% of the time and ventricular paced 99% at time. No atrial fibrillation. Recent echocardiogram showed no evidence of pacemaker induced cardiomyopathy. Echo (11/21/2023): EF 60%. Normal diastolic function. Mild MR/TR    Pulmonary Sarcoid:   Dyspnea much improved. Recent CT scan. IMPRESSION:  1.  Stable findings of the chest which would all be consistent with previously reported sarcoid. This includes irregular parenchymal density in the posterior segment of the right upper lobe which may represent fibrotic changes. Exercise:  Started back walking 3-4 miles. Past Medical History, Past Surgical History, Family history, Social History, and Medications were all reviewed with the patient today and updated as necessary.            Current Outpatient Medications:     buPROPion (WELLBUTRIN XL) 300 MG extended release tablet, Take 1 tablet by mouth every morning, Disp: , Rfl:     Krill Oil 300 MG CAPS, Take by

## 2023-12-05 ENCOUNTER — NURSE ONLY (OUTPATIENT)
Dept: PULMONOLOGY | Age: 68
End: 2023-12-05
Payer: MEDICARE

## 2023-12-05 DIAGNOSIS — D86.9 SARCOIDOSIS: Primary | ICD-10-CM

## 2023-12-05 LAB
FEF25-27, POC: 3.83 L/S
FET, POC: NORMAL
FEV 1 , POC: 3.14 L
FEV1/FVC, POC: NORMAL
FVC, POC: NORMAL
LUNG AGE, POC: NORMAL
PEF, POC: 7.23 L/S

## 2023-12-05 PROCEDURE — 94729 DIFFUSING CAPACITY: CPT | Performed by: INTERNAL MEDICINE

## 2023-12-05 PROCEDURE — 94010 BREATHING CAPACITY TEST: CPT | Performed by: INTERNAL MEDICINE

## 2023-12-05 PROCEDURE — 94726 PLETHYSMOGRAPHY LUNG VOLUMES: CPT | Performed by: INTERNAL MEDICINE

## 2023-12-05 NOTE — PROGRESS NOTES
Patient Name:  Bailey Sevilla                             YOB: 1955  MRN: [id number]                                              Office Visit 12/5/2023  HISTORY OF PRESENT ILLNESS:    Mr. Charmaine Zapata is a 74 y.o. male who is seen at [facility] today for  No chief complaint on file. LOV: Dr. Bard Sage  09/13/2022  Patient previously seen by Tao Jimenez NP. Patient had wheezing, coughing, sinus drainage multiple family members with asthma and had an abnormal CAT scan of the chest.  Patient was some exposure in the yardwork from chemicals and verbalizes but not significant amount. Also did body water. His CAT scan showed some mediastinal lymph nodes and granulomas with some groundglass changes. Work-up for sarcoid, histo, regular infiltrates was unremarkable. Please note extensive laboratory studies below. CD4 to CD8 ratio was 0.78, but ESR was normal and neutrophil predominant fluid by 96%. Since patient has been on inhalers, overall feels much better, but not 100%. Does report wheezing is resolved. Still reports sinus drainage and also does report rather significant reflux despite medications. He did see Dr. Marybeth Mcmillan on 0 who eventually did additional work-up including a stress test which showed abnormal inferior apical and apical fixed defect concerning for prior MI. As a result he was scheduled for cardiac catheterization today. ASSESSMENT AND PLAN:  (Medical Decision Making)      Bernabe was seen today for shortness of breath. Diagnoses and all orders for this visit:    Pulmonary infiltrates  -     CT CHEST WO CONTRAST; Future  -Patient with infiltrates previously, but currently feeling better. Lung exam is unremarkable. See studies that were done below that are unremarkable. Given overall improvement in condition would not pursue this unless they seem to persist on follow-up CT. If it does may eventually need a biopsy.   Can also do a another ESR, BNP

## 2023-12-06 ENCOUNTER — OFFICE VISIT (OUTPATIENT)
Dept: PULMONOLOGY | Age: 68
End: 2023-12-06
Payer: MEDICARE

## 2023-12-06 VITALS
BODY MASS INDEX: 31.52 KG/M2 | DIASTOLIC BLOOD PRESSURE: 88 MMHG | WEIGHT: 208 LBS | TEMPERATURE: 97.3 F | HEIGHT: 68 IN | HEART RATE: 75 BPM | RESPIRATION RATE: 15 BRPM | SYSTOLIC BLOOD PRESSURE: 142 MMHG | OXYGEN SATURATION: 97 %

## 2023-12-06 DIAGNOSIS — R06.09 DYSPNEA ON EXERTION: ICD-10-CM

## 2023-12-06 DIAGNOSIS — D86.9 SARCOIDOSIS: Primary | ICD-10-CM

## 2023-12-06 DIAGNOSIS — R93.89 ABNORMAL CT OF THE CHEST: ICD-10-CM

## 2023-12-06 DIAGNOSIS — J30.89 ENVIRONMENTAL AND SEASONAL ALLERGIES: ICD-10-CM

## 2023-12-06 DIAGNOSIS — K21.9 GASTROESOPHAGEAL REFLUX DISEASE, UNSPECIFIED WHETHER ESOPHAGITIS PRESENT: ICD-10-CM

## 2023-12-06 DIAGNOSIS — R91.8 PULMONARY INFILTRATES: ICD-10-CM

## 2023-12-06 DIAGNOSIS — G47.33 OSA (OBSTRUCTIVE SLEEP APNEA): ICD-10-CM

## 2023-12-06 DIAGNOSIS — J45.40 MODERATE PERSISTENT ASTHMA WITHOUT COMPLICATION: ICD-10-CM

## 2023-12-06 PROCEDURE — 3079F DIAST BP 80-89 MM HG: CPT | Performed by: INTERNAL MEDICINE

## 2023-12-06 PROCEDURE — 99213 OFFICE O/P EST LOW 20 MIN: CPT | Performed by: INTERNAL MEDICINE

## 2023-12-06 PROCEDURE — 1123F ACP DISCUSS/DSCN MKR DOCD: CPT | Performed by: INTERNAL MEDICINE

## 2023-12-06 PROCEDURE — G8417 CALC BMI ABV UP PARAM F/U: HCPCS | Performed by: INTERNAL MEDICINE

## 2023-12-06 PROCEDURE — G8484 FLU IMMUNIZE NO ADMIN: HCPCS | Performed by: INTERNAL MEDICINE

## 2023-12-06 PROCEDURE — 3017F COLORECTAL CA SCREEN DOC REV: CPT | Performed by: INTERNAL MEDICINE

## 2023-12-06 PROCEDURE — 3077F SYST BP >= 140 MM HG: CPT | Performed by: INTERNAL MEDICINE

## 2023-12-06 PROCEDURE — 1036F TOBACCO NON-USER: CPT | Performed by: INTERNAL MEDICINE

## 2023-12-06 PROCEDURE — G8427 DOCREV CUR MEDS BY ELIG CLIN: HCPCS | Performed by: INTERNAL MEDICINE

## 2024-01-02 ENCOUNTER — OFFICE VISIT (OUTPATIENT)
Age: 69
End: 2024-01-02
Payer: MEDICARE

## 2024-01-02 ENCOUNTER — TELEPHONE (OUTPATIENT)
Age: 69
End: 2024-01-02

## 2024-01-02 VITALS
DIASTOLIC BLOOD PRESSURE: 86 MMHG | HEIGHT: 68 IN | BODY MASS INDEX: 31.63 KG/M2 | SYSTOLIC BLOOD PRESSURE: 155 MMHG | HEART RATE: 60 BPM

## 2024-01-02 DIAGNOSIS — Z95.0 CARDIAC PACEMAKER IN SITU: ICD-10-CM

## 2024-01-02 DIAGNOSIS — R07.89 ATYPICAL CHEST PAIN: ICD-10-CM

## 2024-01-02 DIAGNOSIS — E78.5 HYPERLIPIDEMIA, UNSPECIFIED HYPERLIPIDEMIA TYPE: ICD-10-CM

## 2024-01-02 DIAGNOSIS — I10 HYPERTENSION, UNSPECIFIED TYPE: ICD-10-CM

## 2024-01-02 DIAGNOSIS — I25.10 CAD IN NATIVE ARTERY: Primary | ICD-10-CM

## 2024-01-02 PROBLEM — I34.0 MILD MITRAL REGURGITATION BY PRIOR ECHOCARDIOGRAM: Status: ACTIVE | Noted: 2024-01-02

## 2024-01-02 PROCEDURE — 99214 OFFICE O/P EST MOD 30 MIN: CPT | Performed by: INTERNAL MEDICINE

## 2024-01-02 PROCEDURE — G8484 FLU IMMUNIZE NO ADMIN: HCPCS | Performed by: INTERNAL MEDICINE

## 2024-01-02 PROCEDURE — G8417 CALC BMI ABV UP PARAM F/U: HCPCS | Performed by: INTERNAL MEDICINE

## 2024-01-02 PROCEDURE — 1036F TOBACCO NON-USER: CPT | Performed by: INTERNAL MEDICINE

## 2024-01-02 PROCEDURE — G8427 DOCREV CUR MEDS BY ELIG CLIN: HCPCS | Performed by: INTERNAL MEDICINE

## 2024-01-02 PROCEDURE — 3077F SYST BP >= 140 MM HG: CPT | Performed by: INTERNAL MEDICINE

## 2024-01-02 PROCEDURE — 3017F COLORECTAL CA SCREEN DOC REV: CPT | Performed by: INTERNAL MEDICINE

## 2024-01-02 PROCEDURE — 3079F DIAST BP 80-89 MM HG: CPT | Performed by: INTERNAL MEDICINE

## 2024-01-02 PROCEDURE — 1123F ACP DISCUSS/DSCN MKR DOCD: CPT | Performed by: INTERNAL MEDICINE

## 2024-01-02 NOTE — PROGRESS NOTES
[facility] [place] CARDIOLOGY Follow Up                 Reason for Visit: Chest pain    Subjective:     Patient is a 68 y.o. male with a PMH of CAD status post PCI, hyperlipidemia, hypertension, status post PPM and sarcoidosis who presents for follow-up.  The patient was last seen in November 2023 by his primary cardiologist, Dr. Gan.  The patient had a TTE in November 2023 that was noted to demonstrate a normal EF and mild MR.  The patient had an appointment with Dr. Pelayo at 9:30 AM but somehow his encounter became a nurse visit in [place] at 10 AM.  He presents as a work-in for a history of chest pain to this writer.  He has follow-up with his primary cardiologist in May 2024.  The patient had an LHC in September 2022 that noted a patent RCA stent and nonobstructive CAD elsewhere.  He says that he felt chest pain one week ago.  He describes it as a \"pressure\" sensation that did not occur with exertion.  The patient does not report any alleviating or aggravating factors for the chest pain when it occurred.    Past Medical History:   Diagnosis Date    Asthma     inhaler daily and prn    Chest pain 3/29/2013    Edema 1/16/2017    Erectile dysfunction 1/16/2017    GERD (gastroesophageal reflux disease) 3/29/2013    Hypothyroidism 3/29/2013      Past Surgical History:   Procedure Laterality Date    BRONCHOSCOPY N/A 07/25/2022    BRONCHOSCOPY/BAL performed by Jacky Oleary MD at [facility]    BRONCHOSCOPY N/A 10/27/2022    BRONCHOSCOPY/TRANSBRONCHIAL LUNG BIOPSY performed by Vasu Pryor MD at [facility] END[facility]    CARDIAC CATHETERIZATION  03/29/2013    x 1 stent    CARDIAC PROCEDURE N/A 09/13/2022    Left heart cath / coronary angiography performed by Geoffrey Gan MD at [facility] CARDIAC CATH LAB    PACEMAKER INSERTION      UPPER GASTROINTESTINAL ENDOSCOPY N/A 10/11/2022    EGD/ BIOPSY performed by Reyes Jennings MD at [facility]      No family history on file.   Social History     Tobacco Use    Smoking

## 2024-01-02 NOTE — TELEPHONE ENCOUNTER
Pt c/o intermittent chest pressure for several days. States started checking BP yesterday, BP was 160s/90s. States took a 2nd valsartan yesterday afternoon, which brought BP down to 140s/80s. States his BP today after being up and walking around, before taking am meds was again 160s/90s. States had chest pressure much of the day yesterday. Appt scheduled today at 9:30 with Dr. Pelayo. Verb understanding.

## 2024-01-16 ENCOUNTER — TELEPHONE (OUTPATIENT)
Age: 69
End: 2024-01-16

## 2024-01-16 NOTE — TELEPHONE ENCOUNTER
Patient called stating that he had decided to stop drinking the first of the year. Before New Years, he had his last \"hooray\" and probably drank a little too much. Patient states that the chest pressure has not bothered him since January 3. B/p and heart rate have been good. Patient states that he has one stent. Would like to know if he should proceed with stress test on Monday.     Patient informed that I will forward message to Dr. Gan, call back with doctors response. Patient voiced understanding and thanked me//ofelia  ----- Message -----  From: Bernabe Cole  Sent: 1/16/2024   2:47 PM EST  To: hospitals Cardiology Clinical Staff  Subject: Upcoming Stress Test on 1/22/24                  Dr. Gan    I was experiencing a high oro rate for a few days the beginning of this month. I saw Dr. Gracia on 1/2/24, he did not find any issues and scheduled a stress test for 1/22/24.  He also said to take my blood pressure daily for tracking purposes. Listed below are the numbers from the last 2 weeks.  As you can see my BP is back to normal ranges and i have had no issues after 1/4/24.   I did stop drinking alcohol completely on 1/1/24. My primary physician said the elevated BP could have been from that  ( I was drinking approximately 6 ounces of bourbon each evening prior to stopping cold turkey)       Please look at BP numbers below and let me know if you feel, I still need to have the stress test on 1/22/24. I will leave that up to your judgement.     Thank you   Bernabe Cole    1/3    145/90  P63  1/4    124/83 P83  1/5    113/72 P76  1/6    127/89 P92  1/8    125/77 P76  1/10  129/79  P71  1/11  139/81 P72  1/12  127/86  P73  1/14  126/82  P74  1/15  125/84  P78  1/16  131/82  P77

## 2024-01-17 NOTE — TELEPHONE ENCOUNTER
Patient called with Dr. Gan's response. Patient voiced understanding and confirmed appointment//brendab  Would proceed with stress test as ordered

## 2024-01-23 ENCOUNTER — TELEPHONE (OUTPATIENT)
Age: 69
End: 2024-01-23

## 2024-01-23 NOTE — TELEPHONE ENCOUNTER
----- Message from Cholo Gracia MD sent at 1/23/2024 12:06 PM EST -----  Please let the patient know the stress test was negative for myocardial ischemia.  The patient is to see Dr. Gan soon; therefore, I forwarded him and his PCP the results of this incidental finding of increased radiotracer uptake in the left axillary lymph node.

## 2024-01-23 NOTE — TELEPHONE ENCOUNTER
Advised patient of stress test results and Dr. Gracia's response. Advised patient to call PCP with any questions or concerns about increased radiotracer uptake in left axillary lymph note. Patient verbalized understanding.

## 2024-02-02 ENCOUNTER — OFFICE VISIT (OUTPATIENT)
Age: 69
End: 2024-02-02
Payer: MEDICARE

## 2024-02-02 VITALS
DIASTOLIC BLOOD PRESSURE: 80 MMHG | BODY MASS INDEX: 31.37 KG/M2 | WEIGHT: 207 LBS | HEIGHT: 68 IN | HEART RATE: 64 BPM | SYSTOLIC BLOOD PRESSURE: 122 MMHG

## 2024-02-02 DIAGNOSIS — Z95.0 CARDIAC PACEMAKER IN SITU: ICD-10-CM

## 2024-02-02 DIAGNOSIS — I25.10 CAD IN NATIVE ARTERY: Primary | ICD-10-CM

## 2024-02-02 DIAGNOSIS — I10 PRIMARY HYPERTENSION: ICD-10-CM

## 2024-02-02 PROCEDURE — 3074F SYST BP LT 130 MM HG: CPT | Performed by: INTERNAL MEDICINE

## 2024-02-02 PROCEDURE — G8428 CUR MEDS NOT DOCUMENT: HCPCS | Performed by: INTERNAL MEDICINE

## 2024-02-02 PROCEDURE — 3079F DIAST BP 80-89 MM HG: CPT | Performed by: INTERNAL MEDICINE

## 2024-02-02 PROCEDURE — 99214 OFFICE O/P EST MOD 30 MIN: CPT | Performed by: INTERNAL MEDICINE

## 2024-02-02 PROCEDURE — 3017F COLORECTAL CA SCREEN DOC REV: CPT | Performed by: INTERNAL MEDICINE

## 2024-02-02 PROCEDURE — 1123F ACP DISCUSS/DSCN MKR DOCD: CPT | Performed by: INTERNAL MEDICINE

## 2024-02-02 PROCEDURE — G8417 CALC BMI ABV UP PARAM F/U: HCPCS | Performed by: INTERNAL MEDICINE

## 2024-02-02 PROCEDURE — G8484 FLU IMMUNIZE NO ADMIN: HCPCS | Performed by: INTERNAL MEDICINE

## 2024-02-02 PROCEDURE — 1036F TOBACCO NON-USER: CPT | Performed by: INTERNAL MEDICINE

## 2024-02-02 RX ORDER — ALLOPURINOL 300 MG/1
300 TABLET ORAL DAILY
COMMUNITY
Start: 2024-01-17

## 2024-02-02 RX ORDER — NITROGLYCERIN 0.4 MG/1
0.4 TABLET SUBLINGUAL EVERY 5 MIN PRN
Qty: 25 TABLET | Refills: 3 | Status: SHIPPED | OUTPATIENT
Start: 2024-02-02

## 2024-02-02 NOTE — PROGRESS NOTES
[address], [address]  [place] [address]  PHONE: [phone]    Bernabe Cole  1955      SUBJECTIVE:   Bernabe Cole is a 68 y.o. male seen for a follow up visit regarding the following:     Chief Complaint   Patient presents with    Coronary Artery Disease    Results     Nuke results    Hyperlipidemia       HPI:    Patient presents for follow-up.  Apparently he was seen by Dr. Gracia on January 2.  His notes were reviewed.  He was seen for an episode of chest discomfort.  For further evaluation a stress test was ordered.  Stress test showed no evidence of ischemia.  EF was normal at 55%.  There was noted to have increased radiotracer uptake in the left axillary lymph node.  Had CT of chest 11/22/23 without pathology.  Plans for repeat CT in 6 months for sarcoid evaluation.      No recurrent chest pain.          Past Medical History, Past Surgical History, Family history, Social History, and Medications were all reviewed with the patient today and updated as necessary.           Current Outpatient Medications:     allopurinol (ZYLOPRIM) 300 MG tablet, Take 1 tablet by mouth daily, Disp: , Rfl:     nitroGLYCERIN (NITROSTAT) 0.4 MG SL tablet, Place 1 tablet under the tongue every 5 minutes as needed for Chest pain, Disp: 25 tablet, Rfl: 3    Krill Oil 300 MG CAPS, Take by mouth, Disp: , Rfl:     PRALUENT 150 MG/ML SOAJ, INJECT 150MG SUBCUTANEOUSLY EVERY 2 WEEKS, Disp: 6 mL, Rfl: 3    fluticasone-salmeterol (ADVAIR DISKUS) 250-50 MCG/ACT AEPB diskus inhaler, Inhale 1 puff into the lungs in the morning and 1 puff in the evening., Disp: 3 each, Rfl: 3    valsartan-hydroCHLOROthiazide (DIOVAN HCT) 160-12.5 MG per tablet, Take 1 tablet by mouth daily, Disp: 30 tablet, Rfl: 3    albuterol sulfate HFA (PROVENTIL;VENTOLIN;PROAIR) 108 (90 Base) MCG/ACT inhaler, , Disp: , Rfl:     colchicine (COLCRYS) 0.6 MG tablet, Take 1 tablet by mouth as needed, Disp: , Rfl:

## 2024-02-21 PROCEDURE — 93294 REM INTERROG EVL PM/LDLS PM: CPT | Performed by: INTERNAL MEDICINE

## 2024-02-21 PROCEDURE — 93296 REM INTERROG EVL PM/IDS: CPT | Performed by: INTERNAL MEDICINE

## 2024-03-25 RX ORDER — ALIROCUMAB 150 MG/ML
INJECTION, SOLUTION SUBCUTANEOUS
Qty: 6 ML | Refills: 3 | Status: SHIPPED | OUTPATIENT
Start: 2024-03-25

## 2024-03-25 NOTE — TELEPHONE ENCOUNTER
Prescription sent to pharmacy//ofelia  Requested Prescriptions     Signed Prescriptions Disp Refills    PRALUENT 150 MG/ML SOAJ 6 mL 3     Sig: INJECT 150MG SUBCUTANEOUSLY  EVERY 2 WEEKS     Authorizing Provider: RM FINN     Ordering User: PRASHANTH BARKLEY

## 2024-05-29 ENCOUNTER — TELEPHONE (OUTPATIENT)
Dept: PULMONOLOGY | Age: 69
End: 2024-05-29

## 2024-05-29 NOTE — TELEPHONE ENCOUNTER
Patient says that he normally has a CT scan done . Asking he will need one before this upcoming appt . Ask for a call

## 2024-07-26 ENCOUNTER — NURSE ONLY (OUTPATIENT)
Age: 69
End: 2024-07-26

## 2024-07-26 DIAGNOSIS — I44.2 CHB (COMPLETE HEART BLOCK) (HCC): Primary | ICD-10-CM

## 2024-07-30 NOTE — PROGRESS NOTES
Name:  Bernabe Cole  YOB: 1955   MRN: [id number]      Office Visit: 7/31/2024        ASSESSMENT AND PLAN:  (Medical Decision Making)    Impression: 68 y.o. male has a biopsy-proven sarcoidosis, was treated with a relatively short course of steroids with improvement in his CT findings.  He seems to have a version of sarcoidosis which is CD8 predominant and is a described in the literature.  His work-up for hypersensitivity pneumonitis was negative.     CT scan has remained stable, lung function analysis is still normal, the patient is asymptomatic and at this point seems to have a burned-out phenomenon with inactive sarcoidosis.  As planned on previous visit at this point we will see the patient in 6 months and base our further actions on his symptoms.  If he remains asymptomatic then we will see him in another 6 months with repeat pulmonary function testing and if at any point in time there is worsening in pulmonary function, onset of symptoms, we will repeat imaging as well and consider retreatment with steroids for his sarcoidosis.     I recommended that the patient get vaccinated for flu which she has not done yet, RSV and COVID.        Return to the office for follow-up in 6 months time with a plan for repeat follow-up in another 6 months and if stable on pulmonary function testing at that time i.e. a year from now then we will see the patient on an annual basis with complete pulmonary function testing.     The patient is urged to continue with increasing physical activity to mitigate deconditioning.    1. Sarcoidosis  Continues to be in active, no weight loss, night sweats, uveitis, erythremia nodosum, shortness of breath more than the usual although he had an episode yesterday as he tells me when he was trying to fix the dark in his property and got quite winded however note has to be made of the extreme heat yesterday and humidity which could have contributed.  At this point we will

## 2024-07-31 ENCOUNTER — OFFICE VISIT (OUTPATIENT)
Dept: PULMONOLOGY | Age: 69
End: 2024-07-31
Payer: MEDICARE

## 2024-07-31 VITALS
TEMPERATURE: 97.4 F | HEART RATE: 77 BPM | BODY MASS INDEX: 30.62 KG/M2 | HEIGHT: 68 IN | WEIGHT: 202 LBS | OXYGEN SATURATION: 96 % | RESPIRATION RATE: 16 BRPM | DIASTOLIC BLOOD PRESSURE: 72 MMHG | SYSTOLIC BLOOD PRESSURE: 116 MMHG

## 2024-07-31 DIAGNOSIS — J45.40 MODERATE PERSISTENT ASTHMA WITHOUT COMPLICATION: ICD-10-CM

## 2024-07-31 DIAGNOSIS — R06.09 DYSPNEA ON EXERTION: ICD-10-CM

## 2024-07-31 DIAGNOSIS — G47.33 OSA (OBSTRUCTIVE SLEEP APNEA): ICD-10-CM

## 2024-07-31 DIAGNOSIS — D86.9 SARCOIDOSIS: Primary | ICD-10-CM

## 2024-07-31 PROCEDURE — 3078F DIAST BP <80 MM HG: CPT | Performed by: INTERNAL MEDICINE

## 2024-07-31 PROCEDURE — 3017F COLORECTAL CA SCREEN DOC REV: CPT | Performed by: INTERNAL MEDICINE

## 2024-07-31 PROCEDURE — 1036F TOBACCO NON-USER: CPT | Performed by: INTERNAL MEDICINE

## 2024-07-31 PROCEDURE — G8417 CALC BMI ABV UP PARAM F/U: HCPCS | Performed by: INTERNAL MEDICINE

## 2024-07-31 PROCEDURE — 99214 OFFICE O/P EST MOD 30 MIN: CPT | Performed by: INTERNAL MEDICINE

## 2024-07-31 PROCEDURE — 1123F ACP DISCUSS/DSCN MKR DOCD: CPT | Performed by: INTERNAL MEDICINE

## 2024-07-31 PROCEDURE — 3074F SYST BP LT 130 MM HG: CPT | Performed by: INTERNAL MEDICINE

## 2024-07-31 PROCEDURE — G8427 DOCREV CUR MEDS BY ELIG CLIN: HCPCS | Performed by: INTERNAL MEDICINE

## 2024-08-14 ENCOUNTER — OFFICE VISIT (OUTPATIENT)
Age: 69
End: 2024-08-14
Payer: MEDICARE

## 2024-08-14 ENCOUNTER — TRANSCRIBE ORDERS (OUTPATIENT)
Dept: SCHEDULING | Age: 69
End: 2024-08-14

## 2024-08-14 VITALS
WEIGHT: 201 LBS | BODY MASS INDEX: 30.56 KG/M2 | HEART RATE: 72 BPM | DIASTOLIC BLOOD PRESSURE: 82 MMHG | SYSTOLIC BLOOD PRESSURE: 134 MMHG

## 2024-08-14 DIAGNOSIS — E78.2 MIXED HYPERLIPIDEMIA: ICD-10-CM

## 2024-08-14 DIAGNOSIS — I25.10 CORONARY ARTERY DISEASE INVOLVING NATIVE CORONARY ARTERY OF NATIVE HEART WITHOUT ANGINA PECTORIS: Primary | ICD-10-CM

## 2024-08-14 DIAGNOSIS — D86.9 SARCOIDOSIS: ICD-10-CM

## 2024-08-14 DIAGNOSIS — Z95.0 CARDIAC PACEMAKER IN SITU: ICD-10-CM

## 2024-08-14 DIAGNOSIS — I10 PRIMARY HYPERTENSION: ICD-10-CM

## 2024-08-14 DIAGNOSIS — R27.0 ATAXIA: Primary | ICD-10-CM

## 2024-08-14 DIAGNOSIS — Z82.0 FAMILY HISTORY OF PARKINSON'S DISEASE: ICD-10-CM

## 2024-08-14 PROCEDURE — 3079F DIAST BP 80-89 MM HG: CPT | Performed by: INTERNAL MEDICINE

## 2024-08-14 PROCEDURE — G8417 CALC BMI ABV UP PARAM F/U: HCPCS | Performed by: INTERNAL MEDICINE

## 2024-08-14 PROCEDURE — 3017F COLORECTAL CA SCREEN DOC REV: CPT | Performed by: INTERNAL MEDICINE

## 2024-08-14 PROCEDURE — 3075F SYST BP GE 130 - 139MM HG: CPT | Performed by: INTERNAL MEDICINE

## 2024-08-14 PROCEDURE — G8428 CUR MEDS NOT DOCUMENT: HCPCS | Performed by: INTERNAL MEDICINE

## 2024-08-14 PROCEDURE — 1036F TOBACCO NON-USER: CPT | Performed by: INTERNAL MEDICINE

## 2024-08-14 PROCEDURE — 99214 OFFICE O/P EST MOD 30 MIN: CPT | Performed by: INTERNAL MEDICINE

## 2024-08-14 PROCEDURE — 1123F ACP DISCUSS/DSCN MKR DOCD: CPT | Performed by: INTERNAL MEDICINE

## 2024-08-14 ASSESSMENT — ENCOUNTER SYMPTOMS: SHORTNESS OF BREATH: 0

## 2024-08-14 NOTE — PROGRESS NOTES
becomes elevated, patient is encouraged to contact my office for further titration of therapy.        3. Cardiac pacemaker in situ  Normal function.  Continue to follow in device clinic.    4. Mixed hyperlipidemia  Lipids controlled.  Continue Praluent.    5. Sarcoidosis  Continue to follow with pulmonary.             Return in about 6 months (around 2/14/2025).       Geoffrey Gan MD  8/14/2024  9:18 AM    *Portions of the record have been created with voice recognition software. Occasional wrong-word or 'sound-a-like' substitutions may have occurred due to the inherent limitations of voice recognition software. Read the chart carefully and recognize, using context, where substitutions have occurred.

## 2024-08-21 ENCOUNTER — HOSPITAL ENCOUNTER (OUTPATIENT)
Dept: NUCLEAR MEDICINE | Age: 69
Discharge: HOME | End: 2024-08-24
Payer: MEDICARE

## 2024-08-21 DIAGNOSIS — Z82.0 FAMILY HISTORY OF PARKINSON'S DISEASE: ICD-10-CM

## 2024-08-21 DIAGNOSIS — R27.0 ATAXIA: ICD-10-CM

## 2024-08-21 PROCEDURE — 3430000000 HC RX DIAGNOSTIC RADIOPHARMACEUTICAL: Performed by: INTERNAL MEDICINE

## 2024-08-21 PROCEDURE — A9584 IODINE I-123 IOFLUPANE: HCPCS | Performed by: INTERNAL MEDICINE

## 2024-08-21 PROCEDURE — 78803 RP LOCLZJ TUM SPECT 1 AREA: CPT

## 2024-08-21 RX ADMIN — IOFLUPANE I-123 4.91 MILLICURIE: 2 INJECTION, SOLUTION INTRAVENOUS at 10:10

## 2024-09-17 PROCEDURE — 93294 REM INTERROG EVL PM/LDLS PM: CPT | Performed by: INTERNAL MEDICINE

## 2024-09-17 PROCEDURE — 93296 REM INTERROG EVL PM/IDS: CPT | Performed by: INTERNAL MEDICINE

## 2024-10-02 DIAGNOSIS — I10 PRIMARY HYPERTENSION: ICD-10-CM

## 2024-10-02 RX ORDER — VALSARTAN AND HYDROCHLOROTHIAZIDE 160; 12.5 MG/1; MG/1
1 TABLET, FILM COATED ORAL DAILY
Qty: 90 TABLET | Refills: 3 | Status: SHIPPED | OUTPATIENT
Start: 2024-10-02

## 2024-10-02 NOTE — TELEPHONE ENCOUNTER
MEDICATION REFILL REQUEST      Name of Medication:  Valsartan-HXTZ  Dose:  160 -12.5 mg  Frequency:    Quantity:    Days' supply:  90      Pharmacy Name/Location:  [facility]

## 2024-10-02 NOTE — TELEPHONE ENCOUNTER
Requested Prescriptions     Pending Prescriptions Disp Refills    valsartan-hydroCHLOROthiazide (DIOVAN HCT) 160-12.5 MG per tablet 90 tablet 3     Sig: Take 1 tablet by mouth daily

## 2025-01-13 RX ORDER — ALIROCUMAB 150 MG/ML
150 INJECTION, SOLUTION SUBCUTANEOUS
Qty: 6 ML | Refills: 3 | Status: SHIPPED | OUTPATIENT
Start: 2025-01-13

## 2025-01-13 NOTE — PROGRESS NOTES
Name:  Bernabe Cole  YOB: 1955   MRN: [id number]      Office Visit: 1/13/2025        ASSESSMENT AND PLAN:  (Medical Decision Making)    Impression: 69 y.o. male has a biopsy-proven sarcoidosis, was treated with a relatively short course of steroids with improvement in his CT findings.  He seems to have a version of sarcoidosis which is CD8 predominant and is a described in the literature.  His work-up for hypersensitivity pneumonitis was negative.     CT scan has remained stable, lung function analysis is still normal, the patient is asymptomatic and at this point seems to have a burned-out phenomenon with inactive sarcoidosis.  As planned on previous visit at this point we will see the patient in 6 months and base our further actions on his symptoms.  If he remains asymptomatic then we will see him in another 6 months with repeat pulmonary function testing and if at any point in time there is worsening in pulmonary function, onset of symptoms, we will repeat imaging as well and consider retreatment with steroids for his sarcoidosis.     I recommended that the patient get vaccinated for flu which she has not done yet, RSV and COVID.        Return to the office for follow-up in 6 months time with a plan for repeat follow-up in another 6 months and if stable on pulmonary function testing at that time i.e. a year from now then we will see the patient on an annual basis with complete pulmonary function testing.     The patient is urged to continue with increasing physical activity to mitigate deconditioning.    1. Sarcoidosis  Continues to be in active, no weight loss, night sweats, uveitis, erythremia nodosum, shortness of breath more than the usual although he had an episode yesterday as he tells me when he was trying to fix the dark in his property and got quite winded however note has to be made of the extreme heat yesterday and humidity which could have contributed.  At this point we will

## 2025-01-13 NOTE — TELEPHONE ENCOUNTER
Prescription sent to [facility]  Requested Prescriptions     Signed Prescriptions Disp Refills    alirocumab (PRALUENT) 150 MG/ML SOAJ 6 mL 3     Sig: Inject 150 mg as directed every 14 days     Authorizing Provider: RM FINN     Ordering User: PRASHANTH BARKLEY

## 2025-01-14 ENCOUNTER — TELEPHONE (OUTPATIENT)
Age: 70
End: 2025-01-14

## 2025-01-14 ENCOUNTER — OFFICE VISIT (OUTPATIENT)
Dept: PULMONOLOGY | Age: 70
End: 2025-01-14
Payer: MEDICARE

## 2025-01-14 VITALS
WEIGHT: 209 LBS | TEMPERATURE: 98 F | DIASTOLIC BLOOD PRESSURE: 80 MMHG | SYSTOLIC BLOOD PRESSURE: 140 MMHG | OXYGEN SATURATION: 97 % | HEART RATE: 71 BPM | BODY MASS INDEX: 31.67 KG/M2 | HEIGHT: 68 IN | RESPIRATION RATE: 20 BRPM

## 2025-01-14 DIAGNOSIS — D86.9 SARCOIDOSIS: Primary | ICD-10-CM

## 2025-01-14 DIAGNOSIS — G47.33 OSA (OBSTRUCTIVE SLEEP APNEA): ICD-10-CM

## 2025-01-14 DIAGNOSIS — R93.89 ABNORMAL CT OF THE CHEST: ICD-10-CM

## 2025-01-14 DIAGNOSIS — R06.09 DYSPNEA ON EXERTION: ICD-10-CM

## 2025-01-14 PROCEDURE — 1126F AMNT PAIN NOTED NONE PRSNT: CPT | Performed by: INTERNAL MEDICINE

## 2025-01-14 PROCEDURE — G8417 CALC BMI ABV UP PARAM F/U: HCPCS | Performed by: INTERNAL MEDICINE

## 2025-01-14 PROCEDURE — 1036F TOBACCO NON-USER: CPT | Performed by: INTERNAL MEDICINE

## 2025-01-14 PROCEDURE — 99214 OFFICE O/P EST MOD 30 MIN: CPT | Performed by: INTERNAL MEDICINE

## 2025-01-14 PROCEDURE — 3017F COLORECTAL CA SCREEN DOC REV: CPT | Performed by: INTERNAL MEDICINE

## 2025-01-14 PROCEDURE — 3077F SYST BP >= 140 MM HG: CPT | Performed by: INTERNAL MEDICINE

## 2025-01-14 PROCEDURE — G8427 DOCREV CUR MEDS BY ELIG CLIN: HCPCS | Performed by: INTERNAL MEDICINE

## 2025-01-14 PROCEDURE — 1123F ACP DISCUSS/DSCN MKR DOCD: CPT | Performed by: INTERNAL MEDICINE

## 2025-01-14 PROCEDURE — 3079F DIAST BP 80-89 MM HG: CPT | Performed by: INTERNAL MEDICINE

## 2025-01-14 PROCEDURE — 1159F MED LIST DOCD IN RCRD: CPT | Performed by: INTERNAL MEDICINE

## 2025-01-14 RX ORDER — MONTELUKAST SODIUM 10 MG/1
10 TABLET ORAL DAILY
Qty: 30 TABLET | Refills: 3 | Status: SHIPPED | OUTPATIENT
Start: 2025-01-14

## 2025-01-14 RX ORDER — FLUTICASONE PROPIONATE AND SALMETEROL 250; 50 UG/1; UG/1
1 POWDER RESPIRATORY (INHALATION) EVERY 12 HOURS
Qty: 3 EACH | Refills: 3 | Status: SHIPPED | OUTPATIENT
Start: 2025-01-14

## 2025-01-14 NOTE — PROGRESS NOTES
Name:  Bernabe Cole  YOB: 1955   MRN: [id number]      Office Visit: 1/14/2025        ASSESSMENT AND PLAN:  (Medical Decision Making)    Impression: 69 y.o. male has a biopsy-proven sarcoidosis, was treated with a relatively short course of steroids with improvement in his CT findings.  He seems to have a version of sarcoidosis which is CD8 predominant and is a described in the literature.  His work-up for hypersensitivity pneumonitis was negative.     CT scan has remained stable, lung function analysis is still normal, the patient is asymptomatic and at this point seems to have a burned-out phenomenon with inactive sarcoidosis.  As planned on previous visit at this point we will see the patient in 6 months and base our further actions on his symptoms.  If he remains asymptomatic then we will see him in another 6 months with repeat pulmonary function testing and if at any point in time there is worsening in pulmonary function, onset of symptoms, we will repeat imaging as well and consider retreatment with steroids for his sarcoidosis.     I recommended that the patient get vaccinated for flu which she has not done yet, RSV and COVID.        Return to the office for follow-up in 6 months time with a plan for repeat follow-up in another 6 months and if stable on pulmonary function testing at that time i.e. a year from now then we will see the patient on an annual basis with complete pulmonary function testing.     The patient is urged to continue with increasing physical activity to mitigate deconditioning.    1. Sarcoidosis  Continues to be inactive, no weight loss, night sweats, uveitis, erythremia nodosum, shortness of breath more than the usual.  At this point we will continue observational strategy, the patient will return to the office for follow-up in 6 months he will watch for onset of symptoms, he was instructed to make an appointment with ophthalmology and have a yearly checkup for

## 2025-01-15 NOTE — TELEPHONE ENCOUNTER
Received request for further notes for Praulent. Form filled out and faxed [phone] with office note//brendab

## 2025-01-16 NOTE — TELEPHONE ENCOUNTER
Request Reference Number: [id number]. PRALUENT INJ 150MG/ML is approved through 12/31/2025. Your patient may now fill this prescription and it will be covered. Patient called with approval//[email or web address]

## 2025-02-06 ENCOUNTER — TELEPHONE (OUTPATIENT)
Age: 70
End: 2025-02-06

## 2025-02-06 NOTE — TELEPHONE ENCOUNTER
Patient states his insurance is not covering Praluent , but they will cover Repatha.  Can a new prescription be sent in for Repatha.  Mail order [facility].

## 2025-02-06 NOTE — TELEPHONE ENCOUNTER
Patient called stating that insurance has changed and will not pay for Praluent, it is over $600.00. They will pay for Repatha, would like a prescription sent to [facility]  Requested Prescriptions     Signed Prescriptions Disp Refills    Evolocumab 140 MG/ML SOAJ 6 Adjustable Dose Pre-filled Pen Syringe 3     Sig: Inject 140 mg into the skin every 14 days     Authorizing Provider: RM FINN     Ordering User: PRASHANTH BARKLEY

## 2025-02-19 PROBLEM — R07.89 ATYPICAL CHEST PAIN: Status: RESOLVED | Noted: 2024-01-02 | Resolved: 2025-02-19

## 2025-02-20 ENCOUNTER — OFFICE VISIT (OUTPATIENT)
Age: 70
End: 2025-02-20
Payer: MEDICARE

## 2025-02-20 VITALS
HEIGHT: 68 IN | HEART RATE: 64 BPM | DIASTOLIC BLOOD PRESSURE: 80 MMHG | SYSTOLIC BLOOD PRESSURE: 138 MMHG | WEIGHT: 207 LBS | BODY MASS INDEX: 31.37 KG/M2

## 2025-02-20 DIAGNOSIS — I10 PRIMARY HYPERTENSION: Primary | ICD-10-CM

## 2025-02-20 DIAGNOSIS — E78.2 MIXED HYPERLIPIDEMIA: ICD-10-CM

## 2025-02-20 DIAGNOSIS — Z95.0 CARDIAC PACEMAKER IN SITU: ICD-10-CM

## 2025-02-20 DIAGNOSIS — I25.10 CORONARY ARTERY DISEASE INVOLVING NATIVE CORONARY ARTERY OF NATIVE HEART WITHOUT ANGINA PECTORIS: ICD-10-CM

## 2025-02-20 PROCEDURE — 99214 OFFICE O/P EST MOD 30 MIN: CPT | Performed by: INTERNAL MEDICINE

## 2025-02-20 PROCEDURE — 1123F ACP DISCUSS/DSCN MKR DOCD: CPT | Performed by: INTERNAL MEDICINE

## 2025-02-20 PROCEDURE — G8428 CUR MEDS NOT DOCUMENT: HCPCS | Performed by: INTERNAL MEDICINE

## 2025-02-20 PROCEDURE — G8417 CALC BMI ABV UP PARAM F/U: HCPCS | Performed by: INTERNAL MEDICINE

## 2025-02-20 PROCEDURE — 3079F DIAST BP 80-89 MM HG: CPT | Performed by: INTERNAL MEDICINE

## 2025-02-20 PROCEDURE — 1036F TOBACCO NON-USER: CPT | Performed by: INTERNAL MEDICINE

## 2025-02-20 PROCEDURE — 3075F SYST BP GE 130 - 139MM HG: CPT | Performed by: INTERNAL MEDICINE

## 2025-02-20 PROCEDURE — 3017F COLORECTAL CA SCREEN DOC REV: CPT | Performed by: INTERNAL MEDICINE

## 2025-02-20 PROCEDURE — 1126F AMNT PAIN NOTED NONE PRSNT: CPT | Performed by: INTERNAL MEDICINE

## 2025-02-20 PROCEDURE — 93000 ELECTROCARDIOGRAM COMPLETE: CPT | Performed by: INTERNAL MEDICINE

## 2025-02-20 ASSESSMENT — ENCOUNTER SYMPTOMS: SHORTNESS OF BREATH: 0

## 2025-02-20 NOTE — PROGRESS NOTES
[address], [place] [address]  PHONE: [phone]    Bernabe Cole  1955      SUBJECTIVE:   Bernabe Cole is a 69 y.o. male seen for a follow up visit regarding the following:     Chief Complaint   Patient presents with    Coronary Artery Disease       HPI:    Bernabe Cole presents for routine follow. Multiple issues addressed as outlined below:     Coronary Artery Disease  Status:  Patient denies any recent angina.  Notes compliance with medical therapy.  No recent NTG use.    Medications:  ASA 81 mg QD  Prior History:      Unstable angina (3/29/13):  PCI of distal RCA:   4 X 23 mm Xience MASON.  2.   LHC (9/13/22):  Patent RCA stent.  Mild disease in other vessels.  EF 55-60%.   3.  Echo (11/21/2023): EF 60%.  Normal diastolic function.  Mild MR/TR    Pacemaker  Status:  Device interrogated December 16.  98% RV pacing 17% atrially pacing.  No atrial fibrillation.  Prior History:     Dual chamber pacemaker (BSC) placed due to heart block and syncope (5/2/22)      Hypertension  Status:  Ambulatory BP readings have been controlled.  Patient reports compliance with medical therapy without side effects.    Medications: Diovan//12.5 mg QD  Labs:  Followed with Dr. Walter.    Hyperlipidemia  Status:  Cholesterol panel  followed by PCP and patient reports lipids under acceptable control.  Tolerating current statin dose without side effects. .  Medications: Repatha 140 mg SC q 12.     Patient remains interested in potentially being placed on a weight loss drug.  States his wife started Wegovy through his primary care physician.      EKG done today and reviewed with patient showed:  Sinus Rhythm -First degree A-V block   Ventricular Pacing.   Rate 64       PERTINENT PRIOR HISTORY:  Obstructive sleep apnea  Sarcoidosis:  Followed by pulmonary.  Dr. Oneal.       Past Medical History, Past Surgical History, Family history, Social History, and

## 2025-04-28 ENCOUNTER — TELEPHONE (OUTPATIENT)
Dept: PULMONOLOGY | Age: 70
End: 2025-04-28

## 2025-07-30 NOTE — PROGRESS NOTES
Weight: 92.5 kg (204 lb)   Height: 1.727 m (5' 8\")           General:   Alert, cooperative, no distress, appears stated age.        Eyes:   Conjunctivae/corneas clear. PERRL        Mouth/Throat:  Lips, mucosa, and tongue normal. Teeth and gums normal.        Lungs:   Clear to auscultation bilaterally with no rhonchi wheezing or crackles     Heart:   Regular rate and rhythm, S1, S2 normal, no murmur, click, rub or gallop.     Abdomen:    Soft, non-tender.     Extremities:  Extremities normal, atraumatic, no cyanosis or edema.     Skin:  Skin color normal. No rashes or lesions     Neurologic:  A&Ox3     DIAGNOSTIC TESTS:                                                                                    LABS:   Lab Results   Component Value Date/Time    WBC 5.3 09/08/2022 09:33 AM    HGB 15.1 09/08/2022 09:33 AM    HCT 44.3 09/08/2022 09:33 AM     09/08/2022 09:33 AM    TSH 0.957 05/01/2022 06:14 PM    CRP 0.4 06/30/2023 09:41 AM     BAL and LABs 7/26  Negative for  Blasto  Histoplasmosis  HIV.  Respiratory culture.  AFB culture.  Atypical pneumonia.  Aspergillus.  Varicella.  Viral culture.  Fungal culture.  Cytology-negative for cancer cells.  Lymphocyte ratio CD4 to CD8 was 0.78.  Differential was neutrophils 96% lymphocytes 6%    Date Obtained:   10/27/2022   DIAGNOSIS        \"TBBX RUL\":             FRAGMENTS OF BENIGN BRONCHOPULMONARY TISSUE WITH NONCASEATING                  GRANULOMATOUS INFLAMMATION.             SPECIAL STAINS FOR ACID-FAST BACILLI AND FUNGAL ORGANISMS ARE        NEGATIVE.   [facility]/10/31/2022         Sign Out Date: 10/31/2022  Gerardo Gutierrez MD     Microscopic Description        Microscopic examination has been performed and the results are   incorporated in the above diagnosis.     Specimen(s) Received   A: TB Bx RUL   Clinical History   Macroscopic Description   Bernabe Cole, received in formalin labeled TBBX RUL and specimen   consists of multiple pale tan tissue fragments measuring

## 2025-07-31 ENCOUNTER — CLINICAL SUPPORT (OUTPATIENT)
Dept: PULMONOLOGY | Age: 70
End: 2025-07-31
Payer: MEDICARE

## 2025-07-31 ENCOUNTER — OFFICE VISIT (OUTPATIENT)
Dept: PULMONOLOGY | Age: 70
End: 2025-07-31
Payer: MEDICARE

## 2025-07-31 VITALS
WEIGHT: 204 LBS | DIASTOLIC BLOOD PRESSURE: 82 MMHG | HEIGHT: 68 IN | SYSTOLIC BLOOD PRESSURE: 139 MMHG | HEART RATE: 65 BPM | TEMPERATURE: 97.9 F | OXYGEN SATURATION: 99 % | BODY MASS INDEX: 30.92 KG/M2 | RESPIRATION RATE: 17 BRPM

## 2025-07-31 DIAGNOSIS — D86.9 SARCOIDOSIS: Primary | ICD-10-CM

## 2025-07-31 DIAGNOSIS — R93.89 ABNORMAL CT OF THE CHEST: ICD-10-CM

## 2025-07-31 DIAGNOSIS — D86.9 SARCOIDOSIS: ICD-10-CM

## 2025-07-31 DIAGNOSIS — G47.33 OSA (OBSTRUCTIVE SLEEP APNEA): Primary | ICD-10-CM

## 2025-07-31 LAB
FEF25-27, POC: 3.46 L/S
FET, POC: NORMAL
FEV 1 , POC: 3.22 L
FEV1/FVC, POC: NORMAL
FVC, POC: NORMAL
LUNG AGE, POC: NORMAL
PEF, POC: 7.7 L/S

## 2025-07-31 PROCEDURE — 94729 DIFFUSING CAPACITY: CPT | Performed by: INTERNAL MEDICINE

## 2025-07-31 PROCEDURE — 99214 OFFICE O/P EST MOD 30 MIN: CPT | Performed by: INTERNAL MEDICINE

## 2025-07-31 PROCEDURE — G8417 CALC BMI ABV UP PARAM F/U: HCPCS | Performed by: INTERNAL MEDICINE

## 2025-07-31 PROCEDURE — 3079F DIAST BP 80-89 MM HG: CPT | Performed by: INTERNAL MEDICINE

## 2025-07-31 PROCEDURE — 94726 PLETHYSMOGRAPHY LUNG VOLUMES: CPT | Performed by: INTERNAL MEDICINE

## 2025-07-31 PROCEDURE — G2211 COMPLEX E/M VISIT ADD ON: HCPCS | Performed by: INTERNAL MEDICINE

## 2025-07-31 PROCEDURE — 1036F TOBACCO NON-USER: CPT | Performed by: INTERNAL MEDICINE

## 2025-07-31 PROCEDURE — 94010 BREATHING CAPACITY TEST: CPT | Performed by: INTERNAL MEDICINE

## 2025-07-31 PROCEDURE — 3075F SYST BP GE 130 - 139MM HG: CPT | Performed by: INTERNAL MEDICINE

## 2025-07-31 PROCEDURE — 1126F AMNT PAIN NOTED NONE PRSNT: CPT | Performed by: INTERNAL MEDICINE

## 2025-07-31 PROCEDURE — 3017F COLORECTAL CA SCREEN DOC REV: CPT | Performed by: INTERNAL MEDICINE

## 2025-07-31 PROCEDURE — 1159F MED LIST DOCD IN RCRD: CPT | Performed by: INTERNAL MEDICINE

## 2025-07-31 PROCEDURE — G8427 DOCREV CUR MEDS BY ELIG CLIN: HCPCS | Performed by: INTERNAL MEDICINE

## 2025-07-31 PROCEDURE — 1123F ACP DISCUSS/DSCN MKR DOCD: CPT | Performed by: INTERNAL MEDICINE

## 2025-07-31 RX ORDER — MONTELUKAST SODIUM 10 MG/1
10 TABLET ORAL DAILY
Qty: 30 TABLET | Refills: 11 | Status: SHIPPED | OUTPATIENT
Start: 2025-07-31

## 2025-07-31 RX ORDER — ALBUTEROL SULFATE 90 UG/1
2 INHALANT RESPIRATORY (INHALATION) EVERY 6 HOURS PRN
Qty: 18 G | Refills: 11 | Status: SHIPPED | OUTPATIENT
Start: 2025-07-31

## 2025-07-31 RX ORDER — FLUTICASONE PROPIONATE AND SALMETEROL 250; 50 UG/1; UG/1
1 POWDER RESPIRATORY (INHALATION) EVERY 12 HOURS
Qty: 3 EACH | Refills: 3 | Status: SHIPPED | OUTPATIENT
Start: 2025-07-31

## 2025-08-05 PROCEDURE — 93294 REM INTERROG EVL PM/LDLS PM: CPT | Performed by: INTERNAL MEDICINE

## 2025-08-05 PROCEDURE — 93296 REM INTERROG EVL PM/IDS: CPT | Performed by: INTERNAL MEDICINE

## 2025-08-21 ENCOUNTER — OFFICE VISIT (OUTPATIENT)
Age: 70
End: 2025-08-21
Payer: MEDICARE

## 2025-08-21 VITALS
HEIGHT: 68 IN | WEIGHT: 200.6 LBS | SYSTOLIC BLOOD PRESSURE: 120 MMHG | BODY MASS INDEX: 30.4 KG/M2 | DIASTOLIC BLOOD PRESSURE: 78 MMHG | HEART RATE: 76 BPM

## 2025-08-21 DIAGNOSIS — E78.2 MIXED HYPERLIPIDEMIA: ICD-10-CM

## 2025-08-21 DIAGNOSIS — I10 PRIMARY HYPERTENSION: ICD-10-CM

## 2025-08-21 DIAGNOSIS — I25.10 CORONARY ARTERY DISEASE INVOLVING NATIVE CORONARY ARTERY OF NATIVE HEART WITHOUT ANGINA PECTORIS: Primary | ICD-10-CM

## 2025-08-21 DIAGNOSIS — Z95.0 CARDIAC PACEMAKER IN SITU: ICD-10-CM

## 2025-08-21 PROBLEM — G45.3 AMAUROSIS FUGAX OF RIGHT EYE: Status: RESOLVED | Noted: 2022-05-01 | Resolved: 2025-08-21

## 2025-08-21 PROCEDURE — 99214 OFFICE O/P EST MOD 30 MIN: CPT | Performed by: INTERNAL MEDICINE

## 2025-08-21 PROCEDURE — 3078F DIAST BP <80 MM HG: CPT | Performed by: INTERNAL MEDICINE

## 2025-08-21 PROCEDURE — G8417 CALC BMI ABV UP PARAM F/U: HCPCS | Performed by: INTERNAL MEDICINE

## 2025-08-21 PROCEDURE — 3074F SYST BP LT 130 MM HG: CPT | Performed by: INTERNAL MEDICINE

## 2025-08-21 PROCEDURE — 1123F ACP DISCUSS/DSCN MKR DOCD: CPT | Performed by: INTERNAL MEDICINE

## 2025-08-21 PROCEDURE — 3017F COLORECTAL CA SCREEN DOC REV: CPT | Performed by: INTERNAL MEDICINE

## 2025-08-21 PROCEDURE — 1036F TOBACCO NON-USER: CPT | Performed by: INTERNAL MEDICINE

## 2025-08-21 PROCEDURE — 1126F AMNT PAIN NOTED NONE PRSNT: CPT | Performed by: INTERNAL MEDICINE

## 2025-08-21 PROCEDURE — G8428 CUR MEDS NOT DOCUMENT: HCPCS | Performed by: INTERNAL MEDICINE

## 2025-08-21 RX ORDER — SEMAGLUTIDE 0.25 MG/.5ML
INJECTION, SOLUTION SUBCUTANEOUS
COMMUNITY
Start: 2025-07-22

## 2025-08-21 ASSESSMENT — ENCOUNTER SYMPTOMS: SHORTNESS OF BREATH: 0

## (undated) DEVICE — 18G NG KIT WITH 96IN PROBE COVER (10 PK): Brand: SITE-RITE

## (undated) DEVICE — SINGLE USE BIOPSY VALVE MAJ-210: Brand: SINGLE USE BIOPSY VALVE (STERILE)

## (undated) DEVICE — CONTAINER FORMALIN PREFILLED 10% NBF 60ML

## (undated) DEVICE — SUTURE V-LOC 90 3-0 L9IN ABSRB VLT L26MM V-20 1/2 CIR TAPR VLOCM0644

## (undated) DEVICE — GAUZE,SPONGE,4"X4",12PLY,WOVEN,NS,LF: Brand: MEDLINE

## (undated) DEVICE — YANKAUER,BULB TIP,W/O VENT,RIGID,STERILE: Brand: MEDLINE

## (undated) DEVICE — SYRINGE MED 10ML LUERLOCK TIP W/O SFTY DISP

## (undated) DEVICE — SUTURE PERMAHAND SZ 2-0 L30IN NONABSORBABLE BLK L17MM BB K883H

## (undated) DEVICE — FORCEPS BX L240CM JAW DIA2.8MM L CAP W/ NDL MIC MESH TOOTH

## (undated) DEVICE — CATHETER DIAG AD 5FR L110CM 145DEG COR GRY HYDRPHLC NYL

## (undated) DEVICE — CANNULA NSL ORAL AD FOR CAPNOFLEX CO2 O2 AIRLFE

## (undated) DEVICE — KENDALL RADIOLUCENT FOAM MONITORING ELECTRODE RECTANGULAR SHAPE: Brand: KENDALL

## (undated) DEVICE — GUIDE NDL ST W/ 14 X 147CM TELESCOPICALLY FLD CIV FLX CVR

## (undated) DEVICE — PLASMABLADE X PS210-030S-LIGHT 3.0SL: Brand: PLASMABLADE™ X

## (undated) DEVICE — NEEDLE SYR 18GA L1.5IN RED PLAS HUB S STL BLNT FILL W/O

## (undated) DEVICE — BLOCK BITE AD 60FR W/ VELC STRP ADDRESSES MOST PT AND

## (undated) DEVICE — LUBE JELLY FOIL PACK 1.4 OZ: Brand: MEDLINE INDUSTRIES, INC.

## (undated) DEVICE — SYRINGE MED 3ML CLR PLAS STD N CTRL LUERLOCK TIP DISP

## (undated) DEVICE — CATHETER ANGIO 5FR L100CM GRY S STL NYL JL3.5 3 SEG BRAID L

## (undated) DEVICE — CONNECTOR TBNG OD5-7MM O2 END DISP

## (undated) DEVICE — SUTURE ETHBND EXCEL SZ 0 L30IN NONABSORBABLE GRN L26MM CT-2 X412H

## (undated) DEVICE — BAND COMPR L24CM REG CLR PLAS HEMSTAT EXT HK AND LOOP RETEN

## (undated) DEVICE — BRONCHOSCOPY PACK: Brand: MEDLINE INDUSTRIES, INC.

## (undated) DEVICE — SUTURE ABSORBABLE BRAIDED 2-0 CT-1 27 IN UD VICRYL J259H

## (undated) DEVICE — MOUTHPIECE ENDOSCP L CTRL OPN AND SIDE PORTS DISP

## (undated) DEVICE — 6 FOOT DISPOSABLE EXTENSION CABLE WITH SAFE CONNECT / SCREW-DOWN

## (undated) DEVICE — SINGLE USE SUCTION VALVE MAJ-209: Brand: SINGLE USE SUCTION VALVE (STERILE)

## (undated) DEVICE — RADIFOCUS OPTITORQUE ANGIOGRAPHIC CATHETER: Brand: OPTITORQUE

## (undated) DEVICE — C-ARM: Brand: UNBRANDED

## (undated) DEVICE — SHEATH INTRO L12CM DIA6FR W/ LUERLOCK HUB HEMSTAS VLV

## (undated) DEVICE — AIRLIFE™ OXYGEN TUBING 7 FEET (2.1 M) CRUSH RESISTANT OXYGEN TUBING, VINYL TIPPED: Brand: AIRLIFE™

## (undated) DEVICE — IMMOBILIZER KNEE 3 PNL 19 IN

## (undated) DEVICE — GLIDESHEATH SLENDER STAINLESS STEEL KIT: Brand: GLIDESHEATH SLENDER

## (undated) DEVICE — Device

## (undated) DEVICE — SINGLE PORT MANIFOLD: Brand: NEPTUNE 2

## (undated) DEVICE — NEEDLE HYPO 23GA L1IN TURQ S STL HUB POLYPR SHLD REG BVL

## (undated) DEVICE — CATHETER ANGIO 5FR L100CM GRY S STL NYL JR4 3 SEG BRAID L

## (undated) DEVICE — SYRINGE, LUER SLIP, STERILE, 60ML: Brand: MEDLINE

## (undated) DEVICE — INTRO PEELWY HEMVLV 6F 13CM -- SHRT PRELUDE SNAP

## (undated) DEVICE — GUIDEWIRE 035IN 210CM PTFE COAT FIX COR J TIP 15MM FIRM BODY

## (undated) DEVICE — Z DUPLICATE USE 2275497 DRSG POSTOP PRMSL AG 3.5X6IN